# Patient Record
Sex: MALE | Race: WHITE | NOT HISPANIC OR LATINO | Employment: OTHER | URBAN - METROPOLITAN AREA
[De-identification: names, ages, dates, MRNs, and addresses within clinical notes are randomized per-mention and may not be internally consistent; named-entity substitution may affect disease eponyms.]

---

## 2017-05-03 ENCOUNTER — NON-PROVIDER VISIT (OUTPATIENT)
Dept: URGENT CARE | Facility: CLINIC | Age: 47
End: 2017-05-03

## 2017-05-03 DIAGNOSIS — Z02.1 PRE-EMPLOYMENT DRUG SCREENING: ICD-10-CM

## 2017-05-03 LAB
AMP AMPHETAMINE: NORMAL
COC COCAINE: NORMAL
INT CON NEG: NORMAL
INT CON POS: NORMAL
MET METHAMPHETAMINES: NORMAL
OPI OPIATES: NORMAL
PCP PHENCYCLIDINE: NORMAL
POC DRUG COMMENT 753798-OCCUPATIONAL HEALTH: NORMAL
THC: NORMAL

## 2017-05-03 PROCEDURE — 80305 DRUG TEST PRSMV DIR OPT OBS: CPT | Performed by: NURSE PRACTITIONER

## 2017-05-10 ENCOUNTER — HOSPITAL ENCOUNTER (EMERGENCY)
Dept: HOSPITAL 8 - ED | Age: 47
Discharge: HOME | End: 2017-05-10
Payer: SELF-PAY

## 2017-05-10 VITALS — BODY MASS INDEX: 28.95 KG/M2 | HEIGHT: 75 IN | WEIGHT: 232.81 LBS

## 2017-05-10 VITALS — DIASTOLIC BLOOD PRESSURE: 87 MMHG | SYSTOLIC BLOOD PRESSURE: 136 MMHG

## 2017-05-10 DIAGNOSIS — K92.2: Primary | ICD-10-CM

## 2017-05-10 LAB
AST SERPL-CCNC: 25 U/L (ref 15–37)
BUN SERPL-MCNC: 14 MG/DL (ref 7–18)

## 2017-05-10 PROCEDURE — 99285 EMERGENCY DEPT VISIT HI MDM: CPT

## 2017-05-10 PROCEDURE — 81003 URINALYSIS AUTO W/O SCOPE: CPT

## 2017-05-10 PROCEDURE — 83690 ASSAY OF LIPASE: CPT

## 2017-05-10 PROCEDURE — 80053 COMPREHEN METABOLIC PANEL: CPT

## 2017-05-10 PROCEDURE — 85730 THROMBOPLASTIN TIME PARTIAL: CPT

## 2017-05-10 PROCEDURE — 85610 PROTHROMBIN TIME: CPT

## 2017-05-10 PROCEDURE — 36415 COLL VENOUS BLD VENIPUNCTURE: CPT

## 2017-05-10 PROCEDURE — 74177 CT ABD & PELVIS W/CONTRAST: CPT

## 2017-05-10 PROCEDURE — 85025 COMPLETE CBC W/AUTO DIFF WBC: CPT

## 2017-05-10 PROCEDURE — 74020: CPT

## 2017-06-22 ENCOUNTER — OFFICE VISIT (OUTPATIENT)
Dept: MEDICAL GROUP | Facility: MEDICAL CENTER | Age: 47
End: 2017-06-22
Attending: NURSE PRACTITIONER
Payer: MEDICAID

## 2017-06-22 VITALS
HEIGHT: 76 IN | DIASTOLIC BLOOD PRESSURE: 72 MMHG | BODY MASS INDEX: 28.37 KG/M2 | TEMPERATURE: 97.8 F | OXYGEN SATURATION: 100 % | WEIGHT: 233 LBS | SYSTOLIC BLOOD PRESSURE: 104 MMHG | RESPIRATION RATE: 16 BRPM | HEART RATE: 72 BPM

## 2017-06-22 DIAGNOSIS — Z13.21 ENCOUNTER FOR VITAMIN DEFICIENCY SCREENING: ICD-10-CM

## 2017-06-22 DIAGNOSIS — J34.2 DEVIATED NASAL SEPTUM: ICD-10-CM

## 2017-06-22 DIAGNOSIS — Z13.1 SCREENING FOR DIABETES MELLITUS: ICD-10-CM

## 2017-06-22 DIAGNOSIS — F99 PSYCHIATRIC DISORDER: ICD-10-CM

## 2017-06-22 DIAGNOSIS — R06.83 SNORES: ICD-10-CM

## 2017-06-22 DIAGNOSIS — Z76.89 SLEEP CONCERN: ICD-10-CM

## 2017-06-22 DIAGNOSIS — Z13.29 SCREENING FOR THYROID DISORDER: ICD-10-CM

## 2017-06-22 DIAGNOSIS — Z00.00 ROUTINE HEALTH MAINTENANCE: ICD-10-CM

## 2017-06-22 DIAGNOSIS — K21.00 GASTROESOPHAGEAL REFLUX DISEASE WITH ESOPHAGITIS: ICD-10-CM

## 2017-06-22 DIAGNOSIS — Z13.220 SCREENING CHOLESTEROL LEVEL: ICD-10-CM

## 2017-06-22 DIAGNOSIS — Z87.19 HISTORY OF BARRETT'S ESOPHAGUS: ICD-10-CM

## 2017-06-22 DIAGNOSIS — Z12.5 SCREENING PSA (PROSTATE SPECIFIC ANTIGEN): ICD-10-CM

## 2017-06-22 DIAGNOSIS — E34.9 TESTOSTERONE INSUFFICIENCY: ICD-10-CM

## 2017-06-22 PROCEDURE — 99203 OFFICE O/P NEW LOW 30 MIN: CPT | Performed by: NURSE PRACTITIONER

## 2017-06-22 PROCEDURE — 99204 OFFICE O/P NEW MOD 45 MIN: CPT | Performed by: NURSE PRACTITIONER

## 2017-06-22 RX ORDER — TESTOSTERONE 16.2 MG/G
GEL TRANSDERMAL
Refills: 1 | COMMUNITY
Start: 2017-04-15 | End: 2017-08-07 | Stop reason: SDUPTHER

## 2017-06-22 RX ORDER — HYDROXYZINE PAMOATE 50 MG/1
CAPSULE ORAL
Refills: 1 | COMMUNITY
Start: 2017-04-15 | End: 2017-06-22

## 2017-06-22 RX ORDER — BUSPIRONE HYDROCHLORIDE 30 MG/1
TABLET ORAL
Refills: 0 | COMMUNITY
Start: 2017-04-24 | End: 2017-10-05

## 2017-06-22 RX ORDER — OMEPRAZOLE 20 MG/1
20 TABLET, DELAYED RELEASE ORAL DAILY
Qty: 30 TAB | Refills: 2 | Status: SHIPPED | OUTPATIENT
Start: 2017-06-22 | End: 2017-09-18 | Stop reason: SDUPTHER

## 2017-06-22 RX ORDER — TESTOSTERONE 20.25 MG/1.25G
1 GEL TOPICAL DAILY
Qty: 40 G | Refills: 2 | Status: SHIPPED | OUTPATIENT
Start: 2017-06-22 | End: 2017-10-05

## 2017-06-22 RX ORDER — LORAZEPAM 0.5 MG/1
TABLET ORAL
Refills: 0 | COMMUNITY
Start: 2017-05-30 | End: 2017-10-05

## 2017-06-22 RX ORDER — QUETIAPINE FUMARATE 100 MG/1
100 TABLET, FILM COATED ORAL
COMMUNITY
End: 2017-08-07

## 2017-06-22 RX ORDER — TRAZODONE HYDROCHLORIDE 150 MG/1
TABLET ORAL
Refills: 0 | COMMUNITY
Start: 2017-06-02 | End: 2017-08-07

## 2017-06-22 RX ORDER — SERTRALINE HYDROCHLORIDE 100 MG/1
TABLET, FILM COATED ORAL
Refills: 0 | COMMUNITY
Start: 2017-05-30 | End: 2017-08-07

## 2017-06-22 ASSESSMENT — PATIENT HEALTH QUESTIONNAIRE - PHQ9: CLINICAL INTERPRETATION OF PHQ2 SCORE: 1

## 2017-06-22 NOTE — PROGRESS NOTES
Chief Complaint   Patient presents with   • New Patient     low testosterone          HISTORY OF THE PRESENT ILLNESS: Patient is a 46 y.o. male.   aJred presents to the clinic to establish care as New Patient.    His PMH includes    Anxiety,Depression, Bipolar Disorder  Testosterone Use/Low Testosterone  GERD  Hernia Repair    Established with  Psychiatry- Isra Kate ( in [facility])    [place]  Report shows  6/15/17 Lorazepam 0.5 mg # 28 by Isra Kate (Psychiatry)  5/30/17 Lorazepam 0.5 # 14 by Isra Kate  4/15/17 Androgel 1.62 %  75 (30 days) Adán Galindo (Rachel Kline)  More similar Androgel and Lorazepam entries in report  14 Rx and 5 Prescribers    Routine health [facility] info to Pt for vision check.    Psychiatric disorder  Pt reports he has hx of anxiety, depression, and Bipolar Disorder.  He reports he is established at [facility] and sees Psychiatrist Isra Kate.  Is taking Lorazepam, Trazodone, Sertraline, Seroquel and Buspar.      Testosterone insufficiency per patient  Pt reports he has low testosterone history and has been using Androgel.  Pt is currently on Androgel 1.62 daily by pump.  Pt wanting refill.  Pt reports has been on Testosterone for about 5 yrs on and off.  Moved from [facility] 3 months ago.    We will ask for records from Eliud Ramos MD so we can see his past   Levels of Testosterone, as our test will most likely show normal levels as  Pt reports he has been using androgel pump daily but needs refill.      History of Lawrence's esophagus  Pt reports he has hx of Lawrence's esophagus and GERD.  Denies any esophageal bleeding in past.  Requesting Referral to GI MD.  IN past on Omeprazole helped his ongoing  GERD w intermittent  Esophagitis.      Gastroesophageal reflux disease with esophagitis  Pt reports has known GERD.  States has not been on meds for a while.  Reports some mild indigestion.  Hx of  Lawrence's Esophagus per patient.   In past believes was on Omeprazole and it helps.  Denied dark or bloody stools.    We discussed RX for Omeprazole and if insurance denies, will have to trial  H2 blocker or apply for Prior Auth      Deviated nasal septum  Pt reports has had deviated septum with difficulty breathing our of left side   And recurrent nose bleeds, Less in past 6 months as using emollient.  Not had CT scan of nasal area.    Snores  Pt has hx of snoring.  Reports deviated septum  Never had sleep study.  Pt reports was supposed to do sleep study while living in [place] but never completed.      Allergies: Review of patient's allergies indicates not on file.    Current Outpatient Prescriptions Ordered in [facility]   Medication Sig Dispense Refill   • ANDROGEL PUMP 20.25 MG/ACT (1.62%) Gel TAMMI 2 PUMPS AA QD UTD  1   • trazodone (DESYREL) 150 MG Tab TK 2 TS PO QHS  0   • busPIRone (BUSPAR) 30 MG tablet TK 1 T PO BID  0   • lorazepam (ATIVAN) 0.5 MG Tab TK 1 T PO  ONCE A DAY PRN  0   • sertraline (ZOLOFT) 100 MG Tab TK 1 T PO  QAM  0   • quetiapine (SEROQUEL) 100 MG Tab Take 100 mg by mouth every bedtime.     • Testosterone (ANDROGEL) 20.25 MG/1.25GM (1.62%) Gel Apply 1 Spray to skin as directed every day. 40 g 2   • omeprazole (PRILOSEC OTC) 20 MG tablet Take 1 Tab by mouth every day. 30 Tab 2     No current Epic-ordered facility-administered medications on file.       Past Medical History   Diagnosis Date   • Anxiety    • Depression    • Bipolar affective disorder ([facility])    • GERD (gastroesophageal reflux disease)        Past Surgical History   Procedure Laterality Date   • Hernia repair  1982   • Abdominal exploration         Social History   Substance Use Topics   • Smoking status: Never Smoker    • Smokeless tobacco: Never Used   • Alcohol Use: No       No family status information on file.     Family History   Problem Relation Age of Onset   • Arthritis Father    • Heart Disease Maternal Uncle    •  "Psychiatry Paternal Aunt    • Heart Disease Maternal Grandmother    • Lung Disease Maternal Grandfather    • Cancer Maternal Grandfather    • Cancer Paternal Grandfather        Review of Systems   Constitutional: Negative for fever, chills, weight loss and malaise/fatigue.   HENT: Negative for ear pain, nosebleeds, congestion, sore throat and neck pain.    Eyes: Negative for blurred vision.   Respiratory: Negative for cough, sputum production, shortness of breath and wheezing.    Cardiovascular: Negative for chest pain, palpitations, orthopnea and leg swelling. Reports some exertional SOB with   Re-starting exercise this past week.  Gastrointestinal: Negative for nausea, vomiting and abdominal pain. Positive for acid indigestion/GERD/\"salcedo's esophagus\"  Genitourinary: Negative for dysuria, urgency and frequency.   Musculoskeletal: Negative for myalgias, back pain and joint pain.   Skin: Negative for rash and itching.   Neurological: Negative for dizziness, tingling, tremors, sensory change, focal weakness and headaches.   Endo/Heme/Allergies: Does not bruise/bleed easily.   Psychiatric/Behavioral: Positive for depression, anxiety, Bipolar Disorder, Negative for memory loss or suicidal ideation.        Current medications, allergies, and problem list reviewed with patient and updated in  [facility] today.      Exam: Blood pressure 104/72, pulse 72, temperature 36.6 °C (97.8 °F), resp. rate 16, height 1.93 m (6' 4\"), weight 105.688 kg (233 lb), SpO2 100 %.  General: Normal appearing. No distress.  HEENT: Normocephalic. Eyes conjunctiva clear lids without ptosis, pupils equal and reactive to light accommodation, ears normal shape and contour, canals are clear bilaterally, TM's are benign, nasal mucosa benign, oropharynx is without erythema, edema or exudates.   Neck: Supple without JVD. Thyroid is not enlarged.  Pulmonary: Clear to ausculation.  Normal effort. No rales, ronchi, or wheezing.  Cardiovascular: Regular rate " and rhythm. Radial pulses are intact and equal bilaterally.  Abdomen: Soft, nontender, nondistended.   Neurologic: Grossly nonfocal  Lymph: No cervical or supraclavicular lymph nodes are palpable  Skin: Warm and dry.  No obvious lesions.  Musculoskeletal: Normal gait. No extremity cyanosis, clubbing, or edema.  Psych: Normal mood and affect. Alert and oriented x3.     Assessment/Plan  1. Routine health maintenance  CBC WITH DIFFERENTIAL    COMP METABOLIC PANEL   2. Screening for thyroid disorder  TSH   3. Encounter for vitamin deficiency screening  VITAMIN D,25 HYDROXY    VITAMIN B12   4. Screening cholesterol level  LIPID PROFILE   5. Screening PSA (prostate specific antigen)  PROSTATE SPECIFIC AG SCREENING   6. Screening for diabetes mellitus  HEMOGLOBIN A1C   7. Psychiatric disorder  Continue meds per Psychiatry and f/u as specified.   8. Testosterone insufficiency  TESTOSTERONE, FREE AND TOTAL    Testosterone (ANDROGEL) 20.25 MG/1.25GM (1.62%) Gel  Records from Eliud SAMANIEGO MD to be obtained for previous testosterone levels.  If denied may need Prior Auth   9. History of Lawrence's esophagus  REFERRAL TO GASTROENTEROLOGY    omeprazole (PRILOSEC OTC) 20 MG tablet   10. Gastroesophageal reflux disease with esophagitis  REFERRAL TO GASTROENTEROLOGY    omeprazole (PRILOSEC OTC) 20 MG tablet   11. Deviated nasal septum  CT-MAXILLOFACIAL WITH PLUS RECONS    REFERRAL TO ENT   12. Snores  CT-MAXILLOFACIAL WITH PLUS RECONS    REFERRAL TO SLEEP STUDIES    REFERRAL TO ENT   13. Sleep concern  REFERRAL TO SLEEP STUDIES   Follow up in 1 month for review of labs and CT scan. Call or return if questions, concerns, or worsening condition.

## 2017-06-22 NOTE — ASSESSMENT & PLAN NOTE
Pt reports has had deviated septum with difficulty breathing our of left side   And recurrent nose bleeds, Less in past 6 months as using emollient.  Not had CT scan of nasal area.

## 2017-06-22 NOTE — ASSESSMENT & PLAN NOTE
Pt has hx of snoring.  Reports deviated septum  Never had sleep study.  Pt reports was supposed to do sleep study while living in [place] but never completed.

## 2017-06-22 NOTE — MR AVS SNAPSHOT
"        Jared Rg   2017 9:30 AM   Office Visit   MRN: [id number]    Department:  [facility]   Dept Phone:  [phone]    Description:  Male : 1970   Provider:  CHESTER Hansen           Reason for Visit     New Patient low testosterone       Allergies as of 2017     Not on File      You were diagnosed with     Routine health maintenance   [164362]       Screening for thyroid disorder   [V77.0.ICD-9-CM]       Encounter for vitamin deficiency screening   [142239]       Screening cholesterol level   [370379]       Screening PSA (prostate specific antigen)   [687509]       Screening for diabetes mellitus   [V77.1.ICD-9-CM]       Psychiatric disorder   [911383]       Testosterone insufficiency   [703575]       History of Lawrence's esophagus   [5146281]       Gastroesophageal reflux disease with esophagitis   [8519782]       Deviated nasal septum   [470.ICD-9-CM]       Snores   [857608]       Sleep concern   [963316]         Vital Signs     Blood Pressure Pulse Temperature Respirations Height Weight    104/72 mmHg 72 36.6 °C (97.8 °F) 16 1.93 m (6' 4\") 105.688 kg (233 lb)    Body Mass Index Oxygen Saturation Smoking Status             28.37 kg/m2 100% Never Smoker          Basic Information     Date Of Birth Sex Race Ethnicity Preferred Language    1970 Male White Non- English      Your appointments     2017  9:10 AM   [facility]abl[facility] with CHESTER Hansen   [facility]    [address] [place] [id number]   [phone]           You will be receiving a confirmation call a few days before your appointment from our automated call confirmation system.              Problem List              ICD-10-CM Priority Class Noted - Resolved    Routine health maintenance Z00.00   2017 - Present    Psychiatric disorder F99   2017 - Present    Testosterone insufficiency E29.1   2017 - Present    History of Lawrence's " esophagus Z87.19   6/22/2017 - Present    Gastroesophageal reflux disease with esophagitis K21.0   6/22/2017 - Present    Deviated nasal septum J34.2   6/22/2017 - Present    Snores R06.83   6/22/2017 - Present      Health Maintenance        Date Due Completion Dates    IMM DTaP/Tdap/Td Vaccine (1 - Tdap) 9/29/1990 ---            Current Immunizations     No immunizations on file.      Below and/or attached are the medications your provider expects you to take. Review all of your home medications and newly ordered medications with your provider and/or pharmacist. Follow medication instructions as directed by your provider and/or pharmacist. Please keep your medication list with you and share with your provider. Update the information when medications are discontinued, doses are changed, or new medications (including over-the-counter products) are added; and carry medication information at all times in the event of emergency situations     Allergies:  (Not on file)          Medications  Valid as of: June 22, 2017 -  9:57 AM    Generic Name Brand Name Tablet Size Instructions for use    BusPIRone HCl (Tab) BUSPAR 30 MG TK 1 T PO BID        LORazepam (Tab) ATIVAN 0.5 MG TK 1 T PO  ONCE A DAY PRN        Omeprazole Magnesium (Tablet Delayed Response) PRILOSEC OTC 20 MG Take 1 Tab by mouth every day.        QUEtiapine Fumarate (Tab) SEROQUEL 100 MG Take 100 mg by mouth every bedtime.        Sertraline HCl (Tab) ZOLOFT 100 MG TK 1 T PO  QAM        Testosterone (Gel) ANDROGEL PUMP 20.25 MG/ACT (1.62%) TAMMI 2 PUMPS AA QD UTD        Testosterone (Gel) Testosterone 20.25 MG/1.25GM (1.62%) Apply 1 Spray to skin as directed every day.        TraZODone HCl (Tab) DESYREL 150 MG TK 2 TS PO QHS        .                 Medicines prescribed today were sent to:     [id number] - [place] - [address] AT [facility] [address]    [address] [address]    Phone: [phone] Fax:  [phone]    Open 24 Hours?: No      Medication refill instructions:       If your prescription bottle indicates you have medication refills left, it is not necessary to call your provider’s office. Please contact your pharmacy and they will refill your medication.    If your prescription bottle indicates you do not have any refills left, you may request refills at any time through one of the following ways: The online [employer] system (except Urgent Care), by calling your provider’s office, or by asking your pharmacy to contact your provider’s office with a refill request. Medication refills are processed only during regular business hours and may not be available until the next business day. Your provider may request additional information or to have a follow-up visit with you prior to refilling your medication.   *Please Note: Medication refills are assigned a new Rx number when refilled electronically. Your pharmacy may indicate that no refills were authorized even though a new prescription for the same medication is available at the pharmacy. Please request the medicine by name with the pharmacy before contacting your provider for a refill.        Your To Do List     Future Labs/Procedures Complete By Expires    CBC WITH DIFFERENTIAL  As directed 6/22/2018    COMP METABOLIC PANEL  As directed 6/22/2018    CT-MAXILLOFACIAL WITH PLUS RECONS  As directed 6/22/2018    HEMOGLOBIN A1C  As directed 6/22/2018    LIPID PROFILE  As directed 6/22/2018    PROSTATE SPECIFIC AG SCREENING  As directed 6/22/2018    TESTOSTERONE, FREE AND TOTAL  As directed 6/22/2018    TSH  As directed 6/22/2018    VITAMIN B12  As directed 6/22/2018    VITAMIN D,25 HYDROXY  As directed 6/22/2018      Referral     A referral request has been sent to our patient care coordination department. Please allow 3-5 business days for us to process this request and contact you either by phone or mail. If you do not hear from us by the 5th business day,  please call us at [phone].           delicious Access Code: [email or web address]  Expires: 7/21/2017  3:17 PM    delicious  A secure, online tool to manage your health information     [employer]’s delicious® is a secure, online tool that connects you to your personalized health information from the privacy of your home -- day or night - making it very easy for you to manage your healthcare. Once the activation process is completed, you can even access your medical information using the delicious naya, which is available for free in the [employer] Naya store or Google Play store.     delicious provides the following levels of access (as shown below):   My Chart Features   [facility] Primary Care Doctor [facility]  Specialists [facility]  Urgent  Care Non-[facility]  Primary Care  Doctor   Email your healthcare team securely and privately 24/7 X X X    Manage appointments: schedule your next appointment; view details of past/upcoming appointments X      Request prescription refills. X      View recent personal medical records, including lab and immunizations X X X X   View health record, including health history, allergies, medications X X X X   Read reports about your outpatient visits, procedures, consult and ER notes X X X X   See your discharge summary, which is a recap of your hospital and/or ER visit that includes your diagnosis, lab results, and care plan. X X       How to register for delicious:  1. Go to  [email or web address].  2. Click on the Sign Up Now box, which takes you to the New Member Sign Up page. You will need to provide the following information:  a. Enter your delicious Access Code exactly as it appears at the top of this page. (You will not need to use this code after you’ve completed the sign-up process. If you do not sign up before the expiration date, you must request a new code.)   b. Enter your date of birth.   c. Enter your home email address.   d. Click Submit, and follow the next screen’s  instructions.  3. Create a BCNXt ID. This will be your [employer] login ID and cannot be changed, so think of one that is secure and easy to remember.  4. Create a [employer] password. You can change your password at any time.  5. Enter your Password Reset Question and Answer. This can be used at a later time if you forget your password.   6. Enter your e-mail address. This allows you to receive e-mail notifications when new information is available in [facility].  7. Click Sign Up. You can now view your health information.    For assistance activating your [facility] [employer] account, call [phone]

## 2017-06-22 NOTE — ASSESSMENT & PLAN NOTE
Pt reports he has hx of anxiety, depression, and Bipolar Disorder.  He reports he is established at [facility] and sees Psychiatrist Isra Kate.  Is taking Lorazepam, Trazodone, Sertraline, Seroquel and Buspar.

## 2017-06-22 NOTE — ASSESSMENT & PLAN NOTE
Pt reports he has low testosterone history and has been using Androgel.  Pt is currently on Androgel 1.62 daily by pump.  Pt wanting refill.  Pt reports has been on Testosterone for about 5 yrs on and off.  Moved from [facility] 3 months ago.    We will ask for records from Eliud Ramos MD so we can see his past   Levels of Testosterone, as our test will most likely show normal levels as  Pt reports he has been using androgel pump daily but needs refill.

## 2017-06-22 NOTE — ASSESSMENT & PLAN NOTE
Pt reports has known GERD.  States has not been on meds for a while.  Reports some mild indigestion.  Hx of Lawrence's Esophagus per patient.   In past believes was on Omeprazole and it helps.  Denied dark or bloody stools.    We discussed RX for Omeprazole and if insurance denies, will have to trial  H2 blocker or apply for Prior Auth

## 2017-06-26 ENCOUNTER — HOSPITAL ENCOUNTER (EMERGENCY)
Dept: HOSPITAL 8 - ED | Age: 47
Discharge: HOME | End: 2017-06-26
Payer: MEDICARE

## 2017-06-26 VITALS — HEIGHT: 75 IN | BODY MASS INDEX: 29.69 KG/M2 | WEIGHT: 238.76 LBS

## 2017-06-26 VITALS — DIASTOLIC BLOOD PRESSURE: 79 MMHG | SYSTOLIC BLOOD PRESSURE: 124 MMHG

## 2017-06-26 DIAGNOSIS — R07.89: Primary | ICD-10-CM

## 2017-06-26 DIAGNOSIS — Z88.0: ICD-10-CM

## 2017-06-26 LAB
AST SERPL-CCNC: 29 U/L (ref 15–37)
BUN SERPL-MCNC: 15 MG/DL (ref 7–18)
IS PT STATUS REG ER OR PRE ER?: YES

## 2017-06-26 PROCEDURE — 36415 COLL VENOUS BLD VENIPUNCTURE: CPT

## 2017-06-26 PROCEDURE — 93005 ELECTROCARDIOGRAM TRACING: CPT

## 2017-06-26 PROCEDURE — 80053 COMPREHEN METABOLIC PANEL: CPT

## 2017-06-26 PROCEDURE — 99285 EMERGENCY DEPT VISIT HI MDM: CPT

## 2017-06-26 PROCEDURE — 85025 COMPLETE CBC W/AUTO DIFF WBC: CPT

## 2017-06-26 PROCEDURE — 83880 ASSAY OF NATRIURETIC PEPTIDE: CPT

## 2017-06-26 PROCEDURE — 71010: CPT

## 2017-06-26 PROCEDURE — 84484 ASSAY OF TROPONIN QUANT: CPT

## 2017-06-26 RX ORDER — TRETINOIN 1 MG/G
CREAM TOPICAL NIGHTLY
COMMUNITY
End: 2017-08-07 | Stop reason: SDUPTHER

## 2017-06-30 ENCOUNTER — NON-PROVIDER VISIT (OUTPATIENT)
Dept: URGENT CARE | Facility: CLINIC | Age: 47
End: 2017-06-30

## 2017-06-30 DIAGNOSIS — Z02.1 PRE-EMPLOYMENT DRUG SCREENING: ICD-10-CM

## 2017-06-30 LAB
AMP AMPHETAMINE: NORMAL
COC COCAINE: NORMAL
INT CON NEG: NORMAL
INT CON POS: NORMAL
MET METHAMPHETAMINES: NORMAL
OPI OPIATES: NORMAL
PCP PHENCYCLIDINE: NORMAL
POC DRUG COMMENT 753798-OCCUPATIONAL HEALTH: NEGATIVE
THC: NORMAL

## 2017-06-30 PROCEDURE — 80305 DRUG TEST PRSMV DIR OPT OBS: CPT | Performed by: PHYSICIAN ASSISTANT

## 2017-07-11 ENCOUNTER — TELEPHONE (OUTPATIENT)
Dept: MEDICAL GROUP | Facility: MEDICAL CENTER | Age: 47
End: 2017-07-11

## 2017-07-11 NOTE — TELEPHONE ENCOUNTER
FINAL PRIOR AUTHORIZATION STATUS:    1.  Name of Medication & Dose: AndroGel 1.62%     2. Prior Auth Status: Approved through 12/31/2017  APPROVAL # [id number], to be filled on 8/6/17    3. Action Taken: Pharmacy Notified: yes Patient Notified: yes

## 2017-07-11 NOTE — TELEPHONE ENCOUNTER
DOCUMENTATION OF PAR STATUS:    1. Name of Medication & Dose: Shashank Gel 1.62%     2. Name of Prescription Coverage Company & phone #: [id number]    3. Date Prior Auth Submitted: 7/11/17     4. What information was given to obtain insurance decision? Office note    5. Prior Auth Status? Pending    6. Patient Notified: yes

## 2017-07-11 NOTE — TELEPHONE ENCOUNTER
Patient has Medicare and [employer] for medication. Called [phone] and received an approval for Shashank Gel over the phone

## 2017-07-18 ENCOUNTER — HOSPITAL ENCOUNTER (OUTPATIENT)
Dept: RADIOLOGY | Facility: MEDICAL CENTER | Age: 47
End: 2017-07-18
Attending: NURSE PRACTITIONER
Payer: MEDICARE

## 2017-07-18 DIAGNOSIS — J34.2 DEVIATED NASAL SEPTUM: ICD-10-CM

## 2017-07-18 DIAGNOSIS — R06.83 SNORES: ICD-10-CM

## 2017-07-18 PROCEDURE — 70486 CT MAXILLOFACIAL W/O DYE: CPT

## 2017-07-26 ENCOUNTER — NON-PROVIDER VISIT (OUTPATIENT)
Dept: OCCUPATIONAL MEDICINE | Facility: CLINIC | Age: 47
End: 2017-07-26

## 2017-07-26 DIAGNOSIS — Z02.1 PRE-EMPLOYMENT DRUG SCREENING: ICD-10-CM

## 2017-07-26 PROCEDURE — 8907 PR URINE COLLECT ONLY: Performed by: PREVENTIVE MEDICINE

## 2017-07-26 NOTE — MR AVS SNAPSHOT
Jared Bolivar Ifrah   2017 4:50 PM   Non-Provider Visit   MRN: [id number]    Department:  [facility]   Dept Phone:  [phone]    Description:  Male : 1970   Provider:  [facility] REINALDO SHEETS RJENNA           Reason for Visit     Other Preemployment d/s      Allergies as of 2017     Not on File      You were diagnosed with     Pre-employment drug screening   [159961]         Vital Signs     Smoking Status                   Never Smoker            Basic Information     Date Of Birth Sex Race Ethnicity Preferred Language    1970 Male White Non- English      Your appointments     2017  4:50 PM   Occupational Health Drug and Alcohol Testing with [place] REINALDO MORALES MA   [facility] [employer] [address]  [facility] [address]   [phone]            2017  9:20 AM   Provider Consultation with Humble Karimi M.D.   [facility] [employer] [address]  [facility] [address] [phone]            2017  9:00 AM   Adult Draw/Collection with LAB YAAKOV   [facility] - [facility] (--)    [address]  [facility] [address]   [phone]            Aug 07, 2017  8:30 AM   Established Patient with CHESTER Hansen   [facility]    [address] [facility] [id number] [phone]           You will be receiving a confirmation call a few days before your appointment from our automated call confirmation system.              Problem List              ICD-10-CM Priority Class Noted - Resolved    Routine health maintenance Z00.00   2017 - Present    Psychiatric disorder F99   2017 - Present    Testosterone insufficiency E29.1   2017 - Present    History of Lawrence's esophagus Z87.19   2017 - Present    Gastroesophageal reflux disease with esophagitis K21.0   2017 - Present    Deviated nasal septum J34.2   2017 - Present    Snores R06.83    6/22/2017 - Present      Health Maintenance        Date Due Completion Dates    IMM DTaP/Tdap/Td Vaccine (1 - Tdap) 9/29/1990 ---    IMM INFLUENZA (1) 9/1/2017 ---            Current Immunizations     No immunizations on file.      Below and/or attached are the medications your provider expects you to take. Review all of your home medications and newly ordered medications with your provider and/or pharmacist. Follow medication instructions as directed by your provider and/or pharmacist. Please keep your medication list with you and share with your provider. Update the information when medications are discontinued, doses are changed, or new medications (including over-the-counter products) are added; and carry medication information at all times in the event of emergency situations     Allergies:  No Known Allergies          Medications  Valid as of: July 26, 2017 -  3:21 PM    Generic Name Brand Name Tablet Size Instructions for use    BusPIRone HCl (Tab) BUSPAR 30 MG TK 1 T PO BID        LORazepam (Tab) ATIVAN 0.5 MG TK 1 T PO  ONCE A DAY PRN        Omeprazole Magnesium (Tablet Delayed Response) PRILOSEC OTC 20 MG Take 1 Tab by mouth every day.        QUEtiapine Fumarate (Tab) SEROQUEL 100 MG Take 100 mg by mouth every bedtime.        Sertraline HCl (Tab) ZOLOFT 100 MG TK 1 T PO  QAM        Testosterone (Gel) ANDROGEL PUMP 20.25 MG/ACT (1.62%) TAMMI 2 PUMPS AA QD UTD        Testosterone (Gel) Testosterone 20.25 MG/1.25GM (1.62%) Apply 1 Spray to skin as directed every day.        TraZODone HCl (Tab) DESYREL 150 MG TK 2 TS PO QHS        Tretinoin (Cream) RETIN-A 0.1 % Apply  to affected area(s) every evening.        .                 Medicines prescribed today were sent to:     [id number] - [place] - [address] AT [facility] [address]    [address] [address]    Phone: [phone] Fax: [phone]    Open 24 Hours?: No      Medication refill instructions:       If  your prescription bottle indicates you have medication refills left, it is not necessary to call your provider’s office. Please contact your pharmacy and they will refill your medication.    If your prescription bottle indicates you do not have any refills left, you may request refills at any time through one of the following ways: The online Job36 system (except Urgent Care), by calling your provider’s office, or by asking your pharmacy to contact your provider’s office with a refill request. Medication refills are processed only during regular business hours and may not be available until the next business day. Your provider may request additional information or to have a follow-up visit with you prior to refilling your medication.   *Please Note: Medication refills are assigned a new Rx number when refilled electronically. Your pharmacy may indicate that no refills were authorized even though a new prescription for the same medication is available at the pharmacy. Please request the medicine by name with the pharmacy before contacting your provider for a refill.        Other Notes About Your Plan     7/3/17 Dr Crook consult appt. See notes.           Job36 Access Code: [email or web address]  Expires: 8/24/2017 12:13 PM    Job36  A secure, online tool to manage your health information     [employer]’s Job36® is a secure, online tool that connects you to your personalized health information from the privacy of your home -- day or night - making it very easy for you to manage your healthcare. Once the activation process is completed, you can even access your medical information using the Job36 naya, which is available for free in the Apple Naya store or Google Play store.     Job36 provides the following levels of access (as shown below):   My Chart Features   [facility] Primary [facility] Doctor [facility]  Specialists [facility]  Urgent  Care Non-[facility]  Primary Care  Doctor   Email your healthcare team securely and  privately 24/7 X X X    Manage appointments: schedule your next appointment; view details of past/upcoming appointments X      Request prescription refills. X      View recent personal medical records, including lab and immunizations X X X X   View health record, including health history, allergies, medications X X X X   Read reports about your outpatient visits, procedures, consult and ER notes X X X X   See your discharge summary, which is a recap of your hospital and/or ER visit that includes your diagnosis, lab results, and care plan. X X       How to register for Star.me:  1. Go to  [email or web address].  2. Click on the Sign Up Now box, which takes you to the New Member Sign Up page. You will need to provide the following information:  a. Enter your Star.me Access Code exactly as it appears at the top of this page. (You will not need to use this code after you’ve completed the sign-up process. If you do not sign up before the expiration date, you must request a new code.)   b. Enter your date of birth.   c. Enter your home email address.   d. Click Submit, and follow the next screen’s instructions.  3. Create a Star.me ID. This will be your Star.me login ID and cannot be changed, so think of one that is secure and easy to remember.  4. Create a Star.me password. You can change your password at any time.  5. Enter your Password Reset Question and Answer. This can be used at a later time if you forget your password.   6. Enter your e-mail address. This allows you to receive e-mail notifications when new information is available in Star.me.  7. Click Sign Up. You can now view your health information.    For assistance activating your Star.me account, call [phone]

## 2017-08-04 ENCOUNTER — HOSPITAL ENCOUNTER (OUTPATIENT)
Dept: LAB | Facility: MEDICAL CENTER | Age: 47
End: 2017-08-04
Attending: NURSE PRACTITIONER
Payer: MEDICARE

## 2017-08-04 DIAGNOSIS — Z13.29 SCREENING FOR THYROID DISORDER: ICD-10-CM

## 2017-08-04 DIAGNOSIS — Z00.00 ROUTINE HEALTH MAINTENANCE: ICD-10-CM

## 2017-08-04 DIAGNOSIS — E34.9 TESTOSTERONE INSUFFICIENCY: ICD-10-CM

## 2017-08-04 DIAGNOSIS — Z13.220 SCREENING CHOLESTEROL LEVEL: ICD-10-CM

## 2017-08-04 DIAGNOSIS — Z13.1 SCREENING FOR DIABETES MELLITUS: ICD-10-CM

## 2017-08-04 DIAGNOSIS — Z12.5 SCREENING PSA (PROSTATE SPECIFIC ANTIGEN): ICD-10-CM

## 2017-08-04 DIAGNOSIS — Z13.21 ENCOUNTER FOR VITAMIN DEFICIENCY SCREENING: ICD-10-CM

## 2017-08-04 LAB
25(OH)D3 SERPL-MCNC: 33 NG/ML (ref 30–100)
ALBUMIN SERPL BCP-MCNC: 4.2 G/DL (ref 3.2–4.9)
ALBUMIN/GLOB SERPL: 1.3 G/DL
ALP SERPL-CCNC: 62 U/L (ref 30–99)
ALT SERPL-CCNC: 33 U/L (ref 2–50)
ANION GAP SERPL CALC-SCNC: 8 MMOL/L (ref 0–11.9)
AST SERPL-CCNC: 23 U/L (ref 12–45)
BASOPHILS # BLD AUTO: 0.9 % (ref 0–1.8)
BASOPHILS # BLD: 0.06 K/UL (ref 0–0.12)
BILIRUB SERPL-MCNC: 0.5 MG/DL (ref 0.1–1.5)
BUN SERPL-MCNC: 12 MG/DL (ref 8–22)
CALCIUM SERPL-MCNC: 9.5 MG/DL (ref 8.5–10.5)
CHLORIDE SERPL-SCNC: 106 MMOL/L (ref 96–112)
CHOLEST SERPL-MCNC: 214 MG/DL (ref 100–199)
CO2 SERPL-SCNC: 24 MMOL/L (ref 20–33)
CREAT SERPL-MCNC: 0.92 MG/DL (ref 0.5–1.4)
EOSINOPHIL # BLD AUTO: 0.34 K/UL (ref 0–0.51)
EOSINOPHIL NFR BLD: 4.9 % (ref 0–6.9)
ERYTHROCYTE [DISTWIDTH] IN BLOOD BY AUTOMATED COUNT: 42.9 FL (ref 35.9–50)
EST. AVERAGE GLUCOSE BLD GHB EST-MCNC: 105 MG/DL
GFR SERPL CREATININE-BSD FRML MDRD: >60 ML/MIN/1.73 M 2
GLOBULIN SER CALC-MCNC: 3.2 G/DL (ref 1.9–3.5)
GLUCOSE SERPL-MCNC: 90 MG/DL (ref 65–99)
HBA1C MFR BLD: 5.3 % (ref 0–5.6)
HCT VFR BLD AUTO: 52.4 % (ref 42–52)
HDLC SERPL-MCNC: 50 MG/DL
HGB BLD-MCNC: 16.9 G/DL (ref 14–18)
IMM GRANULOCYTES # BLD AUTO: 0.02 K/UL (ref 0–0.11)
IMM GRANULOCYTES NFR BLD AUTO: 0.3 % (ref 0–0.9)
LDLC SERPL CALC-MCNC: 142 MG/DL
LYMPHOCYTES # BLD AUTO: 3.11 K/UL (ref 1–4.8)
LYMPHOCYTES NFR BLD: 44.7 % (ref 22–41)
MCH RBC QN AUTO: 27.7 PG (ref 27–33)
MCHC RBC AUTO-ENTMCNC: 32.3 G/DL (ref 33.7–35.3)
MCV RBC AUTO: 85.8 FL (ref 81.4–97.8)
MONOCYTES # BLD AUTO: 0.46 K/UL (ref 0–0.85)
MONOCYTES NFR BLD AUTO: 6.6 % (ref 0–13.4)
NEUTROPHILS # BLD AUTO: 2.97 K/UL (ref 1.82–7.42)
NEUTROPHILS NFR BLD: 42.6 % (ref 44–72)
NRBC # BLD AUTO: 0 K/UL
NRBC BLD AUTO-RTO: 0 /100 WBC
PLATELET # BLD AUTO: 264 K/UL (ref 164–446)
PMV BLD AUTO: 9.3 FL (ref 9–12.9)
POTASSIUM SERPL-SCNC: 3.9 MMOL/L (ref 3.6–5.5)
PROT SERPL-MCNC: 7.4 G/DL (ref 6–8.2)
PSA SERPL-MCNC: 0.49 NG/ML (ref 0–4)
RBC # BLD AUTO: 6.11 M/UL (ref 4.7–6.1)
SODIUM SERPL-SCNC: 138 MMOL/L (ref 135–145)
TRIGL SERPL-MCNC: 109 MG/DL (ref 0–149)
TSH SERPL DL<=0.005 MIU/L-ACNC: 0.47 UIU/ML (ref 0.3–3.7)
VIT B12 SERPL-MCNC: 916 PG/ML (ref 211–911)
WBC # BLD AUTO: 7 K/UL (ref 4.8–10.8)

## 2017-08-04 PROCEDURE — 84403 ASSAY OF TOTAL TESTOSTERONE: CPT

## 2017-08-04 PROCEDURE — 80053 COMPREHEN METABOLIC PANEL: CPT

## 2017-08-04 PROCEDURE — 84402 ASSAY OF FREE TESTOSTERONE: CPT

## 2017-08-04 PROCEDURE — 82306 VITAMIN D 25 HYDROXY: CPT | Mod: GA

## 2017-08-04 PROCEDURE — 36415 COLL VENOUS BLD VENIPUNCTURE: CPT

## 2017-08-04 PROCEDURE — 83036 HEMOGLOBIN GLYCOSYLATED A1C: CPT | Mod: GA

## 2017-08-04 PROCEDURE — 84270 ASSAY OF SEX HORMONE GLOBUL: CPT

## 2017-08-04 PROCEDURE — 84153 ASSAY OF PSA TOTAL: CPT | Mod: GA

## 2017-08-04 PROCEDURE — 82607 VITAMIN B-12: CPT

## 2017-08-04 PROCEDURE — 80061 LIPID PANEL: CPT | Mod: GA

## 2017-08-04 PROCEDURE — 85025 COMPLETE CBC W/AUTO DIFF WBC: CPT

## 2017-08-04 PROCEDURE — 84443 ASSAY THYROID STIM HORMONE: CPT

## 2017-08-05 LAB
SHBG SERPL-SCNC: 23 NMOL/L (ref 11–80)
TESTOST FREE MFR SERPL: 2.2 % (ref 1.6–2.9)
TESTOST FREE SERPL-MCNC: 94 PG/ML (ref 47–244)
TESTOST SERPL-MCNC: 426 NG/DL (ref 300–890)

## 2017-08-07 ENCOUNTER — OFFICE VISIT (OUTPATIENT)
Dept: MEDICAL GROUP | Facility: MEDICAL CENTER | Age: 47
End: 2017-08-07
Attending: NURSE PRACTITIONER
Payer: MEDICARE

## 2017-08-07 VITALS
DIASTOLIC BLOOD PRESSURE: 70 MMHG | BODY MASS INDEX: 28.62 KG/M2 | WEIGHT: 235 LBS | RESPIRATION RATE: 16 BRPM | HEIGHT: 76 IN | HEART RATE: 84 BPM | OXYGEN SATURATION: 97 % | SYSTOLIC BLOOD PRESSURE: 102 MMHG | TEMPERATURE: 97.7 F

## 2017-08-07 DIAGNOSIS — E78.5 HYPERLIPIDEMIA, MILD: ICD-10-CM

## 2017-08-07 DIAGNOSIS — F99 PSYCHIATRIC DISORDER: ICD-10-CM

## 2017-08-07 DIAGNOSIS — J34.2 DEVIATED NASAL SEPTUM: ICD-10-CM

## 2017-08-07 DIAGNOSIS — L70.0 ACNE VULGARIS: ICD-10-CM

## 2017-08-07 DIAGNOSIS — K21.00 GASTROESOPHAGEAL REFLUX DISEASE WITH ESOPHAGITIS: ICD-10-CM

## 2017-08-07 DIAGNOSIS — R06.83 SNORES: ICD-10-CM

## 2017-08-07 DIAGNOSIS — E34.9 TESTOSTERONE INSUFFICIENCY: ICD-10-CM

## 2017-08-07 PROCEDURE — 99213 OFFICE O/P EST LOW 20 MIN: CPT | Performed by: NURSE PRACTITIONER

## 2017-08-07 PROCEDURE — 99214 OFFICE O/P EST MOD 30 MIN: CPT | Performed by: NURSE PRACTITIONER

## 2017-08-07 RX ORDER — TRETINOIN 1 MG/G
CREAM TOPICAL
Qty: 45 G | Refills: 0 | Status: SHIPPED | OUTPATIENT
Start: 2017-08-07 | End: 2018-08-21 | Stop reason: SDUPTHER

## 2017-08-07 RX ORDER — FLUTICASONE PROPIONATE 50 MCG
2 SPRAY, SUSPENSION (ML) NASAL DAILY
Refills: 0 | COMMUNITY
Start: 2017-07-03

## 2017-08-07 RX ORDER — FLUOXETINE 10 MG/1
CAPSULE ORAL
Refills: 0 | COMMUNITY
Start: 2017-07-31 | End: 2018-06-05

## 2017-08-07 RX ORDER — TESTOSTERONE 16.2 MG/G
2 GEL TRANSDERMAL DAILY
Qty: 10 G | Refills: 1 | Status: SHIPPED | OUTPATIENT
Start: 2017-08-07 | End: 2017-10-05 | Stop reason: SDUPTHER

## 2017-08-07 RX ORDER — ATORVASTATIN CALCIUM 10 MG/1
10 TABLET, FILM COATED ORAL DAILY
Qty: 30 TAB | Refills: 5 | Status: SHIPPED | OUTPATIENT
Start: 2017-08-07 | End: 2018-01-11 | Stop reason: SDUPTHER

## 2017-08-07 RX ORDER — OLANZAPINE 5 MG/1
TABLET ORAL
Refills: 0 | COMMUNITY
Start: 2017-07-31 | End: 2018-02-22

## 2017-08-07 RX ORDER — CEFDINIR 300 MG/1
CAPSULE ORAL
Refills: 0 | COMMUNITY
Start: 2017-07-03 | End: 2017-08-07

## 2017-08-07 ASSESSMENT — PAIN SCALES - GENERAL: PAINLEVEL: NO PAIN

## 2017-08-07 NOTE — ASSESSMENT & PLAN NOTE
"States saw ENT and recommended he have tonsils out and \"excess tissue in throat\".  Is awaiting Sleep Study and then f/u with ENT.  Reports poor sleep but believes related to Psychiatry taking him off   Trazodone and Seroquel.   "

## 2017-08-07 NOTE — ASSESSMENT & PLAN NOTE
"Pt reports has not made appt with GI as has not heard from them  Stomach feels good while taking Omeprazole.  \"i have to watch what I eat\".  Denies dark or bloody stools.  Hs of Barrettes Esophagus.  "

## 2017-08-07 NOTE — PROGRESS NOTES
Chief Complaint: Results, New hyperlipidemia    HPI:  Jared presents to clinic for Results and f/u from New Patient visit on 6/22/17.    His PMH includes    Acne  Anxiety,Depression, Bipolar Disorder  Testosterone Use/Low Testosterone  GERD  Lawrence's Esophagus hx  Hernia Repair  Hyperlipidemia, mild ( new)  Nasal Septum Deviation with Mucosa thickening ( new)    Established with  Psychiatry- Isra Kate ( in Dr Champagne office)    Referrals Approved:  [facility] for Sleep Study  Dr Samayoa for deviated nasal septum ( seen 7/3/17)  GI consultants myla Martinez, Hx of Lawrence's Esophagus      REview of REcords:  7/18/17 Referred to ENT-DR Crook based on CT MAx/Facial  6/22/17 New Patient visit here at Clinic. Labs ordered. Referred to Sleep Study, Referred to GI    [place]  Report shows  7/16/17 Lorazepam 0.5 mg # 30 by Isra Kate (Psychiatry)  6/22/17 Androgel 1.62 Gel Pump 75 by me  6/15/17 Lorazepam 0.5 mg # 28 by Isra Kate (Psychiatry)  5/30/17 Lorazepam 0.5 # 14 by Isra Kate  4/15/17 Androgel 1.62 %  75 (30 days) Adán Galindo ([place])  More similar Androgel and Lorazepam entries in report  14 Rx and 5 Prescribers    Results REview:   7/18/17 CT MAx/Fac Report  1.  There is marked bidirectional nasal septal deviation as described above. There is left-sided nasal septal spur.  2.  Significant mucosal thickening in the nasal cavity causes narrowing of the nasal airway.  3.  Mild peripheral mucosal thickening is noted in the maxillary sinuses.  4.  Mild mucosal thickening is also noted in the ethmoid air cells and right frontal sinus.    8/4/17 Lab Results, CBC normal, CMP normal, PSA= 0.49, Vit D= 33, TSH= 0.470                                  Vit B12= 916,                                   Cholesterol Total= 214 ( hi), Triglycer= 109, HDL= 50, LDL= 142    Hyperlipidemia, mild  We reviewed his recent labs including Lipid panel showing mildly elevated total cholesterol  And  "LDL= 142 ( elevated).  We discussed his diet and reducing saturated fats and sugar and starting him on low dose  Lipitor and Pt agrees.    Acne vulgaris  Pt reports on and off acne issues.  In past on Retin A and asking for refill.  Will fill once, but recommend referral to Derm.    Gastroesophageal reflux disease with esophagitis  Pt reports has not made appt with GI as has not heard from them  Stomach feels good while taking Omeprazole.  \"i have to watch what I eat\".  Denies dark or bloody stools.  Hs of Barrettes Esophagus.    Deviated nasal septum  Pt reports has seen ENT MD r/t deviated septum  Reportedly has excess tissue in \"throat\" and recommends Tonsillectomy  IS going to have Sleep Study first and will f/u ENT.    Psychiatric disorder  Pt has had difficulty w Sleep after stopping Trazodone and Seroquel and taking Lorazepam at night and Zyprexa.   States sleep interupted.  Recommended he call Psychiatry.     Snores  States saw ENT and recommended he have tonsils out and \"excess tissue in throat\".  Is awaiting Sleep Study and then f/u with ENT.  Reports poor sleep but believes related to Psychiatry taking him off   Trazodone and Seroquel.       Patient Active Problem List    Diagnosis Date Noted   • Hyperlipidemia, mild 08/07/2017   • Acne vulgaris 08/07/2017   • Routine health maintenance 06/22/2017   • Psychiatric disorder 06/22/2017   • Testosterone insufficiency 06/22/2017   • History of Lawrence's esophagus 06/22/2017   • Gastroesophageal reflux disease with esophagitis 06/22/2017   • Deviated nasal septum 06/22/2017   • Snores 06/22/2017       Allergies:Review of patient's allergies indicates no known allergies.    Current Outpatient Prescriptions   Medication Sig Dispense Refill   • fluoxetine (PROZAC) 10 MG Cap TK ONE C PO  QHS  0   • olanzapine (ZYPREXA) 5 MG Tab TK 1 T PO QD  0   • fluticasone (FLONASE) 50 MCG/ACT nasal spray SPRAYS 2 SPRAYS IN EACH NOSTRIL QD  0   • atorvastatin (LIPITOR) 10 MG Tab " "Take 1 Tab by mouth every day. 30 Tab 5   • tretinoin (RETIN-A) 0.1 % cream Apply very small amt to acne at night 45 g 0   • ANDROGEL PUMP 20.25 MG/ACT (1.62%) Gel Apply 2 Sprays to affected area(s) every day. Each arm 10 g 1   • busPIRone (BUSPAR) 30 MG tablet TK 1 T PO BID  0   • lorazepam (ATIVAN) 0.5 MG Tab TK 1 T PO  ONCE A DAY PRN  0   • Testosterone (ANDROGEL) 20.25 MG/1.25GM (1.62%) Gel Apply 1 Spray to skin as directed every day. 40 g 2   • omeprazole (PRILOSEC OTC) 20 MG tablet Take 1 Tab by mouth every day. 30 Tab 2     No current facility-administered medications for this visit.       Social History   Substance Use Topics   • Smoking status: Never Smoker    • Smokeless tobacco: Never Used   • Alcohol Use: No       Family History   Problem Relation Age of Onset   • Arthritis Father    • Heart Disease Maternal Uncle    • Psychiatry Paternal Aunt    • Heart Disease Maternal Grandmother    • Lung Disease Maternal Grandfather    • Cancer Maternal Grandfather    • Cancer Paternal Grandfather        ROS:  Review of Systems   See HPI Above        Exam:  Blood pressure 102/70, pulse 84, temperature 36.5 °C (97.7 °F), resp. rate 16, height 1.93 m (6' 3.98\"), weight 106.595 kg (235 lb), SpO2 97 %.  General:  Well nourished, well developed male in NAD  HENT:Head is grossly normal. PERRL.  Neck: Supple. Trachea is midline.  Pulmonary: Clear to ausculation .  Normal effort. No rales, ronchi, or wheezing.   Cardiovascular: Regular rate and rhythm.  Abdomen-Abdomen is soft, No tenderness.  Upper extremities- Good ROM  Lower extremities- neg for edema, redness, tenderness.  Neuro- A & O x 4. Speech clear and appropriate.     Current medications, allergies, and problem list reviewed with patient and updated in  [facility] today.    Assessment/Plan:  1. Hyperlipidemia, mild  atorvastatin (LIPITOR) 10 MG Tab (Start)  Pt to reduce sugar/saturated fats.    2. Acne vulgaris  tretinoin (RETIN-A) 0.1 % cream-1x RX , Pt to see Derm in " future.    REFERRAL TO DERMATOLOGY   3. Gastroesophageal reflux disease with esophagitis  Continue Omeprazole, Pt to call [facility] Consultants for appt r/t GERD and hx of Lawrence's Esophagus.   4. Testosterone insufficiency  ANDROGEL PUMP 20.25 MG/ACT (1.62%) Gel   5. Deviated nasal septum  F/u ENT-DR Crook   6. Psychiatric disorder  F/u Isra Kate and discuss poor sleep.  Continue PSYCH MEDS per Psychiatry.   7. Snores  Pt to  See [facility] today for consult and then schedule sleep study.   Follow up in 6 weeks. Call or return if questions, concerns, or worsening condition.

## 2017-08-07 NOTE — ASSESSMENT & PLAN NOTE
We reviewed his recent labs including Lipid panel showing mildly elevated total cholesterol  And LDL= 142 ( elevated).  We discussed his diet and reducing saturated fats and sugar and starting him on low dose  Lipitor and Pt agrees.

## 2017-08-07 NOTE — ASSESSMENT & PLAN NOTE
Pt reports on and off acne issues.  In past on Retin A and asking for refill.  Will fill once, but recommend referral to Derm.

## 2017-08-07 NOTE — MR AVS SNAPSHOT
"        Jared Lutz Ifrah   2017 8:30 AM   Office Visit   MRN: [id number]    Department[facility]  [facility]   Dept Phone:  [phone]    Description:  Male : 1970   Provider:  CHESTER Hansen           Reason for Visit     Results           Allergies as of 2017     No Known Allergies      You were diagnosed with     Hyperlipidemia, mild   [714977]       Acne vulgaris   [142500]       Gastroesophageal reflux disease with esophagitis   [1423298]       Testosterone insufficiency   [720883]       Deviated nasal septum   [470.ICD-9-CM]       Psychiatric disorder   [529342]         Vital Signs     Blood Pressure Pulse Temperature Respirations Height Weight    102/70 mmHg 84 36.5 °C (97.7 °F) 16 1.93 m (6' 3.98\") 106.595 kg (235 lb)    Body Mass Index Oxygen Saturation Smoking Status             28.62 kg/m2 97% Never Smoker          Basic Information     Date Of Birth Sex Race Ethnicity Preferred Language    1970 Male White Non- English      Your appointments     Sep 18, 2017  3:30 PM   Established Patient with CHESTER Hansen   [facility]    [address] [place] [address]   [phone]           You will be receiving a confirmation call a few days before your appointment from our automated call confirmation system.              Problem List              ICD-10-CM Priority Class Noted - Resolved    Routine health maintenance Z00.00   2017 - Present    Psychiatric disorder F99   2017 - Present    Testosterone insufficiency E29.1   2017 - Present    History of Lawrence's esophagus Z87.19   2017 - Present    Gastroesophageal reflux disease with esophagitis K21.0   2017 - Present    Deviated nasal septum J34.2   2017 - Present    Snores R06.83   2017 - Present    Hyperlipidemia, mild E78.5   2017 - Present    Acne vulgaris L70.0   2017 - Present      Health Maintenance        Date Due Completion Dates   " IMM DTaP/Tdap/Td Vaccine (1 - Tdap) 1/1/2020 (Originally 9/29/1990) ---    IMM INFLUENZA (1) 9/1/2017 ---            Current Immunizations     No immunizations on file.      Below and/or attached are the medications your provider expects you to take. Review all of your home medications and newly ordered medications with your provider and/or pharmacist. Follow medication instructions as directed by your provider and/or pharmacist. Please keep your medication list with you and share with your provider. Update the information when medications are discontinued, doses are changed, or new medications (including over-the-counter products) are added; and carry medication information at all times in the event of emergency situations     Allergies:  No Known Allergies          Medications  Valid as of: August 07, 2017 -  8:49 AM    Generic Name Brand Name Tablet Size Instructions for use    Atorvastatin Calcium (Tab) LIPITOR 10 MG Take 1 Tab by mouth every day.        BusPIRone HCl (Tab) BUSPAR 30 MG TK 1 T PO BID        FLUoxetine HCl (Cap) PROZAC 10 MG TK ONE C PO  QHS        Fluticasone Propionate (Suspension) FLONASE 50 MCG/ACT SPRAYS 2 SPRAYS IN EACH NOSTRIL QD        LORazepam (Tab) ATIVAN 0.5 MG TK 1 T PO  ONCE A DAY PRN        OLANZapine (Tab) ZYPREXA 5 MG TK 1 T PO QD        Omeprazole Magnesium (Tablet Delayed Response) PRILOSEC OTC 20 MG Take 1 Tab by mouth every day.        Testosterone (Gel) Testosterone 20.25 MG/1.25GM (1.62%) Apply 1 Spray to skin as directed every day.        Testosterone (Gel) ANDROGEL PUMP 20.25 MG/ACT (1.62%) Apply 2 Sprays to affected area(s) every day. Each arm        Tretinoin (Cream) RETIN-A 0.1 % Apply very small amt to acne at night        .                 Medicines prescribed today were sent to:     [id number]  [place] - [address] AT [facility] [address]    [address] [address]    Phone: [phone] Fax: [phone]    Open 24  Hours?: No      Medication refill instructions:       If your prescription bottle indicates you have medication refills left, it is not necessary to call your provider’s office. Please contact your pharmacy and they will refill your medication.    If your prescription bottle indicates you do not have any refills left, you may request refills at any time through one of the following ways: The online RivalHealth system (except Urgent Care), by calling your provider’s office, or by asking your pharmacy to contact your provider’s office with a refill request. Medication refills are processed only during regular business hours and may not be available until the next business day. Your provider may request additional information or to have a follow-up visit with you prior to refilling your medication.   *Please Note: Medication refills are assigned a new Rx number when refilled electronically. Your pharmacy may indicate that no refills were authorized even though a new prescription for the same medication is available at the pharmacy. Please request the medicine by name with the pharmacy before contacting your provider for a refill.        Referral     A referral request has been sent to our patient care coordination department. Please allow 3-5 business days for us to process this request and contact you either by phone or mail. If you do not hear from us by the 5th business day, please call us at [phone].        Other Notes About Your Plan     7/3/17 Dr Crook consult appt. See notes.           RivalHealth Access Code: [email or web address]  Expires: 8/24/2017 12:13 PM    RivalHealth  A secure, online tool to manage your health information     [employer]’s RivalHealth® is a secure, online tool that connects you to your personalized health information from the privacy of your home -- day or night - making it very easy for you to manage your healthcare. Once the activation process is completed, you can even access your medical  information using the MobileForce Software naya, which is available for free in the Apple Naya store or Google Play store.     MobileForce Software provides the following levels of access (as shown below):   My Chart Features   [facility] Primary [facility] Doctor [facility]  Specialists [facility]  Urgent  Care Non-[facility]  Primary Care  Doctor   Email your healthcare team securely and privately 24/7 X X X    Manage appointments: schedule your next appointment; view details of past/upcoming appointments X      Request prescription refills. X      View recent personal medical records, including lab and immunizations X X X X   View health record, including health history, allergies, medications X X X X   Read reports about your outpatient visits, procedures, consult and ER notes X X X X   See your discharge summary, which is a recap of your hospital and/or ER visit that includes your diagnosis, lab results, and care plan. X X       How to register for MobileForce Software:  1. Go to  [email or web address].  2. Click on the Sign Up Now box, which takes you to the New Member Sign Up page. You will need to provide the following information:  a. Enter your MobileForce Software Access Code exactly as it appears at the top of this page. (You will not need to use this code after you’ve completed the sign-up process. If you do not sign up before the expiration date, you must request a new code.)   b. Enter your date of birth.   c. Enter your home email address.   d. Click Submit, and follow the next screen’s instructions.  3. Create a MobileForce Software ID. This will be your MobileForce Software login ID and cannot be changed, so think of one that is secure and easy to remember.  4. Create a MobileForce Software password. You can change your password at any time.  5. Enter your Password Reset Question and Answer. This can be used at a later time if you forget your password.   6. Enter your e-mail address. This allows you to receive e-mail notifications when new information is available in MobileForce Software.  7. Click Sign Up. You can now  view your health information.    For assistance activating your "[employer] account, call [phone]

## 2017-08-07 NOTE — ASSESSMENT & PLAN NOTE
Pt has had difficulty w Sleep after stopping Trazodone and Seroquel and taking Lorazepam at night and Zyprexa.   States sleep interupted.  Recommended he call Psychiatry.

## 2017-08-07 NOTE — ASSESSMENT & PLAN NOTE
"Pt reports has seen ENT MD r/t deviated septum  Reportedly has excess tissue in \"throat\" and recommends Tonsillectomy  IS going to have Sleep Study first and will f/u ENT.  "

## 2017-09-18 DIAGNOSIS — Z87.19 HISTORY OF BARRETT'S ESOPHAGUS: ICD-10-CM

## 2017-09-18 DIAGNOSIS — K21.00 GASTROESOPHAGEAL REFLUX DISEASE WITH ESOPHAGITIS: ICD-10-CM

## 2017-09-18 RX ORDER — OMEPRAZOLE 20 MG/1
TABLET, DELAYED RELEASE ORAL
Qty: 30 TAB | Refills: 0 | Status: SHIPPED | OUTPATIENT
Start: 2017-09-18 | End: 2017-10-17 | Stop reason: SDUPTHER

## 2017-10-05 ENCOUNTER — OFFICE VISIT (OUTPATIENT)
Dept: MEDICAL GROUP | Facility: MEDICAL CENTER | Age: 47
End: 2017-10-05
Attending: NURSE PRACTITIONER
Payer: MEDICARE

## 2017-10-05 VITALS
HEIGHT: 76 IN | HEART RATE: 100 BPM | RESPIRATION RATE: 20 BRPM | WEIGHT: 248 LBS | OXYGEN SATURATION: 93 % | BODY MASS INDEX: 30.2 KG/M2 | SYSTOLIC BLOOD PRESSURE: 128 MMHG | TEMPERATURE: 97.7 F | DIASTOLIC BLOOD PRESSURE: 88 MMHG

## 2017-10-05 DIAGNOSIS — Z87.19 HISTORY OF BARRETT'S ESOPHAGUS: ICD-10-CM

## 2017-10-05 DIAGNOSIS — L70.0 ACNE VULGARIS: ICD-10-CM

## 2017-10-05 DIAGNOSIS — E66.9 OBESITY (BMI 35.0-39.9 WITHOUT COMORBIDITY): ICD-10-CM

## 2017-10-05 DIAGNOSIS — R06.83 SNORES: ICD-10-CM

## 2017-10-05 DIAGNOSIS — F99 PSYCHIATRIC DISORDER: ICD-10-CM

## 2017-10-05 DIAGNOSIS — R21 RASH: ICD-10-CM

## 2017-10-05 DIAGNOSIS — K21.00 GASTROESOPHAGEAL REFLUX DISEASE WITH ESOPHAGITIS: ICD-10-CM

## 2017-10-05 DIAGNOSIS — E34.9 TESTOSTERONE INSUFFICIENCY: ICD-10-CM

## 2017-10-05 PROCEDURE — 99213 OFFICE O/P EST LOW 20 MIN: CPT | Performed by: NURSE PRACTITIONER

## 2017-10-05 PROCEDURE — 99214 OFFICE O/P EST MOD 30 MIN: CPT | Performed by: NURSE PRACTITIONER

## 2017-10-05 RX ORDER — TESTOSTERONE 16.2 MG/G
GEL TRANSDERMAL
Qty: 20 G | Refills: 2 | Status: SHIPPED | OUTPATIENT
Start: 2017-10-05 | End: 2018-03-26 | Stop reason: SDUPTHER

## 2017-10-05 RX ORDER — TRIAMCINOLONE ACETONIDE 1 MG/G
CREAM TOPICAL
Qty: 1 TUBE | Refills: 2 | Status: SHIPPED | OUTPATIENT
Start: 2017-10-05 | End: 2018-10-24

## 2017-10-05 NOTE — ASSESSMENT & PLAN NOTE
Pt reports gets rash around neck and believes related to his collared shirts.  Pt denies food allergies.

## 2017-10-05 NOTE — ASSESSMENT & PLAN NOTE
Pt has not called Derm in [place].  Still using Retin A. Discussed importance of establish with   Dermatology.

## 2017-10-05 NOTE — PROGRESS NOTES
Chief Complaint: f/u on snoring, new rash     HPI:  Jared presents to the clinic for follow up on    His PMH includes:  Acne  Anxiety,Depression, Bipolar Disorder  Testosterone Use/Low Testosterone  GERD  Lawrence's Esophagus hx  Hernia Repair  Hyperlipidemia, mild ( new)  Nasal Septum Deviation with Mucosa thickening ( new)     Established with  Psychiatry- Isra Kate ( in Dr Champagne office)     Referrals Approved:  [facility] for Sleep Study  Dr Samayoa for deviated nasal septum ( seen 7/3/17)  GI consultants fo rGERlv, Hx of Lawrence's Esophagus  Dermatology- [facility]        REview of REcords:  8/7/17 Clinic visit for Results, Acne concerns--> Refer Derm, RX Lipitor, Retin-A (1 x rx), Androgel RX  7/18/17 Referred to ENT-DR Crook based on CT MAx/Facial  6/22/17 New Patient visit here at Clinic. Labs ordered. Referred to Sleep Study, Referred to ANNA Ryan  Report shows  9/20/17 Androgel 1.62% Gel pump 75 ( 60 days) Me  9/12/17 Ambien 10 mg # 7 Isra Kate  9/12/17 Xanax 0.25 # 90 Isra Solaresochalsherrie  8/16/17 Xanax 0.25 # 60 Isra Solaresochalsherrie  8/10/17 Lorazepam 0.5 # 34 Isra AGUSTIN  8/6/17 Androgel 1.62 % Gel Pump by me  7/16/17 Lorazepam 0.5 mg # 30 by Isra Kate (Psychiatry)  6/22/17 Androgel 1.62 Gel Pump 75 by me  6/15/17 Lorazepam 0.5 mg # 28 by Isra Kate (Psychiatry)  5/30/17 Lorazepam 0.5 # 14 by Isra Kate  4/15/17 Androgel 1.62 %  75 (30 days) Adán Galindo ([place])  More similar Androgel and Lorazepam entries in report    Snores  Pt reports this past w/e had Sleep Study in [place].  No results yet.  Has f/u appt next week.  Continues to snore.  Has difficulty.    Psychiatric disorder  Pt has insomnia and Bipolar.  Estalished with Isra Kate for Psychiatry  Taking meds   No suicidal.    Acne vulgaris  Pt has not called Derm in [place].  Still using Retin A. Discussed importance of establish with   Dermatology.      Rash  Pt reports gets rash  around neck and believes related to his collared shirts.  Pt denies food allergies.    Gastroesophageal reflux disease with esophagitis  Pt reports is taking Omeprazole and helping his GERD.  Hx of Lawrence's esophagus.  Was referred to GI but GI consultants not accepting his insurance  Due to the nature of his disease, GI involvement is a good idea  Given his Lawrence's Esophagus.  Pt wants another Scope for prevention      Patient Active Problem List    Diagnosis Date Noted   • Rash 10/05/2017   • Obesity (BMI 35.0-39.9 without comorbidity) ([facility]) 10/05/2017   • Hyperlipidemia, mild 08/07/2017   • Acne vulgaris 08/07/2017   • Routine health maintenance 06/22/2017   • Psychiatric disorder 06/22/2017   • Testosterone insufficiency 06/22/2017   • History of Lawrence's esophagus 06/22/2017   • Gastroesophageal reflux disease with esophagitis 06/22/2017   • Deviated nasal septum 06/22/2017   • Snores 06/22/2017       Allergies:Review of patient's allergies indicates no known allergies.    Current Outpatient Prescriptions   Medication Sig Dispense Refill   • triamcinolone acetonide (KENALOG) 0.1 % Cream Apply 1- 2 x a day as needed for rash to neck 1 Tube 2   • ANDROGEL PUMP 20.25 MG/ACT (1.62%) Gel 2 Sprays to arm 20 g 2   • omeprazole (PRILOSEC OTC) 20 MG tablet TAKE 1 TABLET BY MOUTH EVERY DAY 30 Tab 0   • fluoxetine (PROZAC) 10 MG Cap TK ONE C PO  QHS  0   • olanzapine (ZYPREXA) 5 MG Tab TK 1 T PO QD  0   • fluticasone (FLONASE) 50 MCG/ACT nasal spray SPRAYS 2 SPRAYS IN EACH NOSTRIL QD  0   • atorvastatin (LIPITOR) 10 MG Tab Take 1 Tab by mouth every day. 30 Tab 5   • tretinoin (RETIN-A) 0.1 % cream Apply very small amt to acne at night 45 g 0     No current facility-administered medications for this visit.        Social History   Substance Use Topics   • Smoking status: Never Smoker   • Smokeless tobacco: Never Used   • Alcohol use No       Family History   Problem Relation Age of Onset   • Arthritis Father    • Heart  "Disease Maternal Uncle    • Psychiatry Paternal Aunt    • Heart Disease Maternal Grandmother    • Lung Disease Maternal Grandfather    • Cancer Maternal Grandfather    • Cancer Paternal Grandfather        ROS:  Review of Systems   See HPI Above    Exam:  Blood pressure 128/88, pulse 100, temperature 36.5 °C (97.7 °F), resp. rate 20, height 1.93 m (6' 3.98\"), weight 112.5 kg (248 lb), SpO2 93 %.  General:  Well nourished, over-weight, well developed male in NAD  HENT:Head is grossly normal. PERRL.  Neck: Supple. Trachea is midline. Scant macular rash to lateral neck bilat.  Pulmonary: Clear to ausculation .  Normal effort. No rales, ronchi, or wheezing.   Cardiovascular: Regular rate and rhythm.  Abdomen-Abdomen is soft, No tenderness.  Upper extremities- Good ROM  Lower extremities- neg for edema, redness, tenderness.  Neuro- A & O x 4. Speech clear and appropriate.     Current medications, allergies, and problem list reviewed with patient and updated in  [facility] today.    Assessment/Plan:  1. Snores  Awaiting results from Sleep Study. Anticipate pos for sleep apnea. Pt to f/u w [facility].   2. Psychiatric disorder  Continue w Isra Krochalsherrie and meds per him   3. Acne vulgaris  Pt to call Riley Cruz and establish for consideration of ongoing Retin-A medication.   4. Rash  triamcinolone acetonide (KENALOG) 0.1 % Cream   5. Testosterone insufficiency  ANDROGEL PUMP 20.25 MG/ACT (1.62%) Gel  Refill to last 30 days (2 pumps to arm daily)   6. Gastroesophageal reflux disease with esophagitis  RE-REFERRAL TO GASTROENTEROLOGY  Continue daily Omeprazole.   7. History of Lawrence's esophagus  REFERRAL TO GASTROENTEROLOGY   8. Obesity (BMI 35.0-39.9 without comorbidity)  Patient identified as having weight management issue.  Appropriate orders and counseling given.   Follow up in 3 months. Call or return if questions, concerns, or worsening condition.  "

## 2017-10-05 NOTE — ASSESSMENT & PLAN NOTE
Pt reports this past w/e had Sleep Study in [place].  No results yet.  Has f/u appt next week.  Continues to snore.  Has difficulty.

## 2017-10-05 NOTE — ASSESSMENT & PLAN NOTE
Pt reports is taking Omeprazole and helping his GERD.  Hx of Lawrence's esophagus.  Was referred to GI but GI consultants not accepting his insurance  Due to the nature of his disease, GI involvement is a good idea  Given his Lawrence's Esophagus.  Pt wants another Scope for prevention

## 2017-10-05 NOTE — ASSESSMENT & PLAN NOTE
Pt has insomnia and Bipolar.  Estalished with Isra Kate for Psychiatry  Taking meds   No suicidal.

## 2017-10-17 DIAGNOSIS — K21.00 GASTROESOPHAGEAL REFLUX DISEASE WITH ESOPHAGITIS: ICD-10-CM

## 2017-10-17 DIAGNOSIS — Z87.19 HISTORY OF BARRETT'S ESOPHAGUS: ICD-10-CM

## 2017-10-17 RX ORDER — OMEPRAZOLE 20 MG/1
TABLET, DELAYED RELEASE ORAL
Qty: 30 TAB | Refills: 5 | Status: SHIPPED | OUTPATIENT
Start: 2017-10-17 | End: 2018-02-22

## 2017-10-30 ENCOUNTER — OFFICE VISIT (OUTPATIENT)
Dept: MEDICAL GROUP | Facility: MEDICAL CENTER | Age: 47
End: 2017-10-30
Attending: NURSE PRACTITIONER
Payer: MEDICARE

## 2017-10-30 VITALS
HEIGHT: 76 IN | SYSTOLIC BLOOD PRESSURE: 115 MMHG | HEART RATE: 88 BPM | WEIGHT: 254 LBS | RESPIRATION RATE: 16 BRPM | DIASTOLIC BLOOD PRESSURE: 80 MMHG | OXYGEN SATURATION: 96 % | TEMPERATURE: 97.7 F | BODY MASS INDEX: 30.93 KG/M2

## 2017-10-30 DIAGNOSIS — E34.9 TESTOSTERONE INSUFFICIENCY: ICD-10-CM

## 2017-10-30 DIAGNOSIS — R22.2 LUMP IN CHEST: ICD-10-CM

## 2017-10-30 DIAGNOSIS — K21.00 GASTROESOPHAGEAL REFLUX DISEASE WITH ESOPHAGITIS: ICD-10-CM

## 2017-10-30 DIAGNOSIS — M54.2 NECK DISCOMFORT: ICD-10-CM

## 2017-10-30 PROCEDURE — 99213 OFFICE O/P EST LOW 20 MIN: CPT | Performed by: NURSE PRACTITIONER

## 2017-10-30 PROCEDURE — 99214 OFFICE O/P EST MOD 30 MIN: CPT | Performed by: NURSE PRACTITIONER

## 2017-10-30 RX ORDER — PREDNISONE 10 MG/1
TABLET ORAL
Qty: 15 TAB | Refills: 0 | Status: SHIPPED | OUTPATIENT
Start: 2017-10-30 | End: 2018-02-22

## 2017-10-30 ASSESSMENT — PAIN SCALES - GENERAL: PAINLEVEL: NO PAIN

## 2017-10-30 NOTE — ASSESSMENT & PLAN NOTE
Pt reports GERD is okay as long as taking Omeprazole.  Also has Lawrence's esophagus and now has appt w GI Consultants  12/11/17

## 2017-10-30 NOTE — PROGRESS NOTES
"Chief Complaint: Left Chest \"lump\"    HPI:  Jared presents to the clinic as same day appt for  \"lump\" under left breast at rib margin,    His PMH includes    Acne  Anxiety,Depression, Bipolar Disorder  Testosterone Use/Low Testosterone  GERD  Lawrence's Esophagus hx  Hernia Repair  Hyperlipidemia, mild ( new)  Nasal Septum Deviation with Mucosa thickening ( new)     Established with  Psychiatry- Isra Kate ( in Dr Champagne office)     Referrals Approved:  [facility] for Sleep Study  Dr Samayoa for deviated nasal septum ( seen 7/3/17)  GI consultants myla Martinez, Hx of Lawrence's Esophagus  Dermatology- [facility]        REview of REcords:  10/5/17 Clinic visit for f/u on Snoring, Rash---> Rx for Kenalog, RX Androgel, RE-refer GI  8/7/17 Clinic visit for Results, Acne concerns--> Refer Derm, RX Lipitor, Retin-A (1 x rx), Androgel RX  7/18/17 Referred to ENT-DR Crook based on CT MAx/Facial  6/22/17 New Patient visit here at Clinic. Labs ordered. Referred to Sleep Study, Referred to GI     [place]  Report shows  10/12/17 Xanax 0.25 mg # 60 by Isra Kate (Psychiatry)  10/5/17 Androgel 1.62 % Gel pump 75 (20) days)  9/20/17 Androgel 1.62% Gel pump 75 ( 60 days) Me  9/12/17 Ambien 10 mg # 7 Isra Kate  9/12/17 Xanax 0.25 # 90 Isra Kate  8/16/17 Xanax 0.25 # 60 Isra Kate  8/10/17 Lorazepam 0.5 # 34 Isra AGUSTIN  8/6/17 Androgel 1.62 % Gel Pump by me  7/16/17 Lorazepam 0.5 mg # 30 by Isra Kate (Psychiatry)  6/22/17 Androgel 1.62 Gel Pump 75 by me  6/15/17 Lorazepam 0.5 mg # 28 by Isra Kate (Psychiatry)  5/30/17 Lorazepam 0.5 # 14 by Isra Kate  4/15/17 Androgel 1.62 %  75 (30 days) Adán Galindo ([place])  More similar Androgel and Lorazepam entries in report        Lump in chest  Pt reports noticed a \"lump\" under left breast along rib margin x 3 days.  No known injury. Denies fever.  Hx of Pectus escava. Surgery.  Hurts slightly to left chest lump area to lift arm " up.  Denies SOB.      Testosterone insufficiency  Pt has known Testosterone deficiency and reports insurance will not authorize the Gel starting Jan 1st. Pt wanting Gel vs Testosterone shot but would take shot if needed. We discussed we will need to address that in Dec/Tyrell if he is denied testosterone gel.  Reports testosterone gel working well. Has not had injectable in past.    Gastroesophageal reflux disease with esophagitis  Pt reports GERD is okay as long as taking Omeprazole.  Also has Lawrence's esophagus and now has appt w GI Consultants  12/11/17    Neck discomfort  Pt reports has on and off neck discomfort.  Wants to be referred to Chiropractor. Has not tried any medication.  Denies any known trauma to neck. Denies hand tingling or weakness.  We discussed this is typically not covered by his insurance  But Physical Therapy is.      Patient Active Problem List    Diagnosis Date Noted   • Lump in chest 10/30/2017   • Neck discomfort 10/30/2017   • Rash 10/05/2017   • Obesity (BMI 35.0-39.9 without comorbidity) ([facility]) 10/05/2017   • Hyperlipidemia, mild 08/07/2017   • Acne vulgaris 08/07/2017   • Routine health maintenance 06/22/2017   • Psychiatric disorder 06/22/2017   • Testosterone insufficiency 06/22/2017   • History of Lawrence's esophagus 06/22/2017   • Gastroesophageal reflux disease with esophagitis 06/22/2017   • Deviated nasal septum 06/22/2017   • Snores 06/22/2017       Allergies:Review of patient's allergies indicates no known allergies.    Current Outpatient Prescriptions   Medication Sig Dispense Refill   • predniSONE (DELTASONE) 10 MG Tab Take 10 mg twice a day for 5 days, then once a day for five days. 15 Tab 0   • omeprazole (PRILOSEC OTC) 20 MG tablet TAKE 1 CAPSULE BY MOUTH EVERY DAY 30 Tab 5   • triamcinolone acetonide (KENALOG) 0.1 % Cream Apply 1- 2 x a day as needed for rash to neck 1 Tube 2   • ANDROGEL PUMP 20.25 MG/ACT (1.62%) Gel 2 Sprays to arm 20 g 2   • fluoxetine (PROZAC) 10 MG  "Cap TK ONE C PO  QHS  0   • olanzapine (ZYPREXA) 5 MG Tab TK 1 T PO QD  0   • fluticasone (FLONASE) 50 MCG/ACT nasal spray SPRAYS 2 SPRAYS IN EACH NOSTRIL QD  0   • atorvastatin (LIPITOR) 10 MG Tab Take 1 Tab by mouth every day. 30 Tab 5   • tretinoin (RETIN-A) 0.1 % cream Apply very small amt to acne at night 45 g 0     No current facility-administered medications for this visit.        Social History   Substance Use Topics   • Smoking status: Never Smoker   • Smokeless tobacco: Never Used   • Alcohol use No       Family History   Problem Relation Age of Onset   • Arthritis Father    • Heart Disease Maternal Uncle    • Psychiatry Paternal Aunt    • Heart Disease Maternal Grandmother    • Lung Disease Maternal Grandfather    • Cancer Maternal Grandfather    • Cancer Paternal Grandfather        ROS:  Review of Systems   See HPI Above    Exam:  Blood pressure 115/80, pulse 88, temperature 36.5 °C (97.7 °F), resp. rate 16, height 1.93 m (6' 3.98\"), weight 115.2 kg (254 lb), SpO2 96 %.  General:  Well nourished, well developed male in NAD  HENT:Head is grossly normal. PERRL.  Neck: Supple. Trachea is midline. GOOD ROM. No C-spine tenderness.  Pulmonary: Clear to ausculation .  Normal effort. No rales, ronchi, or wheezing.   Chest: left costal margin soft tissue swelling w/o bruising. Scant tenderness to touch.   No axilla tenderness or lumps on palpation.  Cardiovascular: Regular rate and rhythm.  Abdomen-Abdomen is soft, No tenderness.  Upper extremities- Strong = . Good ROM  Lower extremities- neg for edema, redness, tenderness.  Neuro- A & O x 4. Speech clear and appropriate.     Current medications, allergies, and problem list reviewed with patient and updated in  EPIC today.    Assessment/Plan:  1. Lump in chest  DX-CHEST-2 VIEWS    US-CHEST    predniSONE (DELTASONE) 10 MG Tab   2. Testosterone insufficiency  Continue Androgel. If denies in 2018 either try Prior Auth or change to Testosterone injections.   3. " Gastroesophageal reflux disease with esophagitis  Continue Omeprazole.   4. Neck discomfort  DX-CERVICAL SPINE-2 OR 3 VIEWS    REFERRAL TO PHYSICAL THERAPY Reason for Therapy: Eval/Treat/Report    Follow up in 3-4 wks. Call or return if questions, concerns, or worsening condition.

## 2017-10-30 NOTE — ASSESSMENT & PLAN NOTE
Pt reports has on and off neck discomfort.  Wants to be referred to Chiropractor.  We discussed this is typically not covered by his insurance  But Physical Therapy is.

## 2017-10-30 NOTE — ASSESSMENT & PLAN NOTE
"Pt reports noticed a \"lump\" under left breast along rib margin x 3 days.  No known injury.  Hx of Pectus escava. Surgery.  Hurts to lift arm up.  Denies SOB.    "

## 2017-10-30 NOTE — ASSESSMENT & PLAN NOTE
Pt has known Testosterone deficiency and reports insurance will not authorize the Gel starting Jan 1st. Pt wanting Gel vs Testosterone shot but would take shot if needed.

## 2017-11-01 ENCOUNTER — HOSPITAL ENCOUNTER (OUTPATIENT)
Dept: RADIOLOGY | Facility: MEDICAL CENTER | Age: 47
End: 2017-11-01
Attending: NURSE PRACTITIONER
Payer: MEDICARE

## 2017-11-01 DIAGNOSIS — N64.4 BREAST PAIN, LEFT: ICD-10-CM

## 2017-11-01 DIAGNOSIS — R22.2 LUMP IN CHEST: ICD-10-CM

## 2017-11-02 ENCOUNTER — HOSPITAL ENCOUNTER (OUTPATIENT)
Dept: RADIOLOGY | Facility: MEDICAL CENTER | Age: 47
End: 2017-11-02
Attending: NURSE PRACTITIONER
Payer: MEDICARE

## 2017-11-02 DIAGNOSIS — N64.4 BREAST PAIN, LEFT: ICD-10-CM

## 2017-11-02 DIAGNOSIS — R22.2 LUMP IN CHEST: ICD-10-CM

## 2017-11-02 DIAGNOSIS — M54.2 NECK DISCOMFORT: ICD-10-CM

## 2017-11-02 PROCEDURE — 76642 ULTRASOUND BREAST LIMITED: CPT | Mod: LT

## 2017-11-02 PROCEDURE — 72040 X-RAY EXAM NECK SPINE 2-3 VW: CPT

## 2017-11-02 PROCEDURE — 76604 US EXAM CHEST: CPT

## 2017-11-02 PROCEDURE — 71020 DX-CHEST-2 VIEWS: CPT

## 2017-11-13 ENCOUNTER — OFFICE VISIT (OUTPATIENT)
Dept: MEDICAL GROUP | Facility: MEDICAL CENTER | Age: 47
End: 2017-11-13
Attending: NURSE PRACTITIONER
Payer: MEDICARE

## 2017-11-13 ENCOUNTER — APPOINTMENT (OUTPATIENT)
Dept: PHYSICAL THERAPY | Facility: REHABILITATION | Age: 47
End: 2017-11-13
Attending: NURSE PRACTITIONER
Payer: MEDICARE

## 2017-11-13 VITALS
HEIGHT: 76 IN | BODY MASS INDEX: 31.29 KG/M2 | SYSTOLIC BLOOD PRESSURE: 104 MMHG | HEART RATE: 84 BPM | TEMPERATURE: 97.8 F | RESPIRATION RATE: 16 BRPM | WEIGHT: 257 LBS | DIASTOLIC BLOOD PRESSURE: 60 MMHG | OXYGEN SATURATION: 93 %

## 2017-11-13 DIAGNOSIS — R22.2 LUMP IN CHEST: ICD-10-CM

## 2017-11-13 DIAGNOSIS — M54.2 NECK DISCOMFORT: ICD-10-CM

## 2017-11-13 DIAGNOSIS — H54.7 POOR VISION: ICD-10-CM

## 2017-11-13 DIAGNOSIS — E34.9 TESTOSTERONE INSUFFICIENCY: ICD-10-CM

## 2017-11-13 PROCEDURE — 99213 OFFICE O/P EST LOW 20 MIN: CPT | Performed by: NURSE PRACTITIONER

## 2017-11-13 PROCEDURE — 99212 OFFICE O/P EST SF 10 MIN: CPT | Performed by: NURSE PRACTITIONER

## 2017-11-13 RX ORDER — METHOCARBAMOL 500 MG/1
500 TABLET, FILM COATED ORAL NIGHTLY PRN
Qty: 30 TAB | Refills: 0 | Status: SHIPPED | OUTPATIENT
Start: 2017-11-13 | End: 2017-11-15

## 2017-11-13 NOTE — ASSESSMENT & PLAN NOTE
"We reviewed the results of his CXR and U/S breast showing probable relation to Pectus Excavatum repair and healing in past  And no mass identified.    Pt reports the lump in left breast is \"less\" and not bothering him so much  No fever or chills.  Discussed if worsens can look at again.    "

## 2017-11-13 NOTE — ASSESSMENT & PLAN NOTE
We reviewed his C spine Xray REport  Showing moderate C5-6 and Mild C6-7 degenerative changes.  Pt reports his neck has been feeling better and has appt w Physical therapy.  Wants to do Chiropractor and discussed that would be okay.

## 2017-11-13 NOTE — PROGRESS NOTES
Chief Complaint: REsults, concern about testosterone RX in future    HPI:  Jared presents to the clinic for Results.    His PMH includes:  Acne  Anxiety,Depression, Bipolar Disorder  Testosterone Use/Low Testosterone  GERD  Lawrence's Esophagus hx  Hernia Repair  Hyperlipidemia, mild ( new)  Nasal Septum Deviation with Mucosa thickening ( new)     Established with  Psychiatry- Isra Kate ( in Dr Champagne office)     Referrals Approved:  [facility] for Sleep Study  Dr Samayoa for deviated nasal septum ( seen 7/3/17)  GI consultants fo rGERd, Hx of Lawrence's Esophagus  Dermatology- [facility]        REview of REcords:  10/30/17 Clinic Visit for Left Chest Lump under left rib margin, Neck pain. Xray of C-spine ordered, Referral to PT, U/S chest and CXR ordered.  10/5/17 Clinic visit for f/u on Snoring, Rash---> Rx for Kenalog, RX Androgel, RE-refer GI  8/7/17 Clinic visit for Results, Acne concerns--> Refer Derm, RX Lipitor, Retin-A (1 x rx), Androgel RX  7/18/17 Referred to ENT-DR Crook based on CT MAx/Facial  6/22/17 New Patient visit here at Clinic. Labs ordered. Referred to Sleep Study, Referred to GI     [place]  Report shows  10/12/17 Xanax 0.25 mg # 60 by Isra Kate (Psychiatry)  10/5/17 Androgel 1.62 % Gel pump 75 (20) days)  9/20/17 Androgel 1.62% Gel pump 75 ( 60 days) Me  9/12/17 Ambien 10 mg # 7 Isra Kate  9/12/17 Xanax 0.25 # 90 Isra Kate  8/16/17 Xanax 0.25 # 60 Isra Kate  8/10/17 Lorazepam 0.5 # 34 Isra AGUSTIN  8/6/17 Androgel 1.62 % Gel Pump by me  7/16/17 Lorazepam 0.5 mg # 30 by Isra Kate (Psychiatry)  6/22/17 Androgel 1.62 Gel Pump 75 by me  6/15/17 Lorazepam 0.5 mg # 28 by Isra Kate (Psychiatry)  5/30/17 Lorazepam 0.5 # 14 by Isra Kate  4/15/17 Androgel 1.62 %  75 (30 days) Adán Galindo ([place])  More similar Androgel and Lorazepam entries in repo    Results Review:  11/2/17 C-spine Xray Report:  Moderate C5/6, mild C6/7  "spondylosis    11/2/17 CXR Report  No radiographic evidence of acute abnormality  Pectus excavatum    11/2/17 U/S Mammo Left Breast Report  Mild asymmetry of the costochondral cartilage, left thicker than right but a defined mass is not seen. This is of doubtful significance   and may reflect long-term sequela of pectus repair. However, continued clinical follow-up is advised and if symptoms   worsen, cross-sectional imaging with MRI over CT may prove helpful  These findings were discussed with the patient and all questions were addressed.    Lump in chest  We reviewed the results of his CXR and U/S breast showing probable relation to Pectus Excavatum repair and healing in past  And no mass identified.    Pt reports the lump in left breast is \"less\" and not bothering him so much  No fever or chills.  Discussed if worsens can look at again.      Neck discomfort  We reviewed his C spine Xray REport  Showing moderate C5-6 and Mild C6-7 degenerative changes.  Pt reports his neck has been feeling better and has appt w Physical therapy.  Wants to do Chiropractor and discussed that would be okay.      Testosterone insufficiency  Pt worried his insurance will not covered for androgel starting in Jan.  Pt interested in late dec or Jan to learn how to do injections.  Has done Androgen currently.    Poor vision  Pt reports he has poor vision especially distance and would like to   See Optometrist for vision check and glasses.  Never had glasses.  I have given him info on [facility] and to call for appt.      Patient Active Problem List    Diagnosis Date Noted   • Poor vision 11/13/2017   • Lump in chest 10/30/2017   • Neck discomfort 10/30/2017   • Rash 10/05/2017   • Obesity (BMI 35.0-39.9 without comorbidity) ([facility]) 10/05/2017   • Hyperlipidemia, mild 08/07/2017   • Acne vulgaris 08/07/2017   • Routine health maintenance 06/22/2017   • Psychiatric disorder 06/22/2017   • Testosterone insufficiency 06/22/2017   • " "History of Lawrence's esophagus 06/22/2017   • Gastroesophageal reflux disease with esophagitis 06/22/2017   • Deviated nasal septum 06/22/2017   • Snores 06/22/2017       Allergies:Review of patient's allergies indicates no known allergies.    Current Outpatient Prescriptions   Medication Sig Dispense Refill   • methocarbamol (ROBAXIN) 500 MG Tab Take 1 Tab by mouth at bedtime as needed. 30 Tab 0   • predniSONE (DELTASONE) 10 MG Tab Take 10 mg twice a day for 5 days, then once a day for five days. 15 Tab 0   • omeprazole (PRILOSEC OTC) 20 MG tablet TAKE 1 CAPSULE BY MOUTH EVERY DAY 30 Tab 5   • triamcinolone acetonide (KENALOG) 0.1 % Cream Apply 1- 2 x a day as needed for rash to neck 1 Tube 2   • ANDROGEL PUMP 20.25 MG/ACT (1.62%) Gel 2 Sprays to arm 20 g 2   • fluoxetine (PROZAC) 10 MG Cap TK ONE C PO  QHS  0   • olanzapine (ZYPREXA) 5 MG Tab TK 1 T PO QD  0   • fluticasone (FLONASE) 50 MCG/ACT nasal spray SPRAYS 2 SPRAYS IN EACH NOSTRIL QD  0   • atorvastatin (LIPITOR) 10 MG Tab Take 1 Tab by mouth every day. 30 Tab 5   • tretinoin (RETIN-A) 0.1 % cream Apply very small amt to acne at night 45 g 0     No current facility-administered medications for this visit.        Social History   Substance Use Topics   • Smoking status: Never Smoker   • Smokeless tobacco: Never Used   • Alcohol use No       Family History   Problem Relation Age of Onset   • Arthritis Father    • Heart Disease Maternal Uncle    • Psychiatry Paternal Aunt    • Heart Disease Maternal Grandmother    • Lung Disease Maternal Grandfather    • Cancer Maternal Grandfather    • Cancer Paternal Grandfather        ROS:  Review of Systems   See HPI Above        Exam:  Blood pressure 104/60, pulse 84, temperature 36.6 °C (97.8 °F), resp. rate 16, height 1.93 m (6' 3.98\"), weight 116.6 kg (257 lb), SpO2 93 %.  General:  Well nourished, well developed male in NAD  HENT:Head is grossly normal. PERRL.  Neck: Supple. Trachea is midline.  Pulmonary: Clear to " ausculation .  Normal effort. No rales, ronchi, or wheezing.   Cardiovascular: Regular rate and rhythm.  Abdomen-Abdomen is soft, No tenderness.  Upper extremities- Strong = . Good ROM  Lower extremities- neg for edema, redness, tenderness.  Neuro- A & O x 4. Speech clear and appropriate.     Current medications, allergies, and problem list reviewed with patient and updated in  EPIC today.    Assessment/Plan:  1. Lump in chest  Pt to monitor.    2. Neck discomfort  methocarbamol (ROBAXIN) 500 MG Tab  Pt to keep appt for Physical Therapy this week   3. Testosterone insufficiency  Continue androgel, In Jan discuss possible injections if insurance denies androgel, and then teach pt how to inject.   4. Poor vision  [facility] info and Pt to call for appt.   Follow up in 2 months. Call or return if questions, concerns, or worsening condition.

## 2017-11-13 NOTE — ASSESSMENT & PLAN NOTE
Pt worried his insurance will not covered for androgel starting in Jan.  Pt interested in late dec or Jan to learn how to do injections.  Has done Androgen currently.

## 2017-11-13 NOTE — ASSESSMENT & PLAN NOTE
Pt reports he has poor vision especially distance and would like to   See Optometrist for vision check and glasses.  Never had glasses.  I have given him info on [facility] and to call for appt.

## 2017-11-14 ENCOUNTER — PHYSICAL THERAPY (OUTPATIENT)
Dept: PHYSICAL THERAPY | Facility: REHABILITATION | Age: 47
End: 2017-11-14
Attending: NURSE PRACTITIONER
Payer: MEDICARE

## 2017-11-14 DIAGNOSIS — M54.2 CERVICALGIA: ICD-10-CM

## 2017-11-14 DIAGNOSIS — M54.2 NECK DISCOMFORT: ICD-10-CM

## 2017-11-14 PROCEDURE — G8978 MOBILITY CURRENT STATUS: HCPCS | Mod: CJ

## 2017-11-14 PROCEDURE — G8979 MOBILITY GOAL STATUS: HCPCS | Mod: CH

## 2017-11-14 PROCEDURE — 97162 PT EVAL MOD COMPLEX 30 MIN: CPT

## 2017-11-14 PROCEDURE — 97110 THERAPEUTIC EXERCISES: CPT

## 2017-11-14 ASSESSMENT — ENCOUNTER SYMPTOMS
ALLEVIATING FACTORS: HEAT APPLICATION
PAIN TIMING: INTERMITTENT
EXACERBATED BY: PROLONGED SITTING
PAIN SCALE AT LOWEST: 0
POSTURAL HEADACHE: 1
EXACERBATED BY: TYPING
PAIN SCALE AT HIGHEST: 6
ALLEVIATING FACTORS: EXERCISE
EXACERBATED BY: SITTING
QUALITY: CRAMPING
PAIN SCALE: 2
QUALITY: TIGHTNESS
QUALITY: DISCOMFORT
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
## 2017-11-15 ENCOUNTER — HOSPITAL ENCOUNTER (OUTPATIENT)
Dept: RADIOLOGY | Facility: MEDICAL CENTER | Age: 47
End: 2017-11-15
Attending: NURSE PRACTITIONER
Payer: MEDICARE

## 2017-11-15 DIAGNOSIS — M54.2 NECK DISCOMFORT: ICD-10-CM

## 2017-11-15 DIAGNOSIS — R22.2 LUMP IN CHEST: ICD-10-CM

## 2017-11-15 DIAGNOSIS — M62.838 MUSCLE SPASM: ICD-10-CM

## 2017-11-15 RX ORDER — TIZANIDINE 4 MG/1
4 TABLET ORAL NIGHTLY PRN
Qty: 30 TAB | Refills: 5 | Status: SHIPPED | OUTPATIENT
Start: 2017-11-15 | End: 2018-06-05

## 2017-11-20 ENCOUNTER — PHYSICAL THERAPY (OUTPATIENT)
Dept: PHYSICAL THERAPY | Facility: REHABILITATION | Age: 47
End: 2017-11-20
Attending: NURSE PRACTITIONER
Payer: MEDICARE

## 2017-11-20 DIAGNOSIS — M54.2 NECK DISCOMFORT: ICD-10-CM

## 2017-11-20 PROCEDURE — 97140 MANUAL THERAPY 1/> REGIONS: CPT

## 2017-11-20 PROCEDURE — 97014 ELECTRIC STIMULATION THERAPY: CPT

## 2017-11-20 PROCEDURE — 97110 THERAPEUTIC EXERCISES: CPT

## 2017-11-20 NOTE — OP THERAPY DAILY TREATMENT
Outpatient Physical Therapy  DAILY TREATMENT     [facility] Physical [address]  [address]  [place] [address]  Phone:  [phone]  Fax:  [phone]    Date: 11/20/2017    Patient: Jared Rg  YOB: 1970  MRN: [id number]     Time Calculation             Chief Complaint: No chief complaint on file.    Visit #: 2    SUBJECTIVE:  Onset of headache since last visit intermittent.  Stiffness at erom    OBJECTIVE:  Current objective measures:       Therapeutic Exercises (CPT 04040):     1. Repeated cervical retraction/extension seated    2. Posture correction    3. Cervical retraction/rotation    4. Superman on ball 3 x 1 min    5. Ther ex    Therapeutic Treatments and Modalities:     1. Manual Therapy (CPT 36478), 15 min minutes, cervicalc PA c3-7, oa/aa joint mob MET, cervical retraction/extension supine with overpressure    2. E Stim Unattended (CPT 38197), 15 min minutes, IFC with MHP cervical PS and upper trapis      Pain rating before treatment: 3  Pain rating after treatment: 1    ASSESSMENT:   Response to treatment: Patient requires cueing to avoid excessive upper c- spine extension during functional exercise.  Patient instructed to avoid push ups for now to avoid excessive flexion lower cervical spine.    PLAN/RECOMMENDATIONS:   Plan for treatment: therapy treatment to continue next visit.  Planned interventions for next visit: Continue with cervical retraction/extension and cervical stabilization during there ex.

## 2017-11-27 ENCOUNTER — APPOINTMENT (OUTPATIENT)
Dept: PHYSICAL THERAPY | Facility: REHABILITATION | Age: 47
End: 2017-11-27
Attending: NURSE PRACTITIONER
Payer: MEDICARE

## 2017-12-04 ENCOUNTER — APPOINTMENT (OUTPATIENT)
Dept: PHYSICAL THERAPY | Facility: REHABILITATION | Age: 47
End: 2017-12-04
Attending: NURSE PRACTITIONER
Payer: MEDICARE

## 2017-12-05 ENCOUNTER — APPOINTMENT (OUTPATIENT)
Dept: PHYSICAL THERAPY | Facility: REHABILITATION | Age: 47
End: 2017-12-05
Attending: NURSE PRACTITIONER
Payer: MEDICARE

## 2017-12-11 ENCOUNTER — PHYSICAL THERAPY (OUTPATIENT)
Dept: PHYSICAL THERAPY | Facility: REHABILITATION | Age: 47
End: 2017-12-11
Attending: NURSE PRACTITIONER
Payer: MEDICARE

## 2017-12-11 DIAGNOSIS — M54.2 NECK DISCOMFORT: ICD-10-CM

## 2017-12-11 PROCEDURE — 97140 MANUAL THERAPY 1/> REGIONS: CPT

## 2017-12-11 PROCEDURE — 97110 THERAPEUTIC EXERCISES: CPT

## 2017-12-11 PROCEDURE — 97014 ELECTRIC STIMULATION THERAPY: CPT

## 2017-12-11 NOTE — OP THERAPY DAILY TREATMENT
Outpatient Physical Therapy  DAILY TREATMENT     [facility] Physical [address]  [address]  [place] [address]  Phone:  [phone]  Fax:  [phone]    Date: 12/11/2017    Patient: Jared Rg  YOB: 1970  MRN: [id number]     Time Calculation  Start time: 1100          Chief Complaint: No chief complaint on file.    Visit #: 3    SUBJECTIVE: 25 % improved still intermittent episodes of cervical pain and headache    OBJECTIVE:  Current objective measures:        Therapeutic Exercises (CPT 85241):     Total treatment time spent on Therapeutic Exercises: 20 minutes.      1. Repeated cervical retraction/extension seated, 1 x20    2. Posture correction    3. Cervical retraction/rotation    4. Superman on ball 3 x 1 min    5. Ther ex    6. Prone ys and Ts single arm, 1 x 20 each bilateral UE    Therapeutic Treatments and Modalities:     1. Manual Therapy (CPT 50259), 15 min minutes, cervicalc PA c3-7, oa/aa joint mob MET, cervical retraction/extension supine with overpressure, manual cervical traction    2. E Stim Unattended (CPT 65541), 15 min minutes, IFC with MHP cervical PS and upper trapis      Pain rating before treatment: 3  Pain rating after treatment: 1    ASSESSMENT:   Response to treatment:  Decreased stiffness post there ex.  Poor posture remains barrier to improvement.  Patient is demonstrating improved awarenss and good compliance to home program but continues to perform bench press and push ups.  Decreased scapthoracic mobility and strength.    PLAN/RECOMMENDATIONS:   Plan for treatment: therapy treatment to continue next visit.  Planned interventions for next visit: E-stim unattended (CPT 85361), manual therapy (CPT 43819), neuromuscular re-education (CPT 64191) and therapeutic exercise (CPT 01337).

## 2017-12-12 ENCOUNTER — APPOINTMENT (OUTPATIENT)
Dept: PHYSICAL THERAPY | Facility: REHABILITATION | Age: 47
End: 2017-12-12
Attending: NURSE PRACTITIONER
Payer: MEDICARE

## 2017-12-15 ENCOUNTER — OFFICE VISIT (OUTPATIENT)
Dept: URGENT CARE | Facility: CLINIC | Age: 47
End: 2017-12-15
Payer: MEDICARE

## 2017-12-15 VITALS
HEIGHT: 76 IN | DIASTOLIC BLOOD PRESSURE: 88 MMHG | BODY MASS INDEX: 31.78 KG/M2 | SYSTOLIC BLOOD PRESSURE: 126 MMHG | WEIGHT: 261 LBS | OXYGEN SATURATION: 99 % | HEART RATE: 104 BPM | TEMPERATURE: 98.9 F | RESPIRATION RATE: 16 BRPM

## 2017-12-15 DIAGNOSIS — R05.9 COUGH: ICD-10-CM

## 2017-12-15 DIAGNOSIS — R68.89 FLU-LIKE SYMPTOMS: ICD-10-CM

## 2017-12-15 LAB
FLUAV+FLUBV AG SPEC QL IA: NEGATIVE
INT CON NEG: NEGATIVE
INT CON POS: POSITIVE

## 2017-12-15 PROCEDURE — 87804 INFLUENZA ASSAY W/OPTIC: CPT | Performed by: NURSE PRACTITIONER

## 2017-12-15 PROCEDURE — 99204 OFFICE O/P NEW MOD 45 MIN: CPT | Performed by: NURSE PRACTITIONER

## 2017-12-15 RX ORDER — METHOCARBAMOL 500 MG/1
TABLET, FILM COATED ORAL
Refills: 0 | COMMUNITY
Start: 2017-11-13 | End: 2018-02-22

## 2017-12-15 RX ORDER — OMEPRAZOLE 20 MG/1
CAPSULE, DELAYED RELEASE ORAL
COMMUNITY
Start: 2017-12-11 | End: 2018-01-02 | Stop reason: SDUPTHER

## 2017-12-15 RX ORDER — TRETINOIN 0.5 MG/G
CREAM TOPICAL
COMMUNITY
Start: 2017-12-08 | End: 2018-02-22

## 2017-12-15 RX ORDER — BENZONATATE 100 MG/1
100 CAPSULE ORAL 3 TIMES DAILY PRN
Qty: 60 CAP | Refills: 0 | Status: SHIPPED | OUTPATIENT
Start: 2017-12-15 | End: 2018-02-22

## 2017-12-15 RX ORDER — ALPRAZOLAM 0.25 MG/1
TABLET ORAL
COMMUNITY
Start: 2017-12-11 | End: 2018-06-05

## 2017-12-15 RX ORDER — CODEINE PHOSPHATE AND GUAIFENESIN 10; 100 MG/5ML; MG/5ML
5-10 SOLUTION ORAL EVERY 6 HOURS PRN
Qty: 150 ML | Refills: 0 | Status: SHIPPED | OUTPATIENT
Start: 2017-12-15 | End: 2017-12-20

## 2017-12-15 RX ORDER — OSELTAMIVIR PHOSPHATE 75 MG/1
75 CAPSULE ORAL 2 TIMES DAILY
Qty: 10 CAP | Refills: 0 | Status: SHIPPED | OUTPATIENT
Start: 2017-12-15 | End: 2018-02-22

## 2017-12-15 RX ORDER — OLANZAPINE 7.5 MG/1
TABLET, FILM COATED ORAL
COMMUNITY
Start: 2017-12-11 | End: 2018-02-22

## 2017-12-16 NOTE — PROGRESS NOTES
Chief Complaint   Patient presents with   • Sinus Problem     x 3 days, nasal congestion, stuffy and runny nose, headaches   • Cough     x yesterday, nonproductive cough, chest congestion, shortness of breath, chills and fever       HISTORY OF PRESENT ILLNESS: Patient is a 47 y.o. male who presents today due to symptoms which started two days ago with sudden onset. Pt reports a fever, chills, body aches. Reports associated productive cough, sore throat, nasal congestion, headache, and fatigue. Denies blood in sputum, chest pain, shortness of breath, wheezing, nausea, vomiting, or diarrhea. Denies h/o asthma/copd/CAP. No immunocompromise. Has tried OTC cold/flu medications without significant relief of symptoms. No recent ABX use. No other aggravating or alleviating factors. He did not receive a flu vaccination this year.     Patient Active Problem List    Diagnosis Date Noted   • Poor vision 11/13/2017   • Lump in chest 10/30/2017   • Neck discomfort 10/30/2017   • Rash 10/05/2017   • Obesity (BMI 35.0-39.9 without comorbidity) ([facility]) 10/05/2017   • Hyperlipidemia, mild 08/07/2017   • Acne vulgaris 08/07/2017   • Routine health maintenance 06/22/2017   • Psychiatric disorder 06/22/2017   • Testosterone insufficiency 06/22/2017   • History of Lawrence's esophagus 06/22/2017   • Gastroesophageal reflux disease with esophagitis 06/22/2017   • Deviated nasal septum 06/22/2017   • Snores 06/22/2017       Allergies:Pcn [penicillins] and Sulfa drugs    Current Outpatient Prescriptions Ordered in [facility]   Medication Sig Dispense Refill   • alprazolam (XANAX) 0.25 MG Tab      • olanzapine (ZYPREXA) 7.5 MG tablet      • omeprazole (PRILOSEC) 20 MG delayed-release capsule      • tretinoin (RETIN-A) 0.05 % cream      • guaifenesin-codeine (ROBITUSSIN AC) Solution oral solution Take 5-10 mL by mouth every 6 hours as needed for up to 5 days. 150 mL 0   • omeprazole (PRILOSEC OTC) 20 MG tablet TAKE 1 CAPSULE BY MOUTH EVERY DAY 30 Tab  5   • triamcinolone acetonide (KENALOG) 0.1 % Cream Apply 1- 2 x a day as needed for rash to neck 1 Tube 2   • ANDROGEL PUMP 20.25 MG/ACT (1.62%) Gel 2 Sprays to arm 20 g 2   • fluoxetine (PROZAC) 10 MG Cap TK ONE C PO  QHS  0   • fluticasone (FLONASE) 50 MCG/ACT nasal spray SPRAYS 2 SPRAYS IN EACH NOSTRIL QD  0   • atorvastatin (LIPITOR) 10 MG Tab Take 1 Tab by mouth every day. 30 Tab 5   • tretinoin (RETIN-A) 0.1 % cream Apply very small amt to acne at night 45 g 0   • methocarbamol (ROBAXIN) 500 MG Tab TK 1 T PO HS PRN  0   • tizanidine (ZANAFLEX) 4 MG Tab Take 1 Tab by mouth at bedtime as needed. 30 Tab 5   • predniSONE (DELTASONE) 10 MG Tab Take 10 mg twice a day for 5 days, then once a day for five days. 15 Tab 0   • olanzapine (ZYPREXA) 5 MG Tab TK 1 T PO QD  0     No current [facility]-ordered facility-administered medications on file.        Past Medical History:   Diagnosis Date   • Anxiety    • Bipolar affective disorder (CMS-HCC)    • Depression    • GERD (gastroesophageal reflux disease)        Social History   Substance Use Topics   • Smoking status: Never Smoker   • Smokeless tobacco: Never Used   • Alcohol use No       Family Status   Relation Status   • Father    • Maternal Uncle    • Paternal Aunt    • Maternal Grandmother    • Maternal Grandfather    • Paternal Grandfather      Family History   Problem Relation Age of Onset   • Arthritis Father    • Heart Disease Maternal Uncle    • Psychiatry Paternal Aunt    • Heart Disease Maternal Grandmother    • Lung Disease Maternal Grandfather    • Cancer Maternal Grandfather    • Cancer Paternal Grandfather        ROS:  Review of Systems   Constitutional: Positive for subjective fever, chills, fatigue, malaise. Negative for weight loss.  HENT: Positive for congestion and sore throat. Negative for ear pain, nosebleeds, and neck pain.    Eyes: Negative for vision changes.   Cardiovascular: Negative for chest pain, palpitations, orthopnea and leg swelling.  "  Respiratory: Positive for cough with sputum production. Negative for shortness of breath and wheezing.   Gastrointestinal: Negative for abdominal pain, nausea, vomiting or diarrhea.   Skin: Negative for rash, diaphoresis.     Exam:  Blood pressure 126/88, pulse (!) 104, temperature 37.2 °C (98.9 °F), resp. rate 16, height 1.93 m (6' 3.98\"), weight 118.4 kg (261 lb), SpO2 99 %.  General: well-nourished, well-developed male, appears uncomfortable, non-toxic in appearance.   Head: normocephalic, atraumatic.  Eyes: PERRLA, EOM within normal limits, no conjunctival injection, no scleral icterus, visual fields and acuity grossly intact.  Ears: normal shape and symmetry, no tenderness, no discharge. External canals are without any significant edema or erythema. Tympanic membranes are without any inflammation, no effusion. Gross auditory acuity is intact.  Nose: symmetrical without tenderness, mild discharge, erythema present bilateral nares.  Mouth/Throat: reasonable hygiene, no exudates or tonsillar enlargement. Erythema present.   Neck: no masses, range of motion within normal limits, no tracheal deviation.  Lymph: mild cervical adenopathy. No supraclavicular adenopathy.   Neuro: alert and oriented. Cranial nerves 1-12 grossly intact.   Cardiovascular: tachycardic rate and regular rhythm without murmurs, rubs, or gallops. No edema.   Pulmonary: no distress. Chest is symmetrical with respiration, no wheezes, crackles, or rhonchi.   Musculoskeletal: appropriate muscle tone, gait is stable.  Skin: warm, dry, intact, no clubbing, no cyanosis.   Psych: appropriate mood, affect, judgement.         Assessment/Plan:  1. Flu-like symptoms  POCT Influenza A/B    oseltamivir (TAMIFLU) 75 MG Cap   2. Cough  guaifenesin-codeine (ROBITUSSIN AC) Solution oral solution    benzonatate (TESSALON) 100 MG Cap         POC flu negative          Discussed viral etiology, self limiting illness. Appears flu-like, Tamiflu offered. Did not see " any evidence of a bacterial process. Discussed natural progression and sx care. Rest, increase fluid intake, hand and respiratory hygiene. OTC cough/cold medications as directed. Tessalon for cough. Robitussin AC for night time cough, sedative effects of medication discussed with patient, do not take with other sedating medication.   Supportive care, differential diagnoses, and indications for immediate follow-up discussed with patient.   Pathogenesis of diagnosis discussed including typical length and natural progression.   Instructed to return to clinic or nearest emergency department for any change in condition, further concerns, or worsening of symptoms.  Patient states understanding of the plan of care and discharge instructions.  Instructed to make an appointment, for follow up, with their primary care provider.        CONI Newton.

## 2017-12-21 ENCOUNTER — PHYSICAL THERAPY (OUTPATIENT)
Dept: PHYSICAL THERAPY | Facility: REHABILITATION | Age: 47
End: 2017-12-21
Attending: NURSE PRACTITIONER
Payer: MEDICARE

## 2017-12-21 DIAGNOSIS — M54.2 NECK DISCOMFORT: ICD-10-CM

## 2017-12-21 PROCEDURE — 97014 ELECTRIC STIMULATION THERAPY: CPT

## 2017-12-21 PROCEDURE — 97140 MANUAL THERAPY 1/> REGIONS: CPT

## 2017-12-21 PROCEDURE — 97110 THERAPEUTIC EXERCISES: CPT

## 2017-12-21 NOTE — OP THERAPY DAILY TREATMENT
Outpatient Physical Therapy  DAILY TREATMENT     [facility] Physical [address]  [address]  [place] [address]  Phone:  [phone]  Fax:  [phone]    Date: 12/21/2017    Patient: Jared Rg  YOB: 1970  MRN: [id number]     Time Calculation  Start time: 0930  Stop time: 1015 Time Calculation (min): 45 minutes     Chief Complaint: No chief complaint on file.    Visit #: 4    SUBJECTIVE:  Slight improvement but symptoms still present right lateral cervical region, had flu over the past week which may have contributed to symptoms      OBJECTIVE:  Current objective measures:          Therapeutic Exercises (CPT 53004):     Total treatment time spent on Therapeutic Exercises: 20 minutes.      1. Repeated cervical retraction/extension seated, 1 x20    2. Posture correction    3. Cervical retraction/rotation, 1 x 15    4. Superman on ball 3 x 1 min    5. Half roller on wall, pec stretch, alt shoulder flexion    6. Prone ys and Ts single arm, 1 x 20 each bilateral UE    Therapeutic Treatments and Modalities:     1. Manual Therapy (CPT 53550), 15 min minutes, cervicalc PA c3-7, oa/aa joint mob MET, cervical retraction/extension supine with overpressure, manual cervical traction    2. E Stim Unattended (CPT 95846), 15 min minutes, IFC with MHP cervical PS and upper trapis      Pain rating before treatment: 2  Pain rating after treatment: 2    ASSESSMENT:   Response to treatment: poor posture in sitting    PLAN/RECOMMENDATIONS:   Plan for treatment: therapy treatment to continue next visit.  Planned interventions for next visit: E-stim unattended (CPT 19217), manual therapy (CPT 17995), neuromuscular re-education (CPT 07592) and therapeutic exercise (CPT 61362).

## 2017-12-26 ENCOUNTER — APPOINTMENT (OUTPATIENT)
Dept: PHYSICAL THERAPY | Facility: REHABILITATION | Age: 47
End: 2017-12-26
Attending: NURSE PRACTITIONER
Payer: MEDICARE

## 2017-12-28 ENCOUNTER — APPOINTMENT (OUTPATIENT)
Dept: PHYSICAL THERAPY | Facility: REHABILITATION | Age: 47
End: 2017-12-28
Attending: NURSE PRACTITIONER
Payer: MEDICARE

## 2018-01-02 ENCOUNTER — OFFICE VISIT (OUTPATIENT)
Dept: MEDICAL GROUP | Facility: MEDICAL CENTER | Age: 48
End: 2018-01-02
Attending: NURSE PRACTITIONER
Payer: MEDICARE

## 2018-01-02 VITALS
SYSTOLIC BLOOD PRESSURE: 110 MMHG | HEART RATE: 100 BPM | HEIGHT: 76 IN | TEMPERATURE: 98.1 F | WEIGHT: 266 LBS | BODY MASS INDEX: 32.39 KG/M2 | DIASTOLIC BLOOD PRESSURE: 70 MMHG | RESPIRATION RATE: 16 BRPM | OXYGEN SATURATION: 96 %

## 2018-01-02 DIAGNOSIS — K21.00 GASTROESOPHAGEAL REFLUX DISEASE WITH ESOPHAGITIS: ICD-10-CM

## 2018-01-02 DIAGNOSIS — R23.8 SKIN IRRITATION: ICD-10-CM

## 2018-01-02 DIAGNOSIS — E34.9 TESTOSTERONE INSUFFICIENCY: ICD-10-CM

## 2018-01-02 DIAGNOSIS — Z87.19 HISTORY OF BARRETT'S ESOPHAGUS: ICD-10-CM

## 2018-01-02 PROCEDURE — 99214 OFFICE O/P EST MOD 30 MIN: CPT | Performed by: NURSE PRACTITIONER

## 2018-01-02 RX ORDER — TESTOSTERONE 20.25 MG/1.25G
GEL TOPICAL
Qty: 75 G | Refills: 2 | Status: SHIPPED | OUTPATIENT
Start: 2018-01-02 | End: 2018-02-01 | Stop reason: SDUPTHER

## 2018-01-02 RX ORDER — OMEPRAZOLE 20 MG/1
20 CAPSULE, DELAYED RELEASE ORAL DAILY
Qty: 30 CAP | Refills: 3 | Status: SHIPPED | OUTPATIENT
Start: 2018-01-02 | End: 2018-05-02 | Stop reason: SDUPTHER

## 2018-01-02 RX ORDER — CLOTRIMAZOLE AND BETAMETHASONE DIPROPIONATE 10; .64 MG/G; MG/G
CREAM TOPICAL
Qty: 1 TUBE | Refills: 0 | Status: SHIPPED | OUTPATIENT
Start: 2018-01-02 | End: 2018-06-05

## 2018-01-02 ASSESSMENT — PAIN SCALES - GENERAL: PAINLEVEL: NO PAIN

## 2018-01-02 NOTE — PROGRESS NOTES
Jared presents to the clinic for skin irritation of face, med refills    His PMH includes  Acne  Anxiety,Depression, Bipolar Disorder  Testosterone Use/Low Testosterone  GERD  Lawrence's Esophagus hx  Hernia Repair  Hyperlipidemia, mild ( new)  Nasal Septum Deviation with Mucosa thickening ( new)  Sleep apnea on CPAP mask     Established with  Psychiatry- Isra Kate ( in Dr Champagne office)     Referrals Approved:  [facility] for Sleep Study  Dr Samayoa for deviated nasal septum ( seen 7/3/17)  [facility] consultants fo rGERd, Hx of Lawrence's Esophagus  Dermatology- [facility]        REview of REcords:  12/21/17 [facility] for slight improvement of right lateral neck pain.  12/15/17 Urgent Care for Probably Influenza [place] w Tamiflu, Robitussin w Codeine, Tessalon.  12/11/17 [facility] appt  11/13/17 Clinic for Results, RX Robaxin. Rx Testosterone  10/30/17 Clinic Visit for Left Chest Lump under left rib margin, Neck pain. Xray of C-spine ordered, Referral to PT, U/S chest and CXR ordered.  10/5/17 Clinic visit for f/u on Snoring, Rash---> Rx for Kenalog, RX Androgel, RE-refer GI  8/7/17 Clinic visit for Results, Acne concerns--> Refer Derm, RX Lipitor, Retin-A (1 x rx), Androgel RX  7/18/17 Referred to ENT-DR Crook based on CT MAx/Facial  6/22/17 New Patient visit here at Clinic. Labs ordered. Referred to Sleep Study, Referred to GI     [place]  Report shows  12/11/17 Xanax 0.25 mg # 90 by Isra Kate (Psychiatry)  12/09/17 Androgel 1.62 % gel pump 75 by me  11/1/17 Xanax 0.25 # 75 by Isra Kate  11/6/17 Androgel  1.62$      75 by me  10/12/17 Xanax 0.25 mg # 60 by Isra Kate (Psychiatry)  10/5/17 Androgel 1.62 % Gel pump 75 (20) days)  9/20/17 Androgel 1.62% Gel pump 75 ( 60 days) Me    Chief Complaint:    HPI:      Skin irritation/CPAP  Pt reports skin irritation to face where CPAP mask.  States nothing helps. Has tried Kenalog.  Discussed him contacting his Supplier  and discuss alternative  Mask materials.  Will try low dose Lotrisone as well.    Testosterone insufficiency  Pt reports he talked to his insurance would pay generic testosterone gel.  Hx of Testosterone low.    Gastroesophageal reflux disease with esophagitis  Pt reports needs refill of Omeprazole as it helps his GERD.  Also has hx of Lawrence's Esophagus and reports has appt  To see new GI MD this next week.        Patient Active Problem List    Diagnosis Date Noted   • Skin irritation 01/02/2018   • Poor vision 11/13/2017   • Lump in chest 10/30/2017   • Neck discomfort 10/30/2017   • Rash 10/05/2017   • Obesity (BMI 35.0-39.9 without comorbidity) ([facility]) 10/05/2017   • Hyperlipidemia, mild 08/07/2017   • Acne vulgaris 08/07/2017   • Routine health maintenance 06/22/2017   • Psychiatric disorder 06/22/2017   • Testosterone insufficiency 06/22/2017   • History of Lawrence's esophagus 06/22/2017   • Gastroesophageal reflux disease with esophagitis 06/22/2017   • Deviated nasal septum 06/22/2017   • Snores 06/22/2017       Allergies:Pcn [penicillins] and Sulfa drugs    Current Outpatient Prescriptions   Medication Sig Dispense Refill   • clotrimazole-betamethasone (LOTRISONE) 1-0.05 % Cream Apply sparingly to face rash 1 Tube 0   • Testosterone 20.25 MG/1.25GM (1.62%) Gel 2 1/2 pumps per day 75 g 2   • omeprazole (PRILOSEC) 20 MG delayed-release capsule Take 1 Cap by mouth every day. 30 Cap 3   • alprazolam (XANAX) 0.25 MG Tab      • methocarbamol (ROBAXIN) 500 MG Tab TK 1 T PO HS PRN  0   • olanzapine (ZYPREXA) 7.5 MG tablet      • tretinoin (RETIN-A) 0.05 % cream      • benzonatate (TESSALON) 100 MG Cap Take 1 Cap by mouth 3 times a day as needed for Cough. 60 Cap 0   • oseltamivir (TAMIFLU) 75 MG Cap Take 1 Cap by mouth 2 times a day. 10 Cap 0   • tizanidine (ZANAFLEX) 4 MG Tab Take 1 Tab by mouth at bedtime as needed. 30 Tab 5   • predniSONE (DELTASONE) 10 MG Tab Take 10 mg twice a day for 5 days, then once a day  "for five days. 15 Tab 0   • omeprazole (PRILOSEC OTC) 20 MG tablet TAKE 1 CAPSULE BY MOUTH EVERY DAY 30 Tab 5   • triamcinolone acetonide (KENALOG) 0.1 % Cream Apply 1- 2 x a day as needed for rash to neck 1 Tube 2   • ANDROGEL PUMP 20.25 MG/ACT (1.62%) Gel 2 Sprays to arm 20 g 2   • fluoxetine (PROZAC) 10 MG Cap TK ONE C PO  QHS  0   • olanzapine (ZYPREXA) 5 MG Tab TK 1 T PO QD  0   • fluticasone (FLONASE) 50 MCG/ACT nasal spray SPRAYS 2 SPRAYS IN EACH NOSTRIL QD  0   • atorvastatin (LIPITOR) 10 MG Tab Take 1 Tab by mouth every day. 30 Tab 5   • tretinoin (RETIN-A) 0.1 % cream Apply very small amt to acne at night 45 g 0     No current facility-administered medications for this visit.        Social History   Substance Use Topics   • Smoking status: Never Smoker   • Smokeless tobacco: Never Used   • Alcohol use No       Family History   Problem Relation Age of Onset   • Arthritis Father    • Heart Disease Maternal Uncle    • Psychiatry Paternal Aunt    • Heart Disease Maternal Grandmother    • Lung Disease Maternal Grandfather    • Cancer Maternal Grandfather    • Cancer Paternal Grandfather        ROS:  Review of Systems   See HPI Above    Exam:  Blood pressure 110/70, pulse 100, temperature 36.7 °C (98.1 °F), resp. rate 16, height 1.93 m (6' 3.98\"), weight 120.7 kg (266 lb), SpO2 96 %.  General:  Well nourished, well developed male in NAD  HENT:Head is grossly normal. PERRL. Slight redness in shape of form of CPAP mask on each side of face.skin intact.  Neck: Supple. Trachea is midline.  Pulmonary: Clear to ausculation .  Normal effort. No rales, ronchi, or wheezing.   Cardiovascular: Regular rate and rhythm.  Abdomen-Abdomen is soft, No tenderness.  Upper extremities- Strong = . Good ROM  Lower extremities- neg for edema, redness, tenderness.  Neuro- A & O x 4. Speech clear and appropriate.     Current medications, allergies, and problem list reviewed with patient and updated in  EPIC " today.    Assessment/Plan:  1. Skin irritation  clotrimazole-betamethasone (LOTRISONE) 1-0.05 % Cream   2. Testosterone insufficiency  Testosterone 20.25 MG/1.25GM (1.62%) Gel   3. History of Lawrence's esophagus  omeprazole (PRILOSEC) 20 MG delayed-release capsule   4. Gastroesophageal reflux disease with esophagitis  omeprazole (PRILOSEC) 20 MG delayed-release capsule   Follow up in 1 month for Testosterone refill. Consider 3 months worth. . Call or return if questions, concerns, or worsening condition.

## 2018-01-02 NOTE — ASSESSMENT & PLAN NOTE
Pt reports needs refill of Omeprazole as it helps his GERD.  Also has hx of Lawrence's Esophagus and reports has appt  To see new GI MD this next week.

## 2018-01-08 ENCOUNTER — PHYSICAL THERAPY (OUTPATIENT)
Dept: PHYSICAL THERAPY | Facility: REHABILITATION | Age: 48
End: 2018-01-08
Attending: NURSE PRACTITIONER
Payer: MEDICARE

## 2018-01-08 DIAGNOSIS — M54.2 NECK DISCOMFORT: ICD-10-CM

## 2018-01-08 PROCEDURE — 97140 MANUAL THERAPY 1/> REGIONS: CPT

## 2018-01-08 PROCEDURE — 97014 ELECTRIC STIMULATION THERAPY: CPT

## 2018-01-08 PROCEDURE — 97110 THERAPEUTIC EXERCISES: CPT

## 2018-01-08 NOTE — OP THERAPY DAILY TREATMENT
Outpatient Physical Therapy  DAILY TREATMENT     [facility] Physical [address].  [address]  [place] [address]  Phone:  [phone]  Fax:  [phone]    Date: 01/08/2018    Patient: Jared Rg  YOB: 1970  MRN: [id number]     Time Calculation  Start time: 1000          Chief Complaint: No chief complaint on file.    Visit #: 5    SUBJECTIVE:tiffness central cervical spine.  Has not been doing exercise secondary to recently having cyst removed on left deltoid region.        OBJECTIVE:  Current objective measures:          Therapeutic Exercises (CPT 23274):     Total treatment time spent on Therapeutic Exercises: 20 minutes.      1. Repeated cervical retraction/extension seated, 1 x20    2. Posture correction,  seated and standing maintaining cervical spine in nuetral    3. Cervical retraction/extension supine and seated, 1 x 15 each    5. Foam roller on floor cervical spine in nuetral, pec stretch, 2 x 30 sec, alt shoulder flexion/ horizontal abd 2 x 15 each    Therapeutic Treatments and Modalities:     1. Manual Therapy (CPT 76396), 15 min minutes, cervicalc PA c3-7, oa/aa joint mob MET, cervical retraction/extension supine with overpressure, manual cervical traction    2. E Stim Unattended (CPT 73076), 15 min minutes, IFC with MHP cervical PS and upper trapis      Pain rating before treatment: 3  Pain rating after treatment: 0    ASSESSMENT:   Response to treatment: full cervical rotation symptom free post mobilization, hypomobility C7-T1.  Patient has difficulty relaxing during cervical mobilization and maintaining neutral cervical spine with there ex.    PLAN/RECOMMENDATIONS:   Plan for treatment: therapy treatment to continue next visit.  Planned interventions for next visit: E-stim unattended (CPT 01790), manual therapy (CPT 69447), neuromuscular re-education (CPT 85938) and therapeutic exercise (CPT 24796).

## 2018-01-11 DIAGNOSIS — E78.5 HYPERLIPIDEMIA, MILD: ICD-10-CM

## 2018-01-11 RX ORDER — ATORVASTATIN CALCIUM 10 MG/1
TABLET, FILM COATED ORAL
Qty: 30 TAB | Refills: 2 | Status: SHIPPED | OUTPATIENT
Start: 2018-01-11 | End: 2018-04-01 | Stop reason: SDUPTHER

## 2018-01-11 NOTE — TELEPHONE ENCOUNTER
Was the patient seen in the last year in this department? Yes     Does patient have an active prescription for medications requested? No     Received Request Via: Pharmacy   Future Appointments       Provider Department [place]    1/15/2018 10:00 AM Kourtney Juarez PT, MSPT [facility] [address]    2/1/2018 9:10 AM [facility]

## 2018-01-15 ENCOUNTER — PHYSICAL THERAPY (OUTPATIENT)
Dept: PHYSICAL THERAPY | Facility: REHABILITATION | Age: 48
End: 2018-01-15
Attending: NURSE PRACTITIONER
Payer: MEDICARE

## 2018-01-15 DIAGNOSIS — M54.2 NECK DISCOMFORT: ICD-10-CM

## 2018-01-15 PROCEDURE — 97110 THERAPEUTIC EXERCISES: CPT

## 2018-01-15 PROCEDURE — 97140 MANUAL THERAPY 1/> REGIONS: CPT

## 2018-01-15 PROCEDURE — 97014 ELECTRIC STIMULATION THERAPY: CPT

## 2018-01-15 NOTE — OP THERAPY DAILY TREATMENT
Outpatient Physical Therapy  DAILY TREATMENT     [facility] Physical [address].  [address]  [place] [address]  Phone:  [phone]  Fax:  [phone]    Date: 01/15/2018    Patient: Jared Rg  YOB: 1970  MRN: [id number]     Time Calculation  Start time: 1000  Stop time: 1045 Time Calculation (min): 45 minutes     Chief Complaint: No chief complaint on file.    Visit #: 6    SUBJECTIVE:      OBJECTIVE:  Current objective measures:          Therapeutic Exercises (CPT 29033):     Total treatment time spent on Therapeutic Exercises: 20 minutes.      1. Repeated cervical retraction/extension seated, 1 x20    2. Posture correction,  seated and standing maintaining cervical spine in nuetral    3. Cervical retraction/extension supine and seated, 1 x 15 each    4. Upper thoracic extension seated, 1 x 20    5. Foam roller on floor cervical spine in nuetral, pec stretch, 2 x 30 sec, alt shoulder flexion/ horizontal abd 2 x 15 each    Therapeutic Treatments and Modalities:     1. Manual Therapy (CPT 10709), 15 min minutes, cervicalc PA c3-7, oa/aa joint mob MET, cervical retraction/extension supine with overpressure, manual cervical traction    2. E Stim Unattended (CPT 21602), 15 min minutes, IFC with MHP cervical PS and upper trapis      Pain rating before treatment: 3  Pain rating after treatment: 1    ASSESSMENT:   Response to treatment: hypomobility mid thoracic spine and C-T junction.  Patient noncompliant with home program which is contributing to slow progress    PLAN/RECOMMENDATIONS:   Plan for treatment: therapy treatment to continue next visit.  Planned interventions for next visit: continue with current treatment.  Progress home program scapthoracic mobility, cervical and thoracic extension, posture.  Patient states that his depression is a barrier to compliance.

## 2018-01-19 ENCOUNTER — NON-PROVIDER VISIT (OUTPATIENT)
Dept: OCCUPATIONAL MEDICINE | Facility: CLINIC | Age: 48
End: 2018-01-19

## 2018-01-19 DIAGNOSIS — Z23 NEED FOR VACCINATION: Primary | ICD-10-CM

## 2018-01-19 PROCEDURE — 90746 HEPB VACCINE 3 DOSE ADULT IM: CPT | Performed by: PREVENTIVE MEDICINE

## 2018-01-25 ENCOUNTER — APPOINTMENT (OUTPATIENT)
Dept: PHYSICAL THERAPY | Facility: REHABILITATION | Age: 48
End: 2018-01-25
Attending: NURSE PRACTITIONER
Payer: MEDICARE

## 2018-01-31 ENCOUNTER — PHYSICAL THERAPY (OUTPATIENT)
Dept: PHYSICAL THERAPY | Facility: REHABILITATION | Age: 48
End: 2018-01-31
Attending: NURSE PRACTITIONER
Payer: MEDICARE

## 2018-01-31 DIAGNOSIS — M54.2 NECK DISCOMFORT: ICD-10-CM

## 2018-01-31 PROCEDURE — 97014 ELECTRIC STIMULATION THERAPY: CPT

## 2018-01-31 PROCEDURE — 97110 THERAPEUTIC EXERCISES: CPT

## 2018-01-31 PROCEDURE — 97140 MANUAL THERAPY 1/> REGIONS: CPT

## 2018-01-31 NOTE — OP THERAPY DAILY TREATMENT
Outpatient Physical Therapy  DAILY TREATMENT     [facility] Physical [address].  [address]  [place] [address]  Phone:  [phone]  Fax:  [phone]    Date: 01/31/2018    Patient: Jared Rg  YOB: 1970  MRN: [id number]     Time Calculation             Chief Complaint: No chief complaint on file.    Visit #: 7    SUBJECTIVE: Got a foam roller; doing a little better    OBJECTIVE:  Current objective measures         Therapeutic Exercises (CPT 19493):     Total treatment time spent on Therapeutic Exercises: 20 minutes.      1. Repeated cervical retraction/extension seated, 1 x20    2. Posture correction,  seated and standing maintaining cervical spine in nuetral    3. Cervical retraction/extension supine and seated, 1 x 15 each    4. Upper thoracic extension seated, 1 x 20    5. Foam roller on floor cervical spine in nuetral, pec stretch, 2 x 30 sec, alt shoulder flexion/ horizontal abd 2 x 15 each    Therapeutic Treatments and Modalities:     1. Manual Therapy (CPT 33675), 15 min minutes, cervicalc PA c3-7, oa/aa joint mob MET, cervical retraction/extension supine with overpressure, manual cervical traction    2. E Stim Unattended (CPT 35688), 15 min minutes, IFC with MHP cervical PS and upper trapis      Pain rating before treatment: 1  Pain rating after treatment: 0    ASSESSMENT:   Response to treatment: hypomobile CT junction  Upper T-s but continues to improve.  Symptom free full AROM cervical spine post There ex and mobilization.      PLAN/RECOMMENDATIONS:   Plan for treatment: therapy treatment to continue next visit.  Planned interventions for next visit: E-stim unattended (CPT 76345), manual therapy (CPT 20592), neuromuscular re-education (CPT 37989) and therapeutic exercise (CPT 77256).  Progress upper T-spine extension cervical retraction with band overpressure for home program; scap thoracic strength

## 2018-02-01 ENCOUNTER — OFFICE VISIT (OUTPATIENT)
Dept: MEDICAL GROUP | Facility: MEDICAL CENTER | Age: 48
End: 2018-02-01
Attending: NURSE PRACTITIONER
Payer: MEDICARE

## 2018-02-01 VITALS
RESPIRATION RATE: 14 BRPM | WEIGHT: 271 LBS | HEART RATE: 88 BPM | HEIGHT: 75 IN | OXYGEN SATURATION: 97 % | DIASTOLIC BLOOD PRESSURE: 90 MMHG | BODY MASS INDEX: 33.69 KG/M2 | SYSTOLIC BLOOD PRESSURE: 130 MMHG | TEMPERATURE: 97.6 F

## 2018-02-01 DIAGNOSIS — E34.9 TESTOSTERONE INSUFFICIENCY: ICD-10-CM

## 2018-02-01 DIAGNOSIS — F99 PSYCHIATRIC DISORDER: ICD-10-CM

## 2018-02-01 DIAGNOSIS — M54.2 NECK DISCOMFORT: ICD-10-CM

## 2018-02-01 DIAGNOSIS — R06.02 SHORTNESS OF BREATH: ICD-10-CM

## 2018-02-01 PROCEDURE — 99213 OFFICE O/P EST LOW 20 MIN: CPT | Performed by: NURSE PRACTITIONER

## 2018-02-01 PROCEDURE — 99214 OFFICE O/P EST MOD 30 MIN: CPT | Performed by: NURSE PRACTITIONER

## 2018-02-01 RX ORDER — TESTOSTERONE 20.25 MG/1.25G
GEL TOPICAL
Qty: 75 G | Refills: 0 | Status: SHIPPED | OUTPATIENT
Start: 2018-02-01 | End: 2018-02-22

## 2018-02-01 RX ORDER — ALBUTEROL SULFATE 90 UG/1
2 AEROSOL, METERED RESPIRATORY (INHALATION) EVERY 6 HOURS PRN
Qty: 8.5 G | Refills: 2 | Status: SHIPPED | OUTPATIENT
Start: 2018-02-01 | End: 2018-11-19 | Stop reason: SDUPTHER

## 2018-02-01 RX ORDER — TESTOSTERONE 20.25 MG/1.25G
GEL TOPICAL
Qty: 75 G | Refills: 0 | Status: SHIPPED | OUTPATIENT
Start: 2018-03-02 | End: 2018-05-02 | Stop reason: SDUPTHER

## 2018-02-01 RX ORDER — TESTOSTERONE 20.25 MG/1.25G
GEL TOPICAL
Qty: 75 G | Refills: 0 | Status: SHIPPED | OUTPATIENT
Start: 2018-04-04 | End: 2018-02-22

## 2018-02-01 NOTE — PROGRESS NOTES
PMH:  Acne  Anxiety, Depression, Bipolar Disorder  Testosterone Use/Low Testosterone  GERD  Lawrence's Esophagus hx  Hernia Repair  Hyperlipidemia, mild ( new)  Nasal Septum Deviation with Mucosa thickening ( new)  Sleep apnea on CPAP mask  Chronic Neck Pain     Established with  Psychiatry- Isra Kate ( in Dr Champagne office)     Referrals Approved:  [facility] for Sleep Study  Dr Samayoa for deviated nasal septum ( seen 7/3/17)  GI consultants fo Juan, Hx of Lawrence's Esophagus  Dermatology- [facility]       REview of REcords:  1/15/18 Physical Therapy Appt for Neck pain  1/2/18 Clinic for Facial skin irritation, Med REfill, RX Lotrisone, Testosterone, Prilosec,  F/u 3 months  12/21/17 [facility] for slight improvement of right lateral neck pain.  12/15/17 Urgent Care for Probably Influenza TX w Tamiflu, Robitussin w Codeine, Tessalon.  12/11/17 [facility] appt  11/13/17 Clinic for Results, RX Robaxin. Rx Testosterone  10/30/17 Clinic Visit for Left Chest Lump under left rib margin, Neck pain. Xray of C-spine ordered, Referral to PT, U/S chest and CXR ordered.  10/5/17 Clinic visit for f/u on Snoring, Rash---> Rx for Kenalog, RX Androgel, RE-refer GI  8/7/17 Clinic visit for Results, Acne concerns--> Refer Derm, RX Lipitor, Retin-A (1 x rx), Androgel RX  7/18/17 Referred to ENT-DR Crook based on CT MAx/Facial  6/22/17 New Patient visit here at Clinic. Labs ordered. Referred to Sleep Study, Referred to ANNA Ryan  Report:  1/24/18 Androgel gel pump 1.62% 75 by me  1/9/18 Xanax 0.25 # 90 by Thomhenry Kate  24 RX and 4 Prescribers in REport  ----------------------------------------------------------------    Chief Complaint: Med REfills, SOB on exertion    HPI:      Testosterone insufficiency  Pt here for refill of Testosterone  REports NEEDS REFILL and doing well.  If Androgel not covered mY TRY aXIRON,  OR aNDRODERM    Psychiatric disorder  Pt has insomnia issues and  Bipolar tendencies.  He is followed by Psychiatry, Isra Kate.  Reports    Neck discomfort  Pt reports he has been attending Physical Therapy for his neck pain.  Reports doing better because of PT.     Shortness of breath  Pt repoprts sometimes in am gets SOB or walking dogs. Not sure if out of shape.  States rarely exercises. Non smoker. 2 yrs ago Cardiac Stress Test was negative.  Hx of MVP and wants Echo as in past was told to get f/u,.      Patient Active Problem List    Diagnosis Date Noted   • Shortness of breath 02/01/2018   • Skin irritation 01/02/2018   • Poor vision 11/13/2017   • Lump in chest 10/30/2017   • Neck discomfort 10/30/2017   • Rash 10/05/2017   • Obesity (BMI 35.0-39.9 without comorbidity) ([facility]) 10/05/2017   • Hyperlipidemia, mild 08/07/2017   • Acne vulgaris 08/07/2017   • Routine health maintenance 06/22/2017   • Psychiatric disorder 06/22/2017   • Testosterone insufficiency 06/22/2017   • History of Lawrence's esophagus 06/22/2017   • Gastroesophageal reflux disease with esophagitis 06/22/2017   • Deviated nasal septum 06/22/2017   • Snores 06/22/2017       Allergies:Pcn [penicillins] and Sulfa drugs    Current Outpatient Prescriptions   Medication Sig Dispense Refill   • Testosterone 20.25 MG/1.25GM (1.62%) Gel 2 1/2 pumps per day 75 g 0   • [START ON 3/2/2018] Testosterone 20.25 MG/1.25GM (1.62%) Gel 2 1/2 pumps per day 75 g 0   • [START ON 4/4/2018] Testosterone 20.25 MG/1.25GM (1.62%) Gel 2 1/2 pumps per day 75 g 0   • albuterol 108 (90 Base) MCG/ACT Aero Soln inhalation aerosol Inhale 2 Puffs by mouth every 6 hours as needed for Shortness of Breath. 8.5 g 2   • atorvastatin (LIPITOR) 10 MG Tab TAKE 1 TABLET BY MOUTH EVERY DAY 30 Tab 2   • clotrimazole-betamethasone (LOTRISONE) 1-0.05 % Cream Apply sparingly to face rash 1 Tube 0   • omeprazole (PRILOSEC) 20 MG delayed-release capsule Take 1 Cap by mouth every day. 30 Cap 3   • alprazolam (XANAX) 0.25 MG Tab      • methocarbamol  "(ROBAXIN) 500 MG Tab TK 1 T PO HS PRN  0   • olanzapine (ZYPREXA) 7.5 MG tablet      • tretinoin (RETIN-A) 0.05 % cream      • benzonatate (TESSALON) 100 MG Cap Take 1 Cap by mouth 3 times a day as needed for Cough. 60 Cap 0   • oseltamivir (TAMIFLU) 75 MG Cap Take 1 Cap by mouth 2 times a day. 10 Cap 0   • tizanidine (ZANAFLEX) 4 MG Tab Take 1 Tab by mouth at bedtime as needed. 30 Tab 5   • predniSONE (DELTASONE) 10 MG Tab Take 10 mg twice a day for 5 days, then once a day for five days. 15 Tab 0   • omeprazole (PRILOSEC OTC) 20 MG tablet TAKE 1 CAPSULE BY MOUTH EVERY DAY 30 Tab 5   • triamcinolone acetonide (KENALOG) 0.1 % Cream Apply 1- 2 x a day as needed for rash to neck 1 Tube 2   • ANDROGEL PUMP 20.25 MG/ACT (1.62%) Gel 2 Sprays to arm 20 g 2   • fluoxetine (PROZAC) 10 MG Cap TK ONE C PO  QHS  0   • olanzapine (ZYPREXA) 5 MG Tab TK 1 T PO QD  0   • fluticasone (FLONASE) 50 MCG/ACT nasal spray SPRAYS 2 SPRAYS IN EACH NOSTRIL QD  0   • tretinoin (RETIN-A) 0.1 % cream Apply very small amt to acne at night 45 g 0     No current facility-administered medications for this visit.        Social History   Substance Use Topics   • Smoking status: Never Smoker   • Smokeless tobacco: Never Used   • Alcohol use No       Family History   Problem Relation Age of Onset   • Arthritis Father    • Heart Disease Maternal Uncle    • Psychiatry Paternal Aunt    • Heart Disease Maternal Grandmother    • Lung Disease Maternal Grandfather    • Cancer Maternal Grandfather    • Cancer Paternal Grandfather        ROS:  Review of Systems   See HPI Above    Exam:  Blood pressure 130/90, pulse 88, temperature 36.4 °C (97.6 °F), resp. rate 14, height 1.905 m (6' 3\"), weight 122.9 kg (271 lb), SpO2 97 %.  General:  Well nourished, well developed male in slight anxious affect.  HENT:Head is grossly normal. PERRL.  Neck: Supple. Trachea is midline.  Pulmonary: Clear to ausculation .  Normal effort. No rales, ronchi, or wheezing. "   Cardiovascular: Regular rate and rhythm.  Abdomen-Abdomen is soft, No tenderness.  Upper extremities- Strong = . Good ROM  Lower extremities- neg for edema, redness, tenderness.  Neuro- A & O x 4. Speech clear and appropriate.     Current medications, allergies, and problem list reviewed with patient and updated in  EPIC today.    Assessment/Plan:  1. Testosterone insufficiency  Testosterone 20.25 MG/1.25GM (1.62%) Gel # 1    Testosterone 20.25 MG/1.25GM (1.62%) Gel # 2    Testosterone 20.25 MG/1.25GM (1.62%) Gel # 3   2. Psychiatric disorder  Continue meds and Psychiatry w Isra Kate.   3. Neck discomfort  Continue Physical Therapy   4. Shortness of breath  ECHOCARDIOGRAM COMP W/O CONT    DX-CHEST-2 VIEWS    albuterol 108 (90 Base) MCG/ACT Aero Soln inhalation aerosol   Follow up in 3 weeks. Call or return if questions, concerns, or worsening condition.

## 2018-02-01 NOTE — ASSESSMENT & PLAN NOTE
Pt here for refill of Testosterone  REports NEEDS REFILL and doing well.  If Androgel not covered mY TRY aXIRON,  OR aNDRODERM

## 2018-02-01 NOTE — ASSESSMENT & PLAN NOTE
Pt repoprts sometimes in am gets SOB or walking dogs. Not sure if out of shape.  States rarely exercises. Non smoker. 2 yrs ago Cardiac Stress Test was negative.  Hx of MVP and wants Echo as in past was told to get f/u,.

## 2018-02-05 ENCOUNTER — PHYSICAL THERAPY (OUTPATIENT)
Dept: PHYSICAL THERAPY | Facility: REHABILITATION | Age: 48
End: 2018-02-05
Attending: NURSE PRACTITIONER
Payer: MEDICARE

## 2018-02-05 DIAGNOSIS — M54.2 NECK DISCOMFORT: ICD-10-CM

## 2018-02-05 PROCEDURE — 97140 MANUAL THERAPY 1/> REGIONS: CPT

## 2018-02-05 PROCEDURE — 97014 ELECTRIC STIMULATION THERAPY: CPT

## 2018-02-05 PROCEDURE — 97110 THERAPEUTIC EXERCISES: CPT

## 2018-02-05 NOTE — OP THERAPY DAILY TREATMENT
Outpatient Physical Therapy  DAILY TREATMENT     [facility] Physical [address]  [address]  [place] [address]  Phone:  [phone]  Fax:  [phone]    Date: 02/05/2018    Patient: Jared Rg  YOB: 1970  MRN: [id number]     Time Calculation  Start time: 0800  Stop time: 0845 Time Calculation (min): 45 minutes     Chief Complaint: neck pain/stiffness  Visit #: 8    SUBJECTIVE:  Intermittent stiffness but improving  OBJECTIVE:  Current objective measures:         Therapeutic Exercises (CPT 25011):     Total treatment time spent on Therapeutic Exercises: 20 minutes.      1. Repeated cervical retraction/extension seated, 1 x20    2. Posture correction,  seated and standing maintaining cervical spine in nuetral    3. Cervical retraction/extension supine and seated, 1 x 15 each    4. Upper thoracic extension seated, 1 x 20    5. Foam roller on floor cervical spine in nuetral, pec stretch, 2 x 30 sec, alt shoulder flexion/ horizontal abd 2 x 15 each    6. Wall angels, 1 x20    7. Asa wings on roller, 1 x20    Therapeutic Treatments and Modalities:     1. Manual Therapy (CPT 38411), 15 min minutes, cervicalc PA c3-7, oa/aa joint mob MET, cervical retraction/extension supine with overpressure, manual cervical traction    2. E Stim Unattended (CPT 15748), 15 min minutes, IFC with MHP cervical PS and upper trapis      Pain rating before treatment: 0  Pain rating after treatment: 0    ASSESSMENT:   Response to treatment:  Hypomobility C5-C6, improved thoracic mobility, full cervical rotation.    PLAN/RECOMMENDATIONS:   Plan for treatment: therapy treatment to continue next visit.  Planned interventions for next visit: continue with current treatment  2 more visits with focus on C-T junction extension, scapulo thoracic strength/mobility home program progression.

## 2018-02-06 ENCOUNTER — HOSPITAL ENCOUNTER (OUTPATIENT)
Dept: CARDIOLOGY | Facility: MEDICAL CENTER | Age: 48
End: 2018-02-06
Attending: NURSE PRACTITIONER
Payer: MEDICARE

## 2018-02-06 DIAGNOSIS — R06.02 SHORTNESS OF BREATH: ICD-10-CM

## 2018-02-06 LAB
LV EJECT FRACT  99904: 65
LV EJECT FRACT MOD 2C 99903: 69.49
LV EJECT FRACT MOD 4C 99902: 64.07
LV EJECT FRACT MOD BP 99901: 64.78

## 2018-02-06 PROCEDURE — 93306 TTE W/DOPPLER COMPLETE: CPT | Mod: 26 | Performed by: INTERNAL MEDICINE

## 2018-02-06 PROCEDURE — 93306 TTE W/DOPPLER COMPLETE: CPT

## 2018-02-07 ENCOUNTER — HOSPITAL ENCOUNTER (EMERGENCY)
Dept: HOSPITAL 8 - ED | Age: 48
LOS: 2 days | Discharge: PSYCHIATRIC OR SUBSTANCE USE FACILITY | End: 2018-02-09
Payer: MEDICAID

## 2018-02-07 VITALS — WEIGHT: 268.96 LBS | HEIGHT: 75 IN | BODY MASS INDEX: 33.44 KG/M2

## 2018-02-07 DIAGNOSIS — F31.9: Primary | ICD-10-CM

## 2018-02-07 DIAGNOSIS — G47.33: ICD-10-CM

## 2018-02-07 LAB
ALBUMIN SERPL-MCNC: 3.5 G/DL (ref 3.4–5)
ANION GAP SERPL CALC-SCNC: 5 MMOL/L (ref 5–15)
APAP SERPL-MCNC: < 2 MCG/ML (ref 10–30)
BASOPHILS # BLD AUTO: 0.03 X10^3/UL (ref 0–0.1)
BASOPHILS NFR BLD AUTO: 0 % (ref 0–1)
CALCIUM SERPL-MCNC: 8.8 MG/DL (ref 8.5–10.1)
CHLORIDE SERPL-SCNC: 107 MMOL/L (ref 98–107)
CREAT SERPL-MCNC: 1.14 MG/DL (ref 0.7–1.3)
EOSINOPHIL # BLD AUTO: 0.12 X10^3/UL (ref 0–0.4)
EOSINOPHIL NFR BLD AUTO: 2 % (ref 1–7)
ERYTHROCYTE [DISTWIDTH] IN BLOOD BY AUTOMATED COUNT: 14.1 % (ref 9.4–14.8)
LYMPHOCYTES # BLD AUTO: 3.1 X10^3/UL (ref 1–3.4)
LYMPHOCYTES NFR BLD AUTO: 41 % (ref 22–44)
MCH RBC QN AUTO: 28.1 PG (ref 27.5–34.5)
MCHC RBC AUTO-ENTMCNC: 32.8 G/DL (ref 33.2–36.2)
MCV RBC AUTO: 85.5 FL (ref 81–97)
MD: NO
MONOCYTES # BLD AUTO: 0.82 X10^3/UL (ref 0.2–0.8)
MONOCYTES NFR BLD AUTO: 11 % (ref 2–9)
NEUTROPHILS # BLD AUTO: 3.48 X10^3/UL (ref 1.8–6.8)
NEUTROPHILS NFR BLD AUTO: 46 % (ref 42–75)
PLATELET # BLD AUTO: 282 X10^3/UL (ref 130–400)
PMV BLD AUTO: 7.6 FL (ref 7.4–10.4)
RBC # BLD AUTO: 6 X10^6/UL (ref 4.38–5.82)
VANCOMYCIN TROUGH SERPL-MCNC: < 1.7 MG/DL (ref 2.8–20)

## 2018-02-07 PROCEDURE — 85025 COMPLETE CBC W/AUTO DIFF WBC: CPT

## 2018-02-07 PROCEDURE — 82040 ASSAY OF SERUM ALBUMIN: CPT

## 2018-02-07 PROCEDURE — 84443 ASSAY THYROID STIM HORMONE: CPT

## 2018-02-07 PROCEDURE — 83735 ASSAY OF MAGNESIUM: CPT

## 2018-02-07 PROCEDURE — 80329 ANALGESICS NON-OPIOID 1 OR 2: CPT

## 2018-02-07 PROCEDURE — 80048 BASIC METABOLIC PNL TOTAL CA: CPT

## 2018-02-07 PROCEDURE — G0480 DRUG TEST DEF 1-7 CLASSES: HCPCS

## 2018-02-07 PROCEDURE — 99285 EMERGENCY DEPT VISIT HI MDM: CPT

## 2018-02-07 PROCEDURE — 36415 COLL VENOUS BLD VENIPUNCTURE: CPT

## 2018-02-07 PROCEDURE — 80307 DRUG TEST PRSMV CHEM ANLYZR: CPT

## 2018-02-07 PROCEDURE — 84439 ASSAY OF FREE THYROXINE: CPT

## 2018-02-07 PROCEDURE — G0378 HOSPITAL OBSERVATION PER HR: HCPCS

## 2018-02-08 LAB
T4 FREE SERPL-MCNC: 0.82 NG/DL (ref 0.76–1.46)
TSH SERPL-ACNC: 0.64 MIU/L (ref 0.36–3.74)

## 2018-02-08 RX ADMIN — OMEPRAZOLE SCH MG: 20 CAPSULE, DELAYED RELEASE ORAL at 08:47

## 2018-02-08 RX ADMIN — FLUOXETINE HYDROCHLORIDE SCH MG: 20 CAPSULE ORAL at 08:47

## 2018-02-09 VITALS — SYSTOLIC BLOOD PRESSURE: 138 MMHG | DIASTOLIC BLOOD PRESSURE: 83 MMHG

## 2018-02-09 RX ADMIN — FLUOXETINE HYDROCHLORIDE SCH MG: 20 CAPSULE ORAL at 09:18

## 2018-02-09 RX ADMIN — OMEPRAZOLE SCH MG: 20 CAPSULE, DELAYED RELEASE ORAL at 09:18

## 2018-02-13 ENCOUNTER — HOSPITAL ENCOUNTER (EMERGENCY)
Dept: HOSPITAL 8 - ED | Age: 48
Discharge: PSYCHIATRIC OR SUBSTANCE USE FACILITY | End: 2018-02-13
Payer: MEDICARE

## 2018-02-13 VITALS — DIASTOLIC BLOOD PRESSURE: 91 MMHG | SYSTOLIC BLOOD PRESSURE: 168 MMHG

## 2018-02-13 VITALS — BODY MASS INDEX: 33.44 KG/M2 | WEIGHT: 268.96 LBS | HEIGHT: 75 IN

## 2018-02-13 DIAGNOSIS — Z88.2: ICD-10-CM

## 2018-02-13 DIAGNOSIS — K22.70: ICD-10-CM

## 2018-02-13 DIAGNOSIS — F32.9: ICD-10-CM

## 2018-02-13 DIAGNOSIS — F41.9: ICD-10-CM

## 2018-02-13 DIAGNOSIS — Z88.0: ICD-10-CM

## 2018-02-13 DIAGNOSIS — R00.2: Primary | ICD-10-CM

## 2018-02-13 LAB
ANION GAP SERPL CALC-SCNC: 10 MMOL/L (ref 5–15)
APTT BLD: 29 SECONDS (ref 25–31)
BASOPHILS # BLD AUTO: 0.03 X10^3/UL (ref 0–0.1)
BASOPHILS NFR BLD AUTO: 0 % (ref 0–1)
CALCIUM SERPL-MCNC: 8.7 MG/DL (ref 8.5–10.1)
CHLORIDE SERPL-SCNC: 107 MMOL/L (ref 98–107)
CREAT SERPL-MCNC: 1.1 MG/DL (ref 0.7–1.3)
D DIMER PPP FEU-MCNC: 0.29 UG/MLFEU (ref 0–0.52)
EOSINOPHIL # BLD AUTO: 0.01 X10^3/UL (ref 0–0.4)
EOSINOPHIL NFR BLD AUTO: 0 % (ref 1–7)
ERYTHROCYTE [DISTWIDTH] IN BLOOD BY AUTOMATED COUNT: 13.9 % (ref 9.4–14.8)
INR PPP: 1.06 (ref 0.93–1.1)
LYMPHOCYTES # BLD AUTO: 1.12 X10^3/UL (ref 1–3.4)
LYMPHOCYTES NFR BLD AUTO: 11 % (ref 22–44)
MCH RBC QN AUTO: 27.9 PG (ref 27.5–34.5)
MCHC RBC AUTO-ENTMCNC: 33.5 G/DL (ref 33.2–36.2)
MCV RBC AUTO: 83.3 FL (ref 81–97)
MD: NO
MONOCYTES # BLD AUTO: 0.39 X10^3/UL (ref 0.2–0.8)
MONOCYTES NFR BLD AUTO: 4 % (ref 2–9)
NEUTROPHILS # BLD AUTO: 8.55 X10^3/UL (ref 1.8–6.8)
NEUTROPHILS NFR BLD AUTO: 85 % (ref 42–75)
PLATELET # BLD AUTO: 269 X10^3/UL (ref 130–400)
PMV BLD AUTO: 7.7 FL (ref 7.4–10.4)
PROTHROMBIN TIME: 10.9 SECONDS (ref 9.6–11.5)
RBC # BLD AUTO: 5.86 X10^6/UL (ref 4.38–5.82)

## 2018-02-13 PROCEDURE — 36415 COLL VENOUS BLD VENIPUNCTURE: CPT

## 2018-02-13 PROCEDURE — 85730 THROMBOPLASTIN TIME PARTIAL: CPT

## 2018-02-13 PROCEDURE — 80048 BASIC METABOLIC PNL TOTAL CA: CPT

## 2018-02-13 PROCEDURE — 83880 ASSAY OF NATRIURETIC PEPTIDE: CPT

## 2018-02-13 PROCEDURE — 85379 FIBRIN DEGRADATION QUANT: CPT

## 2018-02-13 PROCEDURE — 93005 ELECTROCARDIOGRAM TRACING: CPT

## 2018-02-13 PROCEDURE — 85610 PROTHROMBIN TIME: CPT

## 2018-02-13 PROCEDURE — 85025 COMPLETE CBC W/AUTO DIFF WBC: CPT

## 2018-02-13 PROCEDURE — 71046 X-RAY EXAM CHEST 2 VIEWS: CPT

## 2018-02-13 PROCEDURE — 99285 EMERGENCY DEPT VISIT HI MDM: CPT

## 2018-02-22 ENCOUNTER — OFFICE VISIT (OUTPATIENT)
Dept: MEDICAL GROUP | Facility: MEDICAL CENTER | Age: 48
End: 2018-02-22
Attending: NURSE PRACTITIONER
Payer: MEDICARE

## 2018-02-22 VITALS
RESPIRATION RATE: 16 BRPM | HEIGHT: 75 IN | SYSTOLIC BLOOD PRESSURE: 120 MMHG | BODY MASS INDEX: 31.83 KG/M2 | DIASTOLIC BLOOD PRESSURE: 80 MMHG | HEART RATE: 76 BPM | TEMPERATURE: 98 F | OXYGEN SATURATION: 96 % | WEIGHT: 256 LBS

## 2018-02-22 DIAGNOSIS — E34.9 TESTOSTERONE INSUFFICIENCY: ICD-10-CM

## 2018-02-22 DIAGNOSIS — F99 PSYCHIATRIC DISORDER: ICD-10-CM

## 2018-02-22 DIAGNOSIS — R06.02 SHORTNESS OF BREATH: ICD-10-CM

## 2018-02-22 PROCEDURE — 99214 OFFICE O/P EST MOD 30 MIN: CPT | Performed by: NURSE PRACTITIONER

## 2018-02-22 PROCEDURE — 99213 OFFICE O/P EST LOW 20 MIN: CPT | Performed by: NURSE PRACTITIONER

## 2018-02-22 NOTE — ASSESSMENT & PLAN NOTE
Patient reports he is taking his testosterone as prescribed.   He also reports he has refills.  Denies any concerns.

## 2018-02-22 NOTE — ASSESSMENT & PLAN NOTE
Pt reports exertional SOB. Denies cough fever or chills.  Denies history of asthma or COPD.  Reports in past had surgery for inward sternum.  Denies SOB now at rest.  We discussed it could be anxiety but want Pt to try Albuterol inhaler  That he has not used and will get pulmonary function test.  Non smoker    Echo results reviewed and basically normal.  Patient denies chest pain sweating or nausea and vomiting.

## 2018-02-22 NOTE — PROGRESS NOTES
Jared ledezma to the clinic for follow-up on shortness of breath.  His past medical history includes:  Acne  Anxiety, Depression, Bipolar Disorder  Testosterone Use/Low Testosterone  GERD  Lawrence's Esophagus hx  Hernia Repair  Hyperlipidemia, mild ( new)  Nasal Septum Deviation with Mucosa thickening ( new)  Sleep apnea on CPAP mask  Chronic Neck Pain     Established with  Psychiatry- Isra Kate ( in Dr Champagne office)     Referrals Approved:  [facility] for Sleep Study  Dr Samayoa for deviated nasal septum ( seen 7/3/17)  GI consultants fo rGERlv, Hx of Lawrence's Esophagus  Dermatology- Riley Cruz        REview of REcords:  2/1/18 clinic visit for medication refills, shortness of breath on exertion. Orders for echo and chest x-ray, refill of testosterone Rx,  Rx for albuterol.  1/15/18 Physical Therapy Appt for Neck pain  1/2/18 Clinic for Facial skin irritation, Med REfill, RX Lotrisone, Testosterone, Prilosec,  F/u 3 months  12/21/17 [facility] for slight improvement of right lateral neck pain.  12/15/17 Urgent Care for Probably Influenza [place] w Tamiflu, Robitussin w Codeine, Tessalon.  12/11/17 [facility] appt  11/13/17 Clinic for Results, RX Robaxin. Rx Testosterone  10/30/17 Clinic Visit for Left Chest Lump under left rib margin, Neck pain. Xray of C-spine ordered, Referral to PT, U/S chest and CXR ordered.  10/5/17 Clinic visit for f/u on Snoring, Rash---> Rx for Kenalog, RX Androgel, RE-refer GI  8/7/17 Clinic visit for Results, Acne concerns--> Refer Derm, RX Lipitor, Retin-A (1 x rx), Androgel RX  7/18/17 Referred to ENT-DR Crook based on CT MAx/Facial  6/22/17 New Patient visit here at Clinic. Labs ordered. Referred to Sleep Study, Referred to ANNA Ryan  Report:  1/24/18 Androgel gel pump 1.62% 75 by me  1/9/18 Xanax 0.25 # 90 by Thomax Krochalis  24 RX and 4 Prescribers in REport  ----------------------------------------------------------------    Results REview;  "  2/6/18 Echo: Normal echo with trace tricuspid regurgitation EF 65%.  CXr not done but 11/2/17 CXR normal    Chief Complaint: Results of Echo, f/u on SOB    HPI:      Shortness of breath  Pt reports exertional SOB. Denies cough fever or chills.  Denies history of asthma or COPD.  Reports in past had surgery for inward sternum.  Denies SOB now at rest.  We discussed it could be anxiety but want Pt to try Albuterol inhaler  That he has not used and will get pulmonary function test.  Non smoker    Echo results reviewed and basically normal.  Patient denies chest pain sweating or nausea and vomiting.    Psychiatric disorder  Pt reports recently in [place] for suicidal ideation.  Is sseeing Isra Solaresyoansherrie  And has appt next week  Reports was on Geodon for a week but made him \"palpitations\"  Denied suicidal ideation. Is taking Prozac and Xanax and stopped Geodon yesterday.    Testosterone insufficiency  Patient reports he is taking his testosterone as prescribed.   He also reports he has refills.  Denies any concerns.        Patient Active Problem List    Diagnosis Date Noted   • Shortness of breath 02/01/2018   • Skin irritation 01/02/2018   • Poor vision 11/13/2017   • Lump in chest 10/30/2017   • Neck discomfort 10/30/2017   • Rash 10/05/2017   • Obesity (BMI 35.0-39.9 without comorbidity) ([facility]) 10/05/2017   • Hyperlipidemia, mild 08/07/2017   • Acne vulgaris 08/07/2017   • Routine health maintenance 06/22/2017   • Psychiatric disorder 06/22/2017   • Testosterone insufficiency 06/22/2017   • History of Lawrence's esophagus 06/22/2017   • Gastroesophageal reflux disease with esophagitis 06/22/2017   • Deviated nasal septum 06/22/2017   • Snores 06/22/2017       Allergies:Pcn [penicillins] and Sulfa drugs    Current Outpatient Prescriptions   Medication Sig Dispense Refill   • albuterol 108 (90 Base) MCG/ACT Aero Soln inhalation aerosol Inhale 2 Puffs by mouth every 6 hours as needed for Shortness of Breath. 8.5 g 2 " "  • atorvastatin (LIPITOR) 10 MG Tab TAKE 1 TABLET BY MOUTH EVERY DAY 30 Tab 2   • clotrimazole-betamethasone (LOTRISONE) 1-0.05 % Cream Apply sparingly to face rash 1 Tube 0   • omeprazole (PRILOSEC) 20 MG delayed-release capsule Take 1 Cap by mouth every day. 30 Cap 3   • alprazolam (XANAX) 0.25 MG Tab      • tizanidine (ZANAFLEX) 4 MG Tab Take 1 Tab by mouth at bedtime as needed. 30 Tab 5   • triamcinolone acetonide (KENALOG) 0.1 % Cream Apply 1- 2 x a day as needed for rash to neck 1 Tube 2   • ANDROGEL PUMP 20.25 MG/ACT (1.62%) Gel 2 Sprays to arm 20 g 2   • fluoxetine (PROZAC) 10 MG Cap TK ONE C PO  QHS  0   • fluticasone (FLONASE) 50 MCG/ACT nasal spray SPRAYS 2 SPRAYS IN EACH NOSTRIL QD  0   • tretinoin (RETIN-A) 0.1 % cream Apply very small amt to acne at night 45 g 0   • [START ON 3/2/2018] Testosterone 20.25 MG/1.25GM (1.62%) Gel 2 1/2 pumps per day 75 g 0     No current facility-administered medications for this visit.        Social History   Substance Use Topics   • Smoking status: Never Smoker   • Smokeless tobacco: Never Used   • Alcohol use No       Family History   Problem Relation Age of Onset   • Arthritis Father    • Heart Disease Maternal Uncle    • Psychiatry Paternal Aunt    • Heart Disease Maternal Grandmother    • Lung Disease Maternal Grandfather    • Cancer Maternal Grandfather    • Cancer Paternal Grandfather        ROS:  Review of Systems   See HPI Above        Exam:  Blood pressure 120/80, pulse 76, temperature 36.7 °C (98 °F), resp. rate 16, height 1.905 m (6' 3\"), weight 116.1 kg (256 lb), SpO2 96 %.  General:  Well nourished, well developed male in NAD  HENT:Head is grossly normal. PERRL.  Neck: Supple. Trachea is midline.  Pulmonary: Clear to ausculation .  Normal effort. No rales, ronchi, or wheezing.   Cardiovascular: Regular rate and rhythm.  Abdomen-Abdomen is soft, No tenderness.  Upper extremities- Strong = . Good ROM  Lower extremities- neg for edema, redness, " tenderness.  Neuro- A & O x 4. Speech clear and appropriate.     Current medications, allergies, and problem list reviewed with patient and updated in  EPIC today.    Assessment/Plan:  1. Shortness of breath  PULMONARY FUNCTION TESTS Test requested: Complete Pulmonary Function Test   2. Psychiatric disorder  To keep appt w Psychiatry next week as planned. Continue Meds as discussed.  If suicidal ideation returns to go directly to ER.   3. Testosterone insufficiency  Continue Testosterone as prescribed.   Follow up in 6 weeks for f/u on PFT, SOB. Call or return if questions, concerns, or worsening condition.

## 2018-02-22 NOTE — ASSESSMENT & PLAN NOTE
"Pt reports recently in [place] for suicidal ideation.  Is sseeing Isra Kate  And has appt next week  Reports was on Geodon for a week but made him \"palpitations\"  Denied suicidal ideation. Is taking Prozac and Xanax and stopped Geodon yesterday.  "

## 2018-03-01 ENCOUNTER — OFFICE VISIT (OUTPATIENT)
Dept: URGENT CARE | Facility: CLINIC | Age: 48
End: 2018-03-01
Payer: MEDICARE

## 2018-03-01 VITALS
WEIGHT: 254.63 LBS | TEMPERATURE: 98.2 F | HEIGHT: 75 IN | BODY MASS INDEX: 31.66 KG/M2 | DIASTOLIC BLOOD PRESSURE: 80 MMHG | OXYGEN SATURATION: 98 % | HEART RATE: 85 BPM | SYSTOLIC BLOOD PRESSURE: 110 MMHG

## 2018-03-01 DIAGNOSIS — R53.83 OTHER FATIGUE: ICD-10-CM

## 2018-03-01 DIAGNOSIS — J02.9 PHARYNGITIS, UNSPECIFIED ETIOLOGY: ICD-10-CM

## 2018-03-01 DIAGNOSIS — R68.83 CHILLS: ICD-10-CM

## 2018-03-01 DIAGNOSIS — R11.0 NAUSEA: ICD-10-CM

## 2018-03-01 LAB
FLUAV+FLUBV AG SPEC QL IA: NEGATIVE
INT CON NEG: NORMAL
INT CON NEG: NORMAL
INT CON POS: NORMAL
INT CON POS: NORMAL
S PYO AG THROAT QL: NEGATIVE

## 2018-03-01 PROCEDURE — 87804 INFLUENZA ASSAY W/OPTIC: CPT | Performed by: FAMILY MEDICINE

## 2018-03-01 PROCEDURE — 99214 OFFICE O/P EST MOD 30 MIN: CPT | Performed by: FAMILY MEDICINE

## 2018-03-01 PROCEDURE — 87880 STREP A ASSAY W/OPTIC: CPT | Performed by: FAMILY MEDICINE

## 2018-03-01 RX ORDER — DIVALPROEX SODIUM 250 MG/1
250 TABLET, DELAYED RELEASE ORAL 2 TIMES DAILY
COMMUNITY
End: 2018-10-24

## 2018-03-01 RX ORDER — ONDANSETRON 4 MG/1
4 TABLET, ORALLY DISINTEGRATING ORAL EVERY 8 HOURS PRN
Qty: 6 TAB | Refills: 0 | Status: SHIPPED | OUTPATIENT
Start: 2018-03-01 | End: 2018-06-05

## 2018-03-02 ENCOUNTER — OFFICE VISIT (OUTPATIENT)
Dept: MEDICAL GROUP | Facility: MEDICAL CENTER | Age: 48
End: 2018-03-02
Attending: NURSE PRACTITIONER
Payer: MEDICARE

## 2018-03-02 VITALS
DIASTOLIC BLOOD PRESSURE: 88 MMHG | RESPIRATION RATE: 16 BRPM | WEIGHT: 253.97 LBS | TEMPERATURE: 98.1 F | HEART RATE: 100 BPM | HEIGHT: 75 IN | BODY MASS INDEX: 31.58 KG/M2 | SYSTOLIC BLOOD PRESSURE: 120 MMHG | OXYGEN SATURATION: 92 %

## 2018-03-02 DIAGNOSIS — J06.9 VIRAL URI: ICD-10-CM

## 2018-03-02 PROCEDURE — 99213 OFFICE O/P EST LOW 20 MIN: CPT | Performed by: INTERNAL MEDICINE

## 2018-03-02 PROCEDURE — 99212 OFFICE O/P EST SF 10 MIN: CPT | Performed by: NURSE PRACTITIONER

## 2018-03-02 RX ORDER — BENZONATATE 100 MG/1
100 CAPSULE ORAL 3 TIMES DAILY PRN
Qty: 30 CAP | Refills: 0 | Status: SHIPPED | OUTPATIENT
Start: 2018-03-02 | End: 2018-06-05

## 2018-03-02 ASSESSMENT — PAIN SCALES - GENERAL: PAINLEVEL: 4=SLIGHT-MODERATE PAIN

## 2018-03-02 NOTE — PROGRESS NOTES
"Subjective:      Jared Rg is a 47 y.o. male who presents with Cough (sore throat,mild sob,chills this morning x1day )            2 days ST, mild cough, chills, generalized weakness. +SOB. No wheeze. No PMH asthma. No HA. +fatigue. Recently had change in psychiatric medications. No other aggravating or alleviating factors.          Review of Systems   Constitutional: Negative for weight loss.   HENT: Negative for ear discharge and ear pain.    Eyes: Negative for discharge and redness.   Respiratory: Negative for hemoptysis.    Cardiovascular: Negative for leg swelling.   Skin: Negative for itching and rash.   Psychiatric/Behavioral: Negative for substance abuse. The patient is nervous/anxious.           Objective:     /80   Pulse 85   Temp 36.8 °C (98.2 °F)   Ht 1.905 m (6' 3\")   Wt 115.5 kg (254 lb 10.1 oz)   SpO2 98%   BMI 31.83 kg/m²      Physical Exam   Constitutional: He appears well-developed and well-nourished. No distress.   HENT:   Head: Normocephalic and atraumatic.   Nasal congestion  Pharynx red without exudate     Eyes: Conjunctivae are normal.   Neck: Neck supple.   Cardiovascular: Normal rate, regular rhythm and normal heart sounds.    Pulmonary/Chest: Effort normal and breath sounds normal. He has no wheezes.   Lymphadenopathy:     He has no cervical adenopathy.   Neurological:   Speech is clear. Patient is appropriate and cooperative.     Skin: Skin is warm and dry. No rash noted.               Assessment/Plan:   Strep negative  Influenza negative    1. Pharyngitis, unspecified etiology  POCT Rapid Strep A    POCT Influenza A/B   2. Chills  POCT Rapid Strep A    POCT Influenza A/B   3. Other fatigue  POCT Rapid Strep A    POCT Influenza A/B    CBC WITH DIFFERENTIAL    BASIC METABOLIC PANEL   4. Nausea  ondansetron (ZOFRAN ODT) 4 MG TABLET DISPERSIBLE     Differential diagnosis, natural history, supportive care, and indications for immediate follow-up discussed at length. "   F/u labs

## 2018-03-02 NOTE — ASSESSMENT & PLAN NOTE
Patient presents today with a 2 day history of sore throat with mild cough and chest congestion. He denies fevers, chills, headache, ear pain. He was seen by urgent care yesterday and prescribed Zofran because he was having some nausea. Otherwise, no testing was done and he was not given any antibiotics. He states that in the past he has been told he had enlarged tonsils and his ear nose and throat doctor has treated him with antibiotics before which have helped. He denies history of recurrent strep throat. He denies any sick contacts.

## 2018-03-02 NOTE — PROGRESS NOTES
Subjective:   Jared Rg is a 47 y.o. male here today for cough and sore throat for 2 days    Viral URI  Patient presents today with a 2 day history of sore throat with mild cough and chest congestion. He denies fevers, chills, headache, ear pain. He was seen by urgent care yesterday and prescribed Zofran because he was having some nausea. Otherwise, no testing was done and he was not given any antibiotics. He states that in the past he has been told he had enlarged tonsils and his ear nose and throat doctor has treated him with antibiotics before which have helped. He denies history of recurrent strep throat. He denies any sick contacts.        Current medicines (including changes today)  Current Outpatient Prescriptions   Medication Sig Dispense Refill   • benzonatate (TESSALON) 100 MG Cap Take 1 Cap by mouth 3 times a day as needed. 30 Cap 0   • divalproex (DEPAKOTE) 250 MG Tablet Delayed Response Take 250 mg by mouth 3 times a day.     • albuterol 108 (90 Base) MCG/ACT Aero Soln inhalation aerosol Inhale 2 Puffs by mouth every 6 hours as needed for Shortness of Breath. 8.5 g 2   • atorvastatin (LIPITOR) 10 MG Tab TAKE 1 TABLET BY MOUTH EVERY DAY 30 Tab 2   • clotrimazole-betamethasone (LOTRISONE) 1-0.05 % Cream Apply sparingly to face rash 1 Tube 0   • omeprazole (PRILOSEC) 20 MG delayed-release capsule Take 1 Cap by mouth every day. 30 Cap 3   • alprazolam (XANAX) 0.25 MG Tab      • triamcinolone acetonide (KENALOG) 0.1 % Cream Apply 1- 2 x a day as needed for rash to neck 1 Tube 2   • ANDROGEL PUMP 20.25 MG/ACT (1.62%) Gel 2 Sprays to arm 20 g 2   • fluoxetine (PROZAC) 10 MG Cap TK ONE C PO  QHS  0   • fluticasone (FLONASE) 50 MCG/ACT nasal spray SPRAYS 2 SPRAYS IN EACH NOSTRIL QD  0   • tretinoin (RETIN-A) 0.1 % cream Apply very small amt to acne at night 45 g 0   • ondansetron (ZOFRAN ODT) 4 MG TABLET DISPERSIBLE Take 1 Tab by mouth every 8 hours as needed. 6 Tab 0   • Testosterone 20.25  "MG/1.25GM (1.62%) Gel 2 1/2 pumps per day 75 g 0   • tizanidine (ZANAFLEX) 4 MG Tab Take 1 Tab by mouth at bedtime as needed. 30 Tab 5     No current facility-administered medications for this visit.      He  has a past medical history of Anxiety; Bipolar affective disorder (CMS-[facility]); Depression; and GERD (gastroesophageal reflux disease).    ROS   As above in HPI  Denies chest pain and shortness of breath     Objective:     Blood pressure 120/88, pulse 100, temperature 36.7 °C (98.1 °F), resp. rate 16, height 1.905 m (6' 3\"), weight 115.2 kg (253 lb 15.5 oz), SpO2 92 %. Body mass index is 31.74 kg/m².   Physical Exam:  Constitutional: Alert, no distress.  Skin: Warm, dry, good turgor, no rashes in visible areas.  Eye: Equal, round and reactive, conjunctiva clear, lids normal.  ENMT: Lips without lesions, oropharynx mildly injected, TM's clear bilaterally, turbinates not swollen  Neck: Trachea midline, no masses, no thyromegaly. Small cervical lymph node on left which is mildly tender to palpation  Respiratory: Unlabored respiratory effort, lungs clear to auscultation, no wheezes, no ronchi.  Cardiovascular: Regular rate and rhythm, no murmurs appreciated      Assessment and Plan:   The following treatment plan was discussed    1. Viral URI  We discussed the patient's symptoms are consistent with a viral pharyngitis. I recommended over-the-counter Robitussin-DM or Mucinex. I have given him some Tessalon Perles to take for cough. We discussed adequate hydration, rest, ibuprofen as needed for throat pain. He will let us know if no better in one week from now.  - benzonatate (TESSALON) 100 MG Cap; Take 1 Cap by mouth 3 times a day as needed.  Dispense: 30 Cap; Refill: 0  -OTC cold medications as needed        Followup: Return if symptoms worsen or fail to improve.         "

## 2018-03-03 ENCOUNTER — OFFICE VISIT (OUTPATIENT)
Dept: URGENT CARE | Facility: CLINIC | Age: 48
End: 2018-03-03
Payer: MEDICARE

## 2018-03-03 VITALS
HEART RATE: 88 BPM | OXYGEN SATURATION: 97 % | WEIGHT: 255.73 LBS | TEMPERATURE: 98.3 F | SYSTOLIC BLOOD PRESSURE: 120 MMHG | HEIGHT: 75 IN | BODY MASS INDEX: 31.8 KG/M2 | DIASTOLIC BLOOD PRESSURE: 74 MMHG

## 2018-03-03 DIAGNOSIS — R68.89 FLU-LIKE SYMPTOMS: ICD-10-CM

## 2018-03-03 LAB
FLUAV+FLUBV AG SPEC QL IA: NEGATIVE
INT CON NEG: NORMAL
INT CON POS: NORMAL

## 2018-03-03 PROCEDURE — 99214 OFFICE O/P EST MOD 30 MIN: CPT | Performed by: PHYSICIAN ASSISTANT

## 2018-03-03 PROCEDURE — 87804 INFLUENZA ASSAY W/OPTIC: CPT | Performed by: PHYSICIAN ASSISTANT

## 2018-03-03 RX ORDER — OSELTAMIVIR PHOSPHATE 75 MG/1
75 CAPSULE ORAL 2 TIMES DAILY
Qty: 10 CAP | Refills: 0 | Status: SHIPPED | OUTPATIENT
Start: 2018-03-03 | End: 2018-03-08

## 2018-03-03 ASSESSMENT — ENCOUNTER SYMPTOMS
CHANGE IN BOWEL HABIT: 0
MYALGIAS: 1
COUGH: 0
WEAKNESS: 1
EYES NEGATIVE: 1
SORE THROAT: 0
ABDOMINAL PAIN: 0
FEVER: 0

## 2018-03-04 NOTE — PROGRESS NOTES
"Subjective:      Jared Rg is a 47 y.o. male who presents with Weakness (with fever, cough, shivers x4 days)            Other   This is a new (weakness, fever, aches) problem. The current episode started today. The problem occurs constantly. The problem has been unchanged. Associated symptoms include myalgias and weakness. Pertinent negatives include no abdominal pain, change in bowel habit, coughing, fever or sore throat. Nothing aggravates the symptoms. He has tried nothing for the symptoms. The treatment provided no relief.       Review of Systems   Constitutional: Positive for malaise/fatigue. Negative for fever.   HENT: Negative.  Negative for sore throat.    Eyes: Negative.    Respiratory: Negative for cough.    Gastrointestinal: Negative for abdominal pain and change in bowel habit.   Musculoskeletal: Positive for myalgias.   Skin: Negative.    Neurological: Positive for weakness.          Objective:     /74   Pulse 88   Temp 36.8 °C (98.3 °F)   Ht 1.905 m (6' 3\")   Wt 116 kg (255 lb 11.7 oz)   SpO2 97%   BMI 31.96 kg/m²      Physical Exam   Constitutional: He is oriented to person, place, and time. He appears well-developed and well-nourished. No distress.   HENT:   Head: Normocephalic and atraumatic.   Eyes: EOM are normal. Pupils are equal, round, and reactive to light.   Neck: Normal range of motion. Neck supple.   Cardiovascular: Normal rate.    Pulmonary/Chest: Effort normal and breath sounds normal. No respiratory distress.   Neurological: He is alert and oriented to person, place, and time.   Skin: Skin is warm and dry.   Psychiatric: He has a normal mood and affect. His behavior is normal.   Nursing note and vitals reviewed.    Vitals:    03/03/18 1542   BP: 120/74   Pulse: 88   Temp: 36.8 °C (98.3 °F)   SpO2: 97%   Weight: 116 kg (255 lb 11.7 oz)   Height: 1.905 m (6' 3\")     Active Ambulatory Problems     Diagnosis Date Noted   • Routine health maintenance 06/22/2017   • " Psychiatric disorder 06/22/2017   • Testosterone insufficiency 06/22/2017   • History of Lawrence's esophagus 06/22/2017   • Gastroesophageal reflux disease with esophagitis 06/22/2017   • Deviated nasal septum 06/22/2017   • Snores 06/22/2017   • Hyperlipidemia, mild 08/07/2017   • Acne vulgaris 08/07/2017   • Rash 10/05/2017   • Obesity (BMI 35.0-39.9 without comorbidity) ([facility]) 10/05/2017   • Lump in chest 10/30/2017   • Neck discomfort 10/30/2017   • Poor vision 11/13/2017   • Skin irritation 01/02/2018   • Shortness of breath 02/01/2018   • Viral URI 03/02/2018     Resolved Ambulatory Problems     Diagnosis Date Noted   • No Resolved Ambulatory Problems     Past Medical History:   Diagnosis Date   • Anxiety    • Bipolar affective disorder (CMS-HCC)    • Depression    • GERD (gastroesophageal reflux disease)      Current Outpatient Prescriptions on File Prior to Visit   Medication Sig Dispense Refill   • benzonatate (TESSALON) 100 MG Cap Take 1 Cap by mouth 3 times a day as needed. 30 Cap 0   • divalproex (DEPAKOTE) 250 MG Tablet Delayed Response Take 250 mg by mouth 3 times a day.     • ondansetron (ZOFRAN ODT) 4 MG TABLET DISPERSIBLE Take 1 Tab by mouth every 8 hours as needed. 6 Tab 0   • Testosterone 20.25 MG/1.25GM (1.62%) Gel 2 1/2 pumps per day 75 g 0   • albuterol 108 (90 Base) MCG/ACT Aero Soln inhalation aerosol Inhale 2 Puffs by mouth every 6 hours as needed for Shortness of Breath. 8.5 g 2   • atorvastatin (LIPITOR) 10 MG Tab TAKE 1 TABLET BY MOUTH EVERY DAY 30 Tab 2   • clotrimazole-betamethasone (LOTRISONE) 1-0.05 % Cream Apply sparingly to face rash 1 Tube 0   • omeprazole (PRILOSEC) 20 MG delayed-release capsule Take 1 Cap by mouth every day. 30 Cap 3   • alprazolam (XANAX) 0.25 MG Tab      • tizanidine (ZANAFLEX) 4 MG Tab Take 1 Tab by mouth at bedtime as needed. 30 Tab 5   • triamcinolone acetonide (KENALOG) 0.1 % Cream Apply 1- 2 x a day as needed for rash to neck 1 Tube 2   • ANDROGEL PUMP  20.25 MG/ACT (1.62%) Gel 2 Sprays to arm 20 g 2   • fluoxetine (PROZAC) 10 MG Cap TK ONE C PO  QHS  0   • fluticasone (FLONASE) 50 MCG/ACT nasal spray SPRAYS 2 SPRAYS IN EACH NOSTRIL QD  0   • tretinoin (RETIN-A) 0.1 % cream Apply very small amt to acne at night 45 g 0     No current facility-administered medications on file prior to visit.      Social History     Social History   • Marital status: Single     Spouse name: N/A   • Number of children: N/A   • Years of education: N/A     Occupational History   • Not on file.     Social History Main Topics   • Smoking status: Never Smoker   • Smokeless tobacco: Never Used   • Alcohol use No   • Drug use: No   • Sexual activity: Yes     Other Topics Concern   • Not on file     Social History Narrative   • No narrative on file     Family History   Problem Relation Age of Onset   • Arthritis Father    • Heart Disease Maternal Uncle    • Psychiatry Paternal Aunt    • Heart Disease Maternal Grandmother    • Lung Disease Maternal Grandfather    • Cancer Maternal Grandfather    • Cancer Paternal Grandfather      Pcn [penicillins] and Sulfa drugs         Flu ng     Assessment/Plan:     ·  r/o flu sx      · tamiflu rx; otc prn  · If cough persists, rtc or call

## 2018-03-05 ENCOUNTER — OFFICE VISIT (OUTPATIENT)
Dept: URGENT CARE | Facility: CLINIC | Age: 48
End: 2018-03-05
Payer: MEDICARE

## 2018-03-05 VITALS
SYSTOLIC BLOOD PRESSURE: 116 MMHG | BODY MASS INDEX: 31.33 KG/M2 | DIASTOLIC BLOOD PRESSURE: 80 MMHG | HEART RATE: 86 BPM | OXYGEN SATURATION: 96 % | WEIGHT: 251.99 LBS | TEMPERATURE: 98.2 F | HEIGHT: 75 IN | RESPIRATION RATE: 16 BRPM

## 2018-03-05 DIAGNOSIS — J06.9 VIRAL URI WITH COUGH: ICD-10-CM

## 2018-03-05 PROCEDURE — 99214 OFFICE O/P EST MOD 30 MIN: CPT | Performed by: PHYSICIAN ASSISTANT

## 2018-03-05 NOTE — LETTER
March 5, 2018         Patient: Jared Rg   YOB: 1970   Date of Visit: 3/5/2018           To Whom it May Concern:    Jared Rg was seen in my clinic on 3/5/2018.  Please excuse his absence from 3/5/18-3/7/18    If you have any questions or concerns, please don't hesitate to call.        Sincerely,           Twila Landa P.A.-C.  Electronically Signed

## 2018-03-06 ASSESSMENT — COPD QUESTIONNAIRES: COPD: 0

## 2018-03-06 ASSESSMENT — ENCOUNTER SYMPTOMS
DIARRHEA: 0
VOMITING: 0
WHEEZING: 1
WEIGHT LOSS: 0
ABDOMINAL PAIN: 0
NAUSEA: 0
SORE THROAT: 1
SHORTNESS OF BREATH: 0
HEADACHES: 0
SPUTUM PRODUCTION: 0
DIZZINESS: 0
CHILLS: 1
MYALGIAS: 1
FEVER: 0
COUGH: 1
SWEATS: 0

## 2018-03-06 NOTE — PROGRESS NOTES
"Subjective:      Jared Rg is a 47 y.o. male who presents with Cough and Shortness of Breath        Patient is accompanied by his wife.     Cough   This is a new problem. The current episode started in the past 7 days (4 days). The problem has been unchanged. The problem occurs every few minutes. The cough is non-productive. Associated symptoms include chills, myalgias, a sore throat and wheezing. Pertinent negatives include no chest pain, ear pain, fever, headaches, shortness of breath, sweats or weight loss. Treatments tried: tamiflu. The treatment provided mild relief. There is no history of asthma, COPD or pneumonia.     Patient has been see in urgent care 3 previous times over the past 4 days for the same problem. His initial symptoms consisted of fevers, chills, body aches, cough, sore throat, and congestion. He reports resolution of most symptoms, but does have a persistent cough especially at night. He tested negative for both strep and influenza and has been told his symptoms are viral in etiology. He was started on Tamiflu 2 days ago but reports a consistent cough. He was also given tessalon perles as a previous visit but has not tried taking them for his cough.     Review of Systems   Constitutional: Positive for chills. Negative for fever and weight loss.   HENT: Positive for sore throat. Negative for congestion and ear pain.    Respiratory: Positive for cough and wheezing. Negative for sputum production and shortness of breath.    Cardiovascular: Negative for chest pain.   Gastrointestinal: Negative for abdominal pain, diarrhea, nausea and vomiting.   Genitourinary: Negative.    Musculoskeletal: Positive for myalgias.   Neurological: Negative for dizziness and headaches.      Objective:     /80   Pulse 86   Temp 36.8 °C (98.2 °F)   Resp 16   Ht 1.905 m (6' 3\")   Wt 114.3 kg (251 lb 15.8 oz)   SpO2 96%   BMI 31.50 kg/m²      Physical Exam   Constitutional: He is oriented to " person, place, and time. He appears well-developed and well-nourished. No distress.   HENT:   Head: Normocephalic and atraumatic.   Right Ear: Hearing, tympanic membrane, external ear and ear canal normal.   Left Ear: Hearing, tympanic membrane, external ear and ear canal normal.   Nose: Nose normal. Right sinus exhibits no maxillary sinus tenderness and no frontal sinus tenderness. Left sinus exhibits no maxillary sinus tenderness and no frontal sinus tenderness.   Mouth/Throat: Oropharynx is clear and moist. No oropharyngeal exudate, posterior oropharyngeal edema or posterior oropharyngeal erythema.   Eyes: Conjunctivae are normal. Pupils are equal, round, and reactive to light. Right eye exhibits no discharge. Left eye exhibits no discharge.   Neck: Normal range of motion.   Cardiovascular: Normal rate, regular rhythm and normal heart sounds.    No murmur heard.  Pulmonary/Chest: Effort normal and breath sounds normal. No respiratory distress. He has no wheezes. He has no rales.   Musculoskeletal: Normal range of motion.   Lymphadenopathy:     He has no cervical adenopathy.   Neurological: He is alert and oriented to person, place, and time.   Skin: Skin is warm and dry. He is not diaphoretic.   Psychiatric: He has a normal mood and affect. His behavior is normal.   Nursing note and vitals reviewed.         PMH:  has a past medical history of Anxiety; Bipolar affective disorder (CMS-HCC); Depression; and GERD (gastroesophageal reflux disease).  MEDS:   Current Outpatient Prescriptions:   •  divalproex (DEPAKOTE) 250 MG Tablet Delayed Response, Take 250 mg by mouth 3 times a day., Disp: , Rfl:   •  Testosterone 20.25 MG/1.25GM (1.62%) Gel, 2 1/2 pumps per day, Disp: 75 g, Rfl: 0  •  albuterol 108 (90 Base) MCG/ACT Aero Soln inhalation aerosol, Inhale 2 Puffs by mouth every 6 hours as needed for Shortness of Breath., Disp: 8.5 g, Rfl: 2  •  atorvastatin (LIPITOR) 10 MG Tab, TAKE 1 TABLET BY MOUTH EVERY DAY, Disp:  30 Tab, Rfl: 2  •  clotrimazole-betamethasone (LOTRISONE) 1-0.05 % Cream, Apply sparingly to face rash, Disp: 1 Tube, Rfl: 0  •  omeprazole (PRILOSEC) 20 MG delayed-release capsule, Take 1 Cap by mouth every day., Disp: 30 Cap, Rfl: 3  •  alprazolam (XANAX) 0.25 MG Tab, , Disp: , Rfl:   •  tizanidine (ZANAFLEX) 4 MG Tab, Take 1 Tab by mouth at bedtime as needed., Disp: 30 Tab, Rfl: 5  •  triamcinolone acetonide (KENALOG) 0.1 % Cream, Apply 1- 2 x a day as needed for rash to neck, Disp: 1 Tube, Rfl: 2  •  ANDROGEL PUMP 20.25 MG/ACT (1.62%) Gel, 2 Sprays to arm, Disp: 20 g, Rfl: 2  •  fluoxetine (PROZAC) 10 MG Cap, TK ONE C PO  QHS, Disp: , Rfl: 0  •  fluticasone (FLONASE) 50 MCG/ACT nasal spray, SPRAYS 2 SPRAYS IN EACH NOSTRIL QD, Disp: , Rfl: 0  •  tretinoin (RETIN-A) 0.1 % cream, Apply very small amt to acne at night, Disp: 45 g, Rfl: 0  •  oseltamivir (TAMIFLU) 75 MG Cap, Take 1 Cap by mouth 2 times a day for 5 days., Disp: 10 Cap, Rfl: 0  •  benzonatate (TESSALON) 100 MG Cap, Take 1 Cap by mouth 3 times a day as needed., Disp: 30 Cap, Rfl: 0  •  ondansetron (ZOFRAN ODT) 4 MG TABLET DISPERSIBLE, Take 1 Tab by mouth every 8 hours as needed., Disp: 6 Tab, Rfl: 0  ALLERGIES:   Allergies   Allergen Reactions   • Pcn [Penicillins] Hives   • Sulfa Drugs Hives     SURGHX:   Past Surgical History:   Procedure Laterality Date   • HERNIA REPAIR  1982   • ABDOMINAL EXPLORATION       SOCHX:  reports that he has never smoked. He has never used smokeless tobacco. He reports that he does not drink alcohol or use drugs.  FH: family history includes Arthritis in his father; Cancer in his maternal grandfather and paternal grandfather; Heart Disease in his maternal grandmother and maternal uncle; Lung Disease in his maternal grandfather; Psychiatry in his paternal aunt.       Assessment/Plan:     1. Viral URI with cough    Reassured patient again that his symptoms are most likely viral in etiology. No evidence for bacterial etiology  at today's visit, no indications for antibiotics. Encouraged patient to start taking tessalon perles as needed for symptomatic relief. Call or return to office if symptoms persist or worsen. The patient demonstrated a good understanding and agreed with the treatment plan.

## 2018-03-10 ASSESSMENT — ENCOUNTER SYMPTOMS
HEMOPTYSIS: 0
NERVOUS/ANXIOUS: 1
WEIGHT LOSS: 0
EYE REDNESS: 0
EYE DISCHARGE: 0

## 2018-03-10 ASSESSMENT — LIFESTYLE VARIABLES: SUBSTANCE_ABUSE: 0

## 2018-03-21 ENCOUNTER — APPOINTMENT (OUTPATIENT)
Dept: OTHER | Facility: MEDICAL CENTER | Age: 48
End: 2018-03-21
Payer: MEDICARE

## 2018-03-26 DIAGNOSIS — E34.9 TESTOSTERONE INSUFFICIENCY: ICD-10-CM

## 2018-03-26 RX ORDER — TESTOSTERONE 16.2 MG/G
GEL TRANSDERMAL
Qty: 75 G | Refills: 0 | Status: SHIPPED | OUTPATIENT
Start: 2018-03-26 | End: 2018-05-02 | Stop reason: SDUPTHER

## 2018-03-26 NOTE — TELEPHONE ENCOUNTER
Rx faxed to his pharmacy     [id number] - [place], [place] - [address] AT [address] of [address]  [address] [address]  Phone: [phone] Fax: [phone]

## 2018-04-01 DIAGNOSIS — E78.5 HYPERLIPIDEMIA, MILD: ICD-10-CM

## 2018-04-02 RX ORDER — ATORVASTATIN CALCIUM 10 MG/1
TABLET, FILM COATED ORAL
Qty: 30 TAB | Refills: 0 | Status: SHIPPED | OUTPATIENT
Start: 2018-04-02 | End: 2018-05-02 | Stop reason: SDUPTHER

## 2018-05-02 ENCOUNTER — OFFICE VISIT (OUTPATIENT)
Dept: MEDICAL GROUP | Facility: MEDICAL CENTER | Age: 48
End: 2018-05-02
Attending: NURSE PRACTITIONER
Payer: MEDICARE

## 2018-05-02 VITALS
TEMPERATURE: 98.7 F | WEIGHT: 241.6 LBS | HEIGHT: 75 IN | SYSTOLIC BLOOD PRESSURE: 121 MMHG | OXYGEN SATURATION: 95 % | DIASTOLIC BLOOD PRESSURE: 72 MMHG | BODY MASS INDEX: 30.04 KG/M2 | HEART RATE: 75 BPM | RESPIRATION RATE: 17 BRPM

## 2018-05-02 DIAGNOSIS — Z12.5 SCREENING FOR PROSTATE CANCER: ICD-10-CM

## 2018-05-02 DIAGNOSIS — Z13.0 SCREENING FOR IRON DEFICIENCY ANEMIA: ICD-10-CM

## 2018-05-02 DIAGNOSIS — E34.9 TESTOSTERONE INSUFFICIENCY: ICD-10-CM

## 2018-05-02 DIAGNOSIS — M54.2 NECK DISCOMFORT: ICD-10-CM

## 2018-05-02 DIAGNOSIS — Z13.29 SCREENING FOR THYROID DISORDER: ICD-10-CM

## 2018-05-02 DIAGNOSIS — E78.5 HYPERLIPIDEMIA, MILD: ICD-10-CM

## 2018-05-02 DIAGNOSIS — Z13.220 SCREENING FOR CHOLESTEROL LEVEL: ICD-10-CM

## 2018-05-02 DIAGNOSIS — F99 PSYCHIATRIC DISORDER: ICD-10-CM

## 2018-05-02 DIAGNOSIS — Z13.21 ENCOUNTER FOR VITAMIN DEFICIENCY SCREENING: ICD-10-CM

## 2018-05-02 DIAGNOSIS — Z13.1 SCREENING FOR DIABETES MELLITUS: ICD-10-CM

## 2018-05-02 DIAGNOSIS — K21.00 GASTROESOPHAGEAL REFLUX DISEASE WITH ESOPHAGITIS: ICD-10-CM

## 2018-05-02 DIAGNOSIS — Z87.19 HISTORY OF BARRETT'S ESOPHAGUS: ICD-10-CM

## 2018-05-02 PROCEDURE — 99213 OFFICE O/P EST LOW 20 MIN: CPT | Performed by: NURSE PRACTITIONER

## 2018-05-02 PROCEDURE — 99214 OFFICE O/P EST MOD 30 MIN: CPT | Performed by: NURSE PRACTITIONER

## 2018-05-02 RX ORDER — TESTOSTERONE 20.25 MG/1.25G
GEL TOPICAL
Qty: 75 G | Refills: 0 | Status: SHIPPED | OUTPATIENT
Start: 2018-05-02 | End: 2018-06-02

## 2018-05-02 RX ORDER — ATORVASTATIN CALCIUM 10 MG/1
10 TABLET, FILM COATED ORAL
Qty: 30 TAB | Refills: 5 | Status: SHIPPED | OUTPATIENT
Start: 2018-05-02 | End: 2019-02-18 | Stop reason: SDUPTHER

## 2018-05-02 RX ORDER — TESTOSTERONE 20.25 MG/1.25G
GEL TOPICAL
Qty: 75 G | Refills: 0 | Status: SHIPPED | OUTPATIENT
Start: 2018-05-02 | End: 2018-05-02 | Stop reason: CLARIF

## 2018-05-02 RX ORDER — TESTOSTERONE 20.25 MG/1.25G
GEL TOPICAL
Qty: 75 G | Refills: 0 | Status: SHIPPED | OUTPATIENT
Start: 2018-07-04 | End: 2018-06-05 | Stop reason: SDUPTHER

## 2018-05-02 RX ORDER — TESTOSTERONE 16.2 MG/G
20.25 GEL TRANSDERMAL DAILY
Qty: 607.5 MG | Refills: 0 | Status: SHIPPED | OUTPATIENT
Start: 2018-06-01 | End: 2018-07-01

## 2018-05-02 RX ORDER — OMEPRAZOLE 20 MG/1
20 CAPSULE, DELAYED RELEASE ORAL DAILY
Qty: 30 CAP | Refills: 5 | Status: SHIPPED | OUTPATIENT
Start: 2018-05-02 | End: 2018-06-05

## 2018-05-02 NOTE — ASSESSMENT & PLAN NOTE
Pt has known Testosterone Deficiency and has been on supplemental Testosterone for some time.  I recommended a f/u PSA since he has not had labs in some time.

## 2018-05-02 NOTE — PROGRESS NOTES
Chief Complaint:   Chief Complaint   Patient presents with   • Medication Refill   • Referral Needed     physical therapy       HPI:  Jared is here today for request for refill of Testosterone RX    His past medical history includes:  Acne  Anxiety, Depression, Bipolar Disorder  Testosterone Use/Low Testosterone  GERD  Lawrence's Esophagus hx  Hernia Repair  Hyperlipidemia, mild ( new)  Nasal Septum Deviation with Mucosa thickening ( new)  Sleep apnea on CPAP mask  Chronic Neck Pain     Established with  Psychiatry- Isra Kate ( in Dr Champagne office)     Referrals Approved:  [facility] for Sleep Study  Dr Samayoa for deviated nasal septum ( seen 7/3/17)  [facility] fo rGERlv, Hx of Lawrence's Esophagus  Dermatology- [facility]        REview of REcords:  3/26/18 Teleohone Encounter for refill of Testosterone.  3/5/18 Urgent Care Visit for Cough, SOB, Neg Strep Test, Neg Flu test. RX for Tamiflu just in case  3/2/18 Clinic Visit for Viral URI symptoms w Dr Chahal  2/22/18 Clinic for f/u on SOB, PFT ordered. To f/u w Psychiatry  2/1/18 clinic visit for medication refills, shortness of breath on exertion. Orders for echo and chest x-ray, refill of testosterone Rx,  Rx for albuterol.  1/15/18 Physical Therapy Appt for Neck pain  1/2/18 Clinic for Facial skin irritation, Med REfill, RX Lotrisone, Testosterone, Prilosec,  F/u 3 months  12/21/17 [facility] for slight improvement of right lateral neck pain.  12/15/17 Urgent Care for Probably Influenza TX w Tamiflu, Robitussin w Codeine, Tessalon.  12/11/17 [facility] appt  11/13/17 Clinic for Results, RX Robaxin. Rx Testosterone  10/30/17 Clinic Visit for Left Chest Lump under left rib margin, Neck pain. Xray of C-spine ordered, Referral to PT, U/S chest and CXR ordered.  10/5/17 Clinic visit for f/u on Snoring, Rash---> Rx for Kenalog, RX Androgel, RE-refer GI  8/7/17 Clinic visit for Results, Acne concerns--> Refer Derm, RX  Lipitor, Retin-A (1 x rx), Androgel RX  7/18/17 Referred to ENT-DR Crook based on CT MAx/Facial  6/22/17 New Patient visit here at Clinic. Labs ordered. Referred to Sleep Study, Referred to ANNA Ryan  Report:    Testosterone insufficiency  Pt has known Testosterone Deficiency and has been on supplemental Testosterone for some time.  I recommended a f/u PSA since he has not had labs in some time.    Hyperlipidemia, mild  Hx of Hyperlipidemia, mild  On Atorvastatin 10 mg  No myalgias.  Recommend re-testing levels      Neck discomfort  Pt has ongoing neck pain and previously was w [facility].  Wants new referral.      Psychiatric disorder  Pt has hx of bipolar disorder and is followed by [facility] Psychiatry and taking his meds as prescribed.  Reports lots of energy.  Talks fast and does jump from topic to topic but is appropriate topics.  Denies suicidal ideation.  Wife is present and states [place] mood has been good.    Gastroesophageal reflux disease with esophagitis  Hx of GERD and Lawrence's esophagus. Reports is running low on omeprazole and needs refills. Denies black or bloody stools.      Patient Active Problem List    Diagnosis Date Noted   • Viral URI 03/02/2018   • Shortness of breath 02/01/2018   • Skin irritation 01/02/2018   • Poor vision 11/13/2017   • Lump in chest 10/30/2017   • Neck discomfort 10/30/2017   • Rash 10/05/2017   • Obesity (BMI 35.0-39.9 without comorbidity) ([facility]) 10/05/2017   • Hyperlipidemia, mild 08/07/2017   • Acne vulgaris 08/07/2017   • Routine health maintenance 06/22/2017   • Psychiatric disorder 06/22/2017   • Testosterone insufficiency 06/22/2017   • History of Lawrence's esophagus 06/22/2017   • Gastroesophageal reflux disease with esophagitis 06/22/2017   • Deviated nasal septum 06/22/2017   • Snores 06/22/2017       Allergies:Pcn [penicillins] and Sulfa drugs    Medicines as of today:  Current Outpatient Prescriptions   Medication Sig Dispense Refill   • [START  ON 7/4/2018] Testosterone 20.25 MG/1.25GM (1.62%) Gel 2 1/2 pumps per day 75 g 0   • Testosterone 20.25 MG/1.25GM (1.62%) Gel 2 1/2 pumps per day 75 g 0   • omeprazole (PRILOSEC) 20 MG delayed-release capsule Take 1 Cap by mouth every day. 30 Cap 5   • atorvastatin (LIPITOR) 10 MG Tab Take 1 Tab by mouth every day. 30 Tab 5   • [START ON 6/1/2018] Testosterone (ANDROGEL PUMP) 20.25 MG/ACT (1.62%) Gel Apply 20.25 mg to affected area(s) every day for 30 days. 607.5 mg 0   • benzonatate (TESSALON) 100 MG Cap Take 1 Cap by mouth 3 times a day as needed. 30 Cap 0   • divalproex (DEPAKOTE) 250 MG Tablet Delayed Response Take 250 mg by mouth 3 times a day.     • ondansetron (ZOFRAN ODT) 4 MG TABLET DISPERSIBLE Take 1 Tab by mouth every 8 hours as needed. 6 Tab 0   • albuterol 108 (90 Base) MCG/ACT Aero Soln inhalation aerosol Inhale 2 Puffs by mouth every 6 hours as needed for Shortness of Breath. 8.5 g 2   • clotrimazole-betamethasone (LOTRISONE) 1-0.05 % Cream Apply sparingly to face rash 1 Tube 0   • alprazolam (XANAX) 0.25 MG Tab      • tizanidine (ZANAFLEX) 4 MG Tab Take 1 Tab by mouth at bedtime as needed. 30 Tab 5   • triamcinolone acetonide (KENALOG) 0.1 % Cream Apply 1- 2 x a day as needed for rash to neck 1 Tube 2   • fluoxetine (PROZAC) 10 MG Cap TK ONE C PO  QHS  0   • fluticasone (FLONASE) 50 MCG/ACT nasal spray SPRAYS 2 SPRAYS IN EACH NOSTRIL QD  0   • tretinoin (RETIN-A) 0.1 % cream Apply very small amt to acne at night 45 g 0     No current facility-administered medications for this visit.        Social History   Substance Use Topics   • Smoking status: Never Smoker   • Smokeless tobacco: Never Used   • Alcohol use No       Past Medical History:   Diagnosis Date   • Anxiety    • Bipolar affective disorder (HCC)    • Depression    • GERD (gastroesophageal reflux disease)        Family History   Problem Relation Age of Onset   • Arthritis Father    • Heart Disease Maternal Uncle    • Psychiatry Paternal Aunt   "  • Heart Disease Maternal Grandmother    • Lung Disease Maternal Grandfather    • Cancer Maternal Grandfather    • Cancer Paternal Grandfather        ROS:  Review of Systems   See HPI Above    Exam:  Blood pressure 121/72, pulse 75, temperature 37.1 °C (98.7 °F), resp. rate 17, height 1.905 m (6' 3\"), weight 109.6 kg (241 lb 9.6 oz), SpO2 95 %. Body mass index is 30.2 kg/m².    General:  Well nourished, well developed male in NAD  HENT:Head is grossly normal. PERRL.  Neck: Supple. Trachea is midline.  Pulmonary: Clear to ausculation .  Normal effort. No rales, ronchi, or wheezing.   Cardiovascular: Regular rate and rhythm.  Abdomen-Abdomen is soft, No tenderness.  Upper extremities-  Good ROM  Lower extremities- neg for edema, redness, tenderness.  Neuro- A & O x 4. Speech clear and appropriate. Very talkative and jumps to other topic quickly.    Current medications, allergies, and problem list reviewed with patient and updated in [facility] today.    Assessment/Plan:  1. Testosterone insufficiency  ANTI-NUCLEAR ANTIBODY SERUM    Testosterone 20.25 MG/1.25GM (1.62%) Gel  May to June    Testosterone 20.25 MG/1.25GM (1.62%) Gel  June to July    REFERRAL TO UROLOGY    Testosterone (ANDROGEL PUMP) 20.25 MG/ACT (1.62%) Gel  July to August       2. Hyperlipidemia, mild  COMP METABOLIC PANEL    LIPID PROFILE    atorvastatin (LIPITOR) 10 MG Tab   3. Screening for iron deficiency anemia  CBC WITH DIFFERENTIAL    IRON/TOTAL IRON BIND   4. Screening for prostate cancer  PROSTATE SPECIFIC AG SCREENING   5. Screening for thyroid disorder  TSH   6. Screening for cholesterol level  LIPID PROFILE   7. Screening for diabetes mellitus  COMP METABOLIC PANEL   8. Encounter for vitamin deficiency screening  VITAMIN B12    VITAMIN D,25 HYDROXY   9. History of Lawrence's esophagus  omeprazole (PRILOSEC) 20 MG delayed-release capsule   10. Gastroesophageal reflux disease with esophagitis  omeprazole (PRILOSEC) 20 MG delayed-release capsule   11. " Neck discomfort  REFERRAL TO PHYSICAL THERAPY Reason for Therapy: Eval/Treat/Report   12. Psychiatric disorder  Continue medications per Psychiatry   >25 mins spent face to face with patient, >50% of time spent counseling, reviewing records/labs, discussing POC, educating patient  About testosterone effects and need to monitor PSA and other labs due to his testosterone supplementation    Return in about 2 weeks (around 5/16/2018) for lab results.

## 2018-05-03 NOTE — ASSESSMENT & PLAN NOTE
Hx of GERD and Lawrence's esophagus. Reports is running low on omeprazole and needs refills. Denies black or bloody stools.

## 2018-05-03 NOTE — ASSESSMENT & PLAN NOTE
Pt has hx of bipolar disorder and is followed by [facility] Psychiatry and taking his meds as prescribed.  Reports lots of energy.  Talks fast and does jump from topic to topic but is appropriate topics.  Denies suicidal ideation.  Wife is present and states [place] mood has been good.

## 2018-05-07 ENCOUNTER — TELEPHONE (OUTPATIENT)
Dept: MEDICAL GROUP | Facility: MEDICAL CENTER | Age: 48
End: 2018-05-07

## 2018-05-07 NOTE — TELEPHONE ENCOUNTER
VOICEMAIL  1. Caller Name: Pt                      Call Back Number: [phone] (home)       2. Message: Pt called in, states his Androgel is not able to be picked up until the 17th, states he will be out of the medication and is requesting you call the pharmacy to release it earlier. Thank you, please advise.    3. Patient approves office to leave a detailed voicemail/MyChart message: yes

## 2018-05-08 DIAGNOSIS — K21.00 GASTROESOPHAGEAL REFLUX DISEASE WITH ESOPHAGITIS: ICD-10-CM

## 2018-05-08 DIAGNOSIS — Z87.19 HISTORY OF BARRETT'S ESOPHAGUS: ICD-10-CM

## 2018-05-08 RX ORDER — OMEPRAZOLE 20 MG/1
CAPSULE, DELAYED RELEASE ORAL
Qty: 30 CAP | Refills: 2 | Status: SHIPPED | OUTPATIENT
Start: 2018-05-08 | End: 2018-10-06 | Stop reason: SDUPTHER

## 2018-05-08 NOTE — TELEPHONE ENCOUNTER
Please call Jared and let him know that he will need to wait for   When pharmacy can release it. I will not call pharmacy early  [place]

## 2018-05-09 ENCOUNTER — HOSPITAL ENCOUNTER (OUTPATIENT)
Dept: LAB | Facility: MEDICAL CENTER | Age: 48
End: 2018-05-09
Attending: NURSE PRACTITIONER
Payer: MEDICARE

## 2018-05-09 DIAGNOSIS — Z13.21 ENCOUNTER FOR VITAMIN DEFICIENCY SCREENING: ICD-10-CM

## 2018-05-09 DIAGNOSIS — Z12.5 SCREENING FOR PROSTATE CANCER: ICD-10-CM

## 2018-05-09 DIAGNOSIS — Z13.220 SCREENING FOR CHOLESTEROL LEVEL: ICD-10-CM

## 2018-05-09 DIAGNOSIS — Z13.0 SCREENING FOR IRON DEFICIENCY ANEMIA: ICD-10-CM

## 2018-05-09 DIAGNOSIS — Z13.29 SCREENING FOR THYROID DISORDER: ICD-10-CM

## 2018-05-09 DIAGNOSIS — E78.5 HYPERLIPIDEMIA, MILD: ICD-10-CM

## 2018-05-09 DIAGNOSIS — E34.9 TESTOSTERONE INSUFFICIENCY: ICD-10-CM

## 2018-05-09 DIAGNOSIS — Z13.1 SCREENING FOR DIABETES MELLITUS: ICD-10-CM

## 2018-05-09 LAB
25(OH)D3 SERPL-MCNC: 73 NG/ML (ref 30–100)
ALBUMIN SERPL BCP-MCNC: 4.3 G/DL (ref 3.2–4.9)
ALBUMIN/GLOB SERPL: 1.4 G/DL
ALP SERPL-CCNC: 58 U/L (ref 30–99)
ALT SERPL-CCNC: 26 U/L (ref 2–50)
ANION GAP SERPL CALC-SCNC: 10 MMOL/L (ref 0–11.9)
AST SERPL-CCNC: 24 U/L (ref 12–45)
BASOPHILS # BLD AUTO: 0.7 % (ref 0–1.8)
BASOPHILS # BLD: 0.04 K/UL (ref 0–0.12)
BILIRUB SERPL-MCNC: 0.7 MG/DL (ref 0.1–1.5)
BUN SERPL-MCNC: 11 MG/DL (ref 8–22)
CALCIUM SERPL-MCNC: 9.6 MG/DL (ref 8.5–10.5)
CHLORIDE SERPL-SCNC: 104 MMOL/L (ref 96–112)
CHOLEST SERPL-MCNC: 133 MG/DL (ref 100–199)
CO2 SERPL-SCNC: 27 MMOL/L (ref 20–33)
CREAT SERPL-MCNC: 1.01 MG/DL (ref 0.5–1.4)
EOSINOPHIL # BLD AUTO: 0.11 K/UL (ref 0–0.51)
EOSINOPHIL NFR BLD: 1.8 % (ref 0–6.9)
ERYTHROCYTE [DISTWIDTH] IN BLOOD BY AUTOMATED COUNT: 49 FL (ref 35.9–50)
GLOBULIN SER CALC-MCNC: 3.1 G/DL (ref 1.9–3.5)
GLUCOSE SERPL-MCNC: 91 MG/DL (ref 65–99)
HCT VFR BLD AUTO: 54.1 % (ref 42–52)
HDLC SERPL-MCNC: 36 MG/DL
HGB BLD-MCNC: 17.6 G/DL (ref 14–18)
IMM GRANULOCYTES # BLD AUTO: 0.02 K/UL (ref 0–0.11)
IMM GRANULOCYTES NFR BLD AUTO: 0.3 % (ref 0–0.9)
IRON SATN MFR SERPL: 21 % (ref 15–55)
IRON SERPL-MCNC: 80 UG/DL (ref 50–180)
LDLC SERPL CALC-MCNC: 81 MG/DL
LYMPHOCYTES # BLD AUTO: 2.41 K/UL (ref 1–4.8)
LYMPHOCYTES NFR BLD: 40.4 % (ref 22–41)
MCH RBC QN AUTO: 28.3 PG (ref 27–33)
MCHC RBC AUTO-ENTMCNC: 32.5 G/DL (ref 33.7–35.3)
MCV RBC AUTO: 86.8 FL (ref 81.4–97.8)
MONOCYTES # BLD AUTO: 0.49 K/UL (ref 0–0.85)
MONOCYTES NFR BLD AUTO: 8.2 % (ref 0–13.4)
NEUTROPHILS # BLD AUTO: 2.89 K/UL (ref 1.82–7.42)
NEUTROPHILS NFR BLD: 48.6 % (ref 44–72)
NRBC # BLD AUTO: 0 K/UL
NRBC BLD-RTO: 0 /100 WBC
PLATELET # BLD AUTO: 296 K/UL (ref 164–446)
PMV BLD AUTO: 9.6 FL (ref 9–12.9)
POTASSIUM SERPL-SCNC: 4.5 MMOL/L (ref 3.6–5.5)
PROT SERPL-MCNC: 7.4 G/DL (ref 6–8.2)
PSA SERPL-MCNC: 0.6 NG/ML (ref 0–4)
RBC # BLD AUTO: 6.23 M/UL (ref 4.7–6.1)
SODIUM SERPL-SCNC: 141 MMOL/L (ref 135–145)
TIBC SERPL-MCNC: 375 UG/DL (ref 250–450)
TRIGL SERPL-MCNC: 79 MG/DL (ref 0–149)
TSH SERPL DL<=0.005 MIU/L-ACNC: 0.73 UIU/ML (ref 0.38–5.33)
VIT B12 SERPL-MCNC: 685 PG/ML (ref 211–911)
WBC # BLD AUTO: 6 K/UL (ref 4.8–10.8)

## 2018-05-09 PROCEDURE — 80061 LIPID PANEL: CPT

## 2018-05-09 PROCEDURE — 86039 ANTINUCLEAR ANTIBODIES (ANA): CPT

## 2018-05-09 PROCEDURE — 86038 ANTINUCLEAR ANTIBODIES: CPT

## 2018-05-09 PROCEDURE — 80053 COMPREHEN METABOLIC PANEL: CPT

## 2018-05-09 PROCEDURE — 82306 VITAMIN D 25 HYDROXY: CPT | Mod: GA

## 2018-05-09 PROCEDURE — 82607 VITAMIN B-12: CPT

## 2018-05-09 PROCEDURE — 83540 ASSAY OF IRON: CPT | Mod: GA

## 2018-05-09 PROCEDURE — 85025 COMPLETE CBC W/AUTO DIFF WBC: CPT

## 2018-05-09 PROCEDURE — 36415 COLL VENOUS BLD VENIPUNCTURE: CPT

## 2018-05-09 PROCEDURE — 84153 ASSAY OF PSA TOTAL: CPT | Mod: GA

## 2018-05-09 PROCEDURE — 83550 IRON BINDING TEST: CPT | Mod: GA

## 2018-05-09 PROCEDURE — 84443 ASSAY THYROID STIM HORMONE: CPT

## 2018-05-11 LAB
NUCLEAR IGG SER QL IA: DETECTED
NUCLEAR IGG TITR SER IF: ABNORMAL {TITER}

## 2018-05-14 ENCOUNTER — OFFICE VISIT (OUTPATIENT)
Dept: MEDICAL GROUP | Facility: MEDICAL CENTER | Age: 48
End: 2018-05-14
Attending: NURSE PRACTITIONER
Payer: MEDICARE

## 2018-05-14 VITALS
HEIGHT: 75 IN | SYSTOLIC BLOOD PRESSURE: 118 MMHG | RESPIRATION RATE: 18 BRPM | OXYGEN SATURATION: 97 % | WEIGHT: 238 LBS | BODY MASS INDEX: 29.59 KG/M2 | HEART RATE: 78 BPM | DIASTOLIC BLOOD PRESSURE: 78 MMHG | TEMPERATURE: 98 F

## 2018-05-14 DIAGNOSIS — E34.9 TESTOSTERONE INSUFFICIENCY: ICD-10-CM

## 2018-05-14 PROCEDURE — 99213 OFFICE O/P EST LOW 20 MIN: CPT | Performed by: NURSE PRACTITIONER

## 2018-05-14 ASSESSMENT — PATIENT HEALTH QUESTIONNAIRE - PHQ9: CLINICAL INTERPRETATION OF PHQ2 SCORE: 0

## 2018-05-14 ASSESSMENT — ACTIVITIES OF DAILY LIVING (ADL): BATHING_REQUIRES_ASSISTANCE: 0

## 2018-05-14 NOTE — PROGRESS NOTES
Chief Complaint   Patient presents with   • Annual Exam         HPI:  Jared gR is a 47 y.o. here for Medicare Annual Wellness Visit     Patient Active Problem List    Diagnosis Date Noted   • Viral URI 03/02/2018   • Shortness of breath 02/01/2018   • Skin irritation 01/02/2018   • Poor vision 11/13/2017   • Lump in chest 10/30/2017   • Neck discomfort 10/30/2017   • Rash 10/05/2017   • Obesity (BMI 35.0-39.9 without comorbidity) ([facility]) 10/05/2017   • Hyperlipidemia, mild 08/07/2017   • Acne vulgaris 08/07/2017   • Routine health maintenance 06/22/2017   • Psychiatric disorder 06/22/2017   • Testosterone insufficiency 06/22/2017   • History of Lawrence's esophagus 06/22/2017   • Gastroesophageal reflux disease with esophagitis 06/22/2017   • Deviated nasal septum 06/22/2017   • Snores 06/22/2017       Current Outpatient Prescriptions   Medication Sig Dispense Refill   • omeprazole (PRILOSEC) 20 MG delayed-release capsule TAKE 1 CAPSULE BY MOUTH EVERY DAY 30 Cap 2   • [START ON 7/4/2018] Testosterone 20.25 MG/1.25GM (1.62%) Gel 2 1/2 pumps per day 75 g 0   • Testosterone 20.25 MG/1.25GM (1.62%) Gel 2 1/2 pumps per day 75 g 0   • omeprazole (PRILOSEC) 20 MG delayed-release capsule Take 1 Cap by mouth every day. 30 Cap 5   • atorvastatin (LIPITOR) 10 MG Tab Take 1 Tab by mouth every day. 30 Tab 5   • [START ON 6/1/2018] Testosterone (ANDROGEL PUMP) 20.25 MG/ACT (1.62%) Gel Apply 20.25 mg to affected area(s) every day for 30 days. 607.5 mg 0   • benzonatate (TESSALON) 100 MG Cap Take 1 Cap by mouth 3 times a day as needed. 30 Cap 0   • divalproex (DEPAKOTE) 250 MG Tablet Delayed Response Take 250 mg by mouth 3 times a day.     • ondansetron (ZOFRAN ODT) 4 MG TABLET DISPERSIBLE Take 1 Tab by mouth every 8 hours as needed. 6 Tab 0   • albuterol 108 (90 Base) MCG/ACT Aero Soln inhalation aerosol Inhale 2 Puffs by mouth every 6 hours as needed for Shortness of Breath. 8.5 g 2   • clotrimazole-betamethasone  (LOTRISONE) 1-0.05 % Cream Apply sparingly to face rash 1 Tube 0   • alprazolam (XANAX) 0.25 MG Tab      • tizanidine (ZANAFLEX) 4 MG Tab Take 1 Tab by mouth at bedtime as needed. 30 Tab 5   • triamcinolone acetonide (KENALOG) 0.1 % Cream Apply 1- 2 x a day as needed for rash to neck 1 Tube 2   • fluoxetine (PROZAC) 10 MG Cap TK ONE C PO  QHS  0   • fluticasone (FLONASE) 50 MCG/ACT nasal spray SPRAYS 2 SPRAYS IN EACH NOSTRIL QD  0   • tretinoin (RETIN-A) 0.1 % cream Apply very small amt to acne at night 45 g 0     No current facility-administered medications for this visit.             Current supplements as per medication list.       Allergies: Pcn [penicillins] and Sulfa drugs    Current social contact/activities: family and    Patient's perception of their health: good    He  reports that he has never smoked. He has never used smokeless tobacco. He reports that he does not drink alcohol or use drugs.  Counseling given: Not Answered      DPA/Advanced Directive:  Patient does not have an Advanced Directive.  A packet and workshop information was given on Advanced Directives.    ROS:    Gait: Uses no assistive device  Ostomy: No  Other tubes: No  Amputations: No  Chronic oxygen use: No  Last eye exam:5/1/18  Wears hearing aids: No   : Denies any urinary leakage during the last 6 months    Screening:    Depression Screening    Little interest or pleasure in doing things?  0 - not at all  Feeling down, depressed , or hopeless? 0 - not at all  Patient Health Questionnaire Score: 0     If depressive symptoms identified deferred to follow up visit unless specifically addressed in assessment and plan.    Interpretation of PHQ-9 Total Score   Score Severity   1-4 No Depression   5-9 Mild Depression   10-14 Moderate Depression   15-19 Moderately Severe Depression   20-27 Severe Depression    Screening for Cognitive Impairment    Three Minute Recall (apple, watch, devendra)  3/3    Draw clock face with all 12 numbers  set to the hand to show 10 minutes past 11 o'clock  1    Cognitive concerns identified deferred for follow up unless specifically addressed in assessment and plan.    Fall Risk Assessment    Has the patient had two or more falls in the last year or any fall with injury in the last year?  No    Safety Assessment    Throw rugs on floor.  Yes, one in bathroom  Handrails on all stairs.  N/A  Good lighting in all hallways.  Yes  Difficulty hearing.  No  Patient counseled about all safety risks that were identified.    Functional Assessment ADLs    Are there any barriers preventing you from cooking for yourself or meeting nutritional needs?  No.    Are there any barriers preventing you from driving safely or obtaining transportation?  No.    Are there any barriers preventing you from using a telephone or calling for help?  No.    Are there any barriers preventing you from shopping?  No.    Are there any barriers preventing you from taking care of your own finances?  No.    Are there any barriers preventing you from managing your medications?  No.    Are there any barriers preventing you from showering, bathing or dressing yourself?  No.    Are currently engaging any exercise or physical activity?  Yes.   But limited.    Health Maintenance Summary                Annual Wellness Visit Completed 5/14/18 1971     IMM INFLUENZA Postponed 12/1/2019 Originally 9/1/2018. Patient Refused     Done 10/28/2015 Ext Imm: Influenza w/preserv.    IMM DTaP/Tdap/Td Vaccine Postponed 1/1/2020 Originally 9/29/1990. Patient Refused          Patient Care Team:  CHESTER Lombardi as PCP - General (Family Medicine)  Kourtney Juarez, PT, MSPT as Physical Therapy        Social History   Substance Use Topics   • Smoking status: Never Smoker   • Smokeless tobacco: Never Used   • Alcohol use No     Family History   Problem Relation Age of Onset   • Arthritis Father    • Heart Disease Maternal Uncle    • Psychiatry Paternal Aunt    •  "Heart Disease Maternal Grandmother    • Lung Disease Maternal Grandfather    • Cancer Maternal Grandfather    • Cancer Paternal Grandfather      He  has a past medical history of Anxiety; Bipolar affective disorder (HCC); Depression; and GERD (gastroesophageal reflux disease).   Past Surgical History:   Procedure Laterality Date   • HERNIA REPAIR  1982   • ABDOMINAL EXPLORATION         Exam:   Blood pressure 118/78, pulse 78, temperature 36.7 °C (98 °F), resp. rate 18, height 1.905 m (6' 3\"), weight 108 kg (238 lb), SpO2 97 %. Body mass index is 29.75 kg/m².    Hearing excellent.    Dentition good  Alert, oriented in no acute distress.  Eye contact is good, speech goal directed, affect calm    Assessment and Plan. The following treatment and monitoring plan is recommended:  No recommendations at this time. Continue ongoing health maintenance and health plan.  Annual Wellness Visit      Services suggested: No services needed at this time  Health Care Screening: Age-appropriate preventive services recommended by USPTF and ACIP covered by Medicare were discussed today. Services ordered if indicated and agreed upon by the patient.  Referrals offered: Community-based lifestyle interventions to reduce health risks and promote self-management and wellness, fall prevention, nutrition, physical activity, tobacco-use cessation, weight loss, and mental health services as per orders if indicated.    Discussion today about general wellness and lifestyle habits:    · Prevent falls and reduce trip hazards; Cautioned about securing or removing rugs.  · Have a working fire alarm and carbon monoxide detector;   · Engage in regular physical activity and social activities     Follow-up: 3 weeks to discuss Testosterone dosing  "

## 2018-05-14 NOTE — PROGRESS NOTES
Chief Complaint: No chief complaint on file.      HPI:  Jared is here today for REsults  His past medical history includes:  Acne  Anxiety, Depression, Bipolar Disorder  Testosterone Use/Low Testosterone  GERD  Lawrence's Esophagus hx  Hernia Repair  Hyperlipidemia, mild ( new)  Nasal Septum Deviation with Mucosa thickening ( new)  Sleep apnea on CPAP mask  Chronic Neck Pain     Established with  Psychiatry- Isra Kate ( in Dr Champagne office)     Referrals Approved:  [facility] for Sleep Study  Dr Samayoa for deviated nasal septum ( seen 7/3/17)  [facility]  for GERd, Hx of Lawrence's Esophagus  Dermatology- [facility]  Urology [facility]        REview of REcords:  5/9/18 Labs- CBC shows elevated RBC's, CMP normal, iron Levels normal, Lipids Normal except HDL= 31, Vit B12 normal,  Vit D= 73, TSH= 0.730, SE= positive but <1:80.  5/2/18 Clinic Visit for Testosterone Refill, Referred to Physical Therapy, Labs ordered.  3/26/18 Teleohone Encounter for refill of Testosterone.  3/5/18 Urgent Care Visit for Cough, SOB, Neg Strep Test, Neg Flu test. RX for Tamiflu just in case  3/2/18 Clinic Visit for Viral URI symptoms w Dr Chahal  2/22/18 Clinic for f/u on SOB, PFT ordered. To f/u w Psychiatry  2/1/18 clinic visit for medication refills, shortness of breath on exertion. Orders for echo and chest x-ray, refill of testosterone Rx,  Rx for albuterol.  1/15/18 Physical Therapy Appt for Neck pain  1/2/18 Clinic for Facial skin irritation, Med REfill, RX Lotrisone, Testosterone, Prilosec,  F/u 3 months  12/21/17 [facility] for slight improvement of right lateral neck pain.  12/15/17 Urgent Care for Probably Influenza TX w Tamiflu, Robitussin w Codeine, Tessalon.  12/11/17 [facility] appt  11/13/17 Clinic for Results, RX Robaxin. Rx Testosterone  10/30/17 Clinic Visit for Left Chest Lump under left rib margin, Neck pain. Xray of C-spine ordered, Referral to PT,  U/S chest and CXR ordered.  10/5/17 Clinic visit for f/u on Snoring, Rash---> Rx for Kenalog, RX Androgel, RE-refer GI  8/7/17 Clinic visit for Results, Acne concerns--> Refer Derm, RX Lipitor, Retin-A (1 x rx), Androgel RX  7/18/17 Referred to ENT-DR Crook based on CT MAx/Facial  6/22/17 New Patient visit here at Clinic. Labs ordered. Referred to Sleep Study, Referred to GI     [place]  Report:  4/18/18 Androgel 1.62% 75 by me  3/26/18 Androgel 1.62%  75 by me  3/26/18 Xanax 0.25 # 90 by Isra Kate  27 RX and 2 Prescribers.    No problem-specific Assessment & Plan notes found for this encounter.      Patient Active Problem List    Diagnosis Date Noted   • Viral URI 03/02/2018   • Shortness of breath 02/01/2018   • Skin irritation 01/02/2018   • Poor vision 11/13/2017   • Lump in chest 10/30/2017   • Neck discomfort 10/30/2017   • Rash 10/05/2017   • Obesity (BMI 35.0-39.9 without comorbidity) ([facility]) 10/05/2017   • Hyperlipidemia, mild 08/07/2017   • Acne vulgaris 08/07/2017   • Routine health maintenance 06/22/2017   • Psychiatric disorder 06/22/2017   • Testosterone insufficiency 06/22/2017   • History of Lawrence's esophagus 06/22/2017   • Gastroesophageal reflux disease with esophagitis 06/22/2017   • Deviated nasal septum 06/22/2017   • Snores 06/22/2017       Allergies:Pcn [penicillins] and Sulfa drugs    Medicines as of today:  Current Outpatient Prescriptions   Medication Sig Dispense Refill   • omeprazole (PRILOSEC) 20 MG delayed-release capsule TAKE 1 CAPSULE BY MOUTH EVERY DAY 30 Cap 2   • [START ON 7/4/2018] Testosterone 20.25 MG/1.25GM (1.62%) Gel 2 1/2 pumps per day 75 g 0   • Testosterone 20.25 MG/1.25GM (1.62%) Gel 2 1/2 pumps per day 75 g 0   • omeprazole (PRILOSEC) 20 MG delayed-release capsule Take 1 Cap by mouth every day. 30 Cap 5   • atorvastatin (LIPITOR) 10 MG Tab Take 1 Tab by mouth every day. 30 Tab 5   • [START ON 6/1/2018] Testosterone (ANDROGEL PUMP) 20.25 MG/ACT (1.62%) Gel Apply  20.25 mg to affected area(s) every day for 30 days. 607.5 mg 0   • benzonatate (TESSALON) 100 MG Cap Take 1 Cap by mouth 3 times a day as needed. 30 Cap 0   • divalproex (DEPAKOTE) 250 MG Tablet Delayed Response Take 250 mg by mouth 3 times a day.     • ondansetron (ZOFRAN ODT) 4 MG TABLET DISPERSIBLE Take 1 Tab by mouth every 8 hours as needed. 6 Tab 0   • albuterol 108 (90 Base) MCG/ACT Aero Soln inhalation aerosol Inhale 2 Puffs by mouth every 6 hours as needed for Shortness of Breath. 8.5 g 2   • clotrimazole-betamethasone (LOTRISONE) 1-0.05 % Cream Apply sparingly to face rash 1 Tube 0   • alprazolam (XANAX) 0.25 MG Tab      • tizanidine (ZANAFLEX) 4 MG Tab Take 1 Tab by mouth at bedtime as needed. 30 Tab 5   • triamcinolone acetonide (KENALOG) 0.1 % Cream Apply 1- 2 x a day as needed for rash to neck 1 Tube 2   • fluoxetine (PROZAC) 10 MG Cap TK ONE C PO  QHS  0   • fluticasone (FLONASE) 50 MCG/ACT nasal spray SPRAYS 2 SPRAYS IN EACH NOSTRIL QD  0   • tretinoin (RETIN-A) 0.1 % cream Apply very small amt to acne at night 45 g 0     No current facility-administered medications for this visit.        Social History   Substance Use Topics   • Smoking status: Never Smoker   • Smokeless tobacco: Never Used   • Alcohol use No       Past Medical History:   Diagnosis Date   • Anxiety    • Bipolar affective disorder (HCC)    • Depression    • GERD (gastroesophageal reflux disease)        Family History   Problem Relation Age of Onset   • Arthritis Father    • Heart Disease Maternal Uncle    • Psychiatry Paternal Aunt    • Heart Disease Maternal Grandmother    • Lung Disease Maternal Grandfather    • Cancer Maternal Grandfather    • Cancer Paternal Grandfather        ROS:  Review of Systems   See HPI Above    Exam:  There were no vitals taken for this visit. There is no height or weight on file to calculate BMI.    General:  Well nourished, well developed male in NAD  HENT:Head is grossly normal. PERRL.  Neck: Supple.  Trachea is midline.  Pulmonary: Clear to ausculation .  Normal effort. No rales, ronchi, or wheezing.   Cardiovascular: Regular rate and rhythm.  Abdomen-Abdomen is soft, No tenderness.  Upper extremities- Strong = . Good ROM  Lower extremities- neg for edema, redness, tenderness.  Neuro- A & O x 4. Speech clear and appropriate.    Current medications, allergies, and problem list reviewed with patient and updated in EPIC today.    Assessment/Plan:  No diagnosis found.    No Follow-up on file.

## 2018-05-16 ENCOUNTER — PHYSICAL THERAPY (OUTPATIENT)
Dept: PHYSICAL THERAPY | Facility: REHABILITATION | Age: 48
End: 2018-05-16
Attending: NURSE PRACTITIONER
Payer: MEDICARE

## 2018-05-16 DIAGNOSIS — M54.2 NECK PAIN: ICD-10-CM

## 2018-05-16 PROCEDURE — G8978 MOBILITY CURRENT STATUS: HCPCS | Mod: CJ

## 2018-05-16 PROCEDURE — 97162 PT EVAL MOD COMPLEX 30 MIN: CPT

## 2018-05-16 PROCEDURE — 97014 ELECTRIC STIMULATION THERAPY: CPT

## 2018-05-16 PROCEDURE — G8979 MOBILITY GOAL STATUS: HCPCS | Mod: CI

## 2018-05-16 ASSESSMENT — ENCOUNTER SYMPTOMS
PAIN SCALE AT HIGHEST: 6
PAIN SCALE: 3
QUALITY: THROBBING
ALLEVIATING FACTORS: EXERCISE
PAIN TIMING: ALL DAY
PAIN SCALE AT LOWEST: 1
PAIN TIMING: INTERMITTENT

## 2018-05-22 ENCOUNTER — APPOINTMENT (OUTPATIENT)
Dept: PHYSICAL THERAPY | Facility: REHABILITATION | Age: 48
End: 2018-05-22
Attending: NURSE PRACTITIONER
Payer: MEDICARE

## 2018-05-23 ENCOUNTER — PHYSICAL THERAPY (OUTPATIENT)
Dept: PHYSICAL THERAPY | Facility: REHABILITATION | Age: 48
End: 2018-05-23
Attending: NURSE PRACTITIONER
Payer: MEDICARE

## 2018-05-23 DIAGNOSIS — M54.2 NECK PAIN: ICD-10-CM

## 2018-05-23 DIAGNOSIS — M54.2 CERVICALGIA: ICD-10-CM

## 2018-05-23 DIAGNOSIS — M54.2 NECK DISCOMFORT: ICD-10-CM

## 2018-05-23 PROCEDURE — 97110 THERAPEUTIC EXERCISES: CPT

## 2018-05-23 PROCEDURE — 97014 ELECTRIC STIMULATION THERAPY: CPT

## 2018-05-23 NOTE — OP THERAPY DAILY TREATMENT
Outpatient Physical Therapy  DAILY TREATMENT     [facility] Physical [address].  [address]  [place] [address]  Phone:  [phone]  Fax:  [phone]    Date: 05/23/2018    Patient: Jared Rg  YOB: 1970  MRN: [id number]     Time Calculation  Start time: 0803  Stop time: 0850 Time Calculation (min): 47 minutes     Chief Complaint: Neck pain   Visit #: 2    SUBJECTIVE:  Patient reports some stiffness this am, but exercises are going well at home.     OBJECTIVE:  Current objective measures:   Cervical Rotation to the L: 88 degrees   Cervical Rotation to the R 83 degrees           Therapeutic Exercises (CPT 32424):     1. UE ergometer , 4 minutes      2. Foam roller , pec stretch, 2 minutes     3. Foam rolller , alternating UE x 10     4. Foam roller , bilateral UEx 10     5. Foam roller , sahrman UE x 10     6. AAROM cervical rotation with towel, x 10 each side (HEP)       Time-based treatments/modalities:  Therapeutic exercise minutes (CPT 10188): 30 minutes       Pain rating before treatment: 1  Pain rating after treatment: 1    ASSESSMENT:   Response to treatment: Patient has made some sxs improvement from initial evaluation. Patient continues to demonstrate bias of poor scapular/cervical stabilization.     PLAN/RECOMMENDATIONS:   Plan for treatment: therapy treatment to continue next visit.  Planned interventions for next visit: continue with current treatment.

## 2018-05-30 ENCOUNTER — APPOINTMENT (OUTPATIENT)
Dept: PHYSICAL THERAPY | Facility: REHABILITATION | Age: 48
End: 2018-05-30
Attending: NURSE PRACTITIONER
Payer: MEDICARE

## 2018-06-05 ENCOUNTER — OFFICE VISIT (OUTPATIENT)
Dept: MEDICAL GROUP | Facility: MEDICAL CENTER | Age: 48
End: 2018-06-05
Attending: NURSE PRACTITIONER
Payer: MEDICARE

## 2018-06-05 VITALS
HEART RATE: 86 BPM | DIASTOLIC BLOOD PRESSURE: 80 MMHG | RESPIRATION RATE: 18 BRPM | OXYGEN SATURATION: 96 % | SYSTOLIC BLOOD PRESSURE: 116 MMHG | BODY MASS INDEX: 29.09 KG/M2 | HEIGHT: 75 IN | WEIGHT: 234 LBS | TEMPERATURE: 98.9 F

## 2018-06-05 DIAGNOSIS — J30.89 ENVIRONMENTAL AND SEASONAL ALLERGIES: ICD-10-CM

## 2018-06-05 DIAGNOSIS — R21 RASH: ICD-10-CM

## 2018-06-05 DIAGNOSIS — E34.9 TESTOSTERONE INSUFFICIENCY: ICD-10-CM

## 2018-06-05 DIAGNOSIS — F99 PSYCHIATRIC DISORDER: ICD-10-CM

## 2018-06-05 PROCEDURE — 99212 OFFICE O/P EST SF 10 MIN: CPT | Performed by: NURSE PRACTITIONER

## 2018-06-05 PROCEDURE — 99214 OFFICE O/P EST MOD 30 MIN: CPT | Performed by: NURSE PRACTITIONER

## 2018-06-05 RX ORDER — LORATADINE 10 MG/1
10 TABLET ORAL DAILY
Qty: 30 TAB | Refills: 2 | Status: SHIPPED | OUTPATIENT
Start: 2018-06-05 | End: 2018-10-24

## 2018-06-05 RX ORDER — TESTOSTERONE 20.25 MG/1.25G
GEL TOPICAL
Qty: 90 G | Refills: 0 | Status: SHIPPED | OUTPATIENT
Start: 2018-08-03 | End: 2018-06-05 | Stop reason: SDUPTHER

## 2018-06-05 RX ORDER — CLONAZEPAM 0.5 MG/1
TABLET ORAL
Refills: 0 | COMMUNITY
Start: 2018-05-25 | End: 2018-10-24

## 2018-06-05 RX ORDER — FLUOXETINE HYDROCHLORIDE 40 MG/1
CAPSULE ORAL
Refills: 0 | COMMUNITY
Start: 2018-05-25 | End: 2018-08-23

## 2018-06-05 RX ORDER — TESTOSTERONE 20.25 MG/1.25G
GEL TOPICAL
Qty: 90 G | Refills: 0 | Status: SHIPPED | OUTPATIENT
Start: 2018-09-03 | End: 2018-06-05 | Stop reason: SDUPTHER

## 2018-06-05 RX ORDER — TESTOSTERONE 20.25 MG/1.25G
GEL TOPICAL
Qty: 90 G | Refills: 0 | Status: SHIPPED | OUTPATIENT
Start: 2018-06-05 | End: 2018-06-05 | Stop reason: SDUPTHER

## 2018-06-05 RX ORDER — TESTOSTERONE 20.25 MG/1.25G
GEL TOPICAL
Qty: 90 G | Refills: 0 | Status: SHIPPED | OUTPATIENT
Start: 2018-07-04 | End: 2018-06-05 | Stop reason: SDUPTHER

## 2018-06-05 RX ORDER — TESTOSTERONE 20.25 MG/1.25G
GEL TOPICAL
Qty: 90 G | Refills: 0 | Status: SHIPPED | OUTPATIENT
Start: 2018-06-05 | End: 2018-07-03

## 2018-06-05 RX ORDER — CLOTRIMAZOLE AND BETAMETHASONE DIPROPIONATE 10; .64 MG/G; MG/G
CREAM TOPICAL
Qty: 1 TUBE | Refills: 1 | Status: SHIPPED | OUTPATIENT
Start: 2018-06-05 | End: 2018-08-23 | Stop reason: SDUPTHER

## 2018-06-05 ASSESSMENT — PAIN SCALES - GENERAL: PAINLEVEL: NO PAIN

## 2018-06-05 NOTE — ASSESSMENT & PLAN NOTE
Pt reports [facility] is moving and a new Psychiatrist  He will be seeing in near future,     Continueds to take Clonazepam, Prozac, Depakote for his Bipolar Disorder,  Denies current depression or suicidal ideation.  He reports he is working out 6 days a week at the gym including both cardio and weightlifting.

## 2018-06-05 NOTE — ASSESSMENT & PLAN NOTE
Pt reports he is almost out of Testosterone.  Will refill.  Pt asking for increase as typically using 3 sprays per day.  We reviewed his labs including normal PSA.

## 2018-06-05 NOTE — ASSESSMENT & PLAN NOTE
Pt has rash to upper chest and neck.  States slight itchy feeling.  Also has environmental allergies.  Using flonase.

## 2018-06-05 NOTE — PROGRESS NOTES
Chief Complaint:   Chief Complaint   Patient presents with   • Follow-Up   • Medication Refill       HPI:  Jared is here today for a follow-up on Testosterone Deficiency    Nev  Report:   5/25/18 Clonazepam 0.5 # 14 by [facility]  5/14/18 Androgel 1.62 % gel 75 by me  28 RX and 2 Presribers.  ------------------------------------------      His past medical history includes:  Acne  Anxiety, Depression, Bipolar Disorder  Testosterone Use/Low Testosterone  GERD  Lawrence's Esophagus hx  Hernia Repair  Hyperlipidemia, mild ( new)  Nasal Septum Deviation with Mucosa thickening ( new)  Sleep apnea on CPAP mask  Chronic Neck Pain     Established with  Psychiatry- [facility] ( in Dr Champagne office)     Referrals Approved:  [facility] for Sleep Study  Dr Samayoa for deviated nasal septum ( seen 7/3/17)  [facility] fo Juan, Hx of Lawrence's Esophagus  Dermatology- Riley Cruz        REview of REcords:  5/16 and 5/23 Physical Therapy appt's  5/14/18 Annual Wellness visit, testosterone refill.   3/26/18 Teleohone Encounter for refill of Testosterone.  3/5/18 Urgent Care Visit for Cough, SOB, Neg Strep Test, Neg Flu test. RX for Tamiflu just in case    Rash  Pt has rash to upper chest and neck.  States slight itchy feeling.  Also has environmental allergies.  Using flonase.    Testosterone insufficiency  Pt reports he is almost out of Testosterone.  Will refill.  Pt asking for increase as typically using 3 sprays per day.  We reviewed his labs including normal PSA.      Psychiatric disorder  Pt reports [facility] is moving and a new Psychiatrist  He will be seeing in near future,     Continueds to take Clonazepam, Prozac, Depakote for his Bipolar Disorder,  Denies current depression or suicidal ideation.  He reports he is working out 6 days a week at the gym including both cardio and weightlifting.      Patient Active Problem List    Diagnosis Date Noted   • Viral URI 03/02/2018   •  "Shortness of breath 02/01/2018   • Skin irritation 01/02/2018   • Poor vision 11/13/2017   • Lump in chest 10/30/2017   • Neck discomfort 10/30/2017   • Rash 10/05/2017   • Obesity (BMI 35.0-39.9 without comorbidity) ([facility]) 10/05/2017   • Hyperlipidemia, mild 08/07/2017   • Acne vulgaris 08/07/2017   • Routine health maintenance 06/22/2017   • Psychiatric disorder 06/22/2017   • Testosterone insufficiency 06/22/2017   • History of Lawrence's esophagus 06/22/2017   • Gastroesophageal reflux disease with esophagitis 06/22/2017   • Deviated nasal septum 06/22/2017   • Snores 06/22/2017       Allergies:Pcn [penicillins] and Sulfa drugs    Medicines as of today:  Current Outpatient Prescriptions   Medication Sig Dispense Refill   • clonazePAM (KLONOPIN) 0.5 MG Tab TK 1 T PO  ONCE D PRF 14 DAYS  0   • fluoxetine (PROZAC) 40 MG capsule TK ONE C PO  ONCE D  0   • clotrimazole-betamethasone (LOTRISONE) 1-0.05 % Cream Apply 1/4 \" ribbon and spread across upper chest rash 1 Tube 1   • loratadine (CLARITIN) 10 MG Tab Take 1 Tab by mouth every day. 30 Tab 2   • Testosterone 20.25 MG/1.25GM (1.62%) Gel 3 pumps per day 90 g 0   • omeprazole (PRILOSEC) 20 MG delayed-release capsule TAKE 1 CAPSULE BY MOUTH EVERY DAY 30 Cap 2   • atorvastatin (LIPITOR) 10 MG Tab Take 1 Tab by mouth every day. 30 Tab 5   • divalproex (DEPAKOTE) 250 MG Tablet Delayed Response Take 250 mg by mouth 2 Times a Day.     • albuterol 108 (90 Base) MCG/ACT Aero Soln inhalation aerosol Inhale 2 Puffs by mouth every 6 hours as needed for Shortness of Breath. 8.5 g 2   • triamcinolone acetonide (KENALOG) 0.1 % Cream Apply 1- 2 x a day as needed for rash to neck 1 Tube 2   • fluticasone (FLONASE) 50 MCG/ACT nasal spray SPRAYS 2 SPRAYS IN EACH NOSTRIL QD  0   • tretinoin (RETIN-A) 0.1 % cream Apply very small amt to acne at night 45 g 0   • Testosterone (ANDROGEL PUMP) 20.25 MG/ACT (1.62%) Gel Apply 20.25 mg to affected area(s) every day for 30 days. 607.5 mg 0 " "    No current facility-administered medications for this visit.        Social History   Substance Use Topics   • Smoking status: Never Smoker   • Smokeless tobacco: Never Used   • Alcohol use No       Past Medical History:   Diagnosis Date   • Anxiety    • Bipolar affective disorder (HCC)    • Depression    • GERD (gastroesophageal reflux disease)        Family History   Problem Relation Age of Onset   • Arthritis Father    • Heart Disease Maternal Uncle    • Psychiatry Paternal Aunt    • Heart Disease Maternal Grandmother    • Lung Disease Maternal Grandfather    • Cancer Maternal Grandfather    • Cancer Paternal Grandfather        ROS:  Review of Systems   See HPI Above    Exam:  Blood pressure 116/80, pulse 86, temperature 37.2 °C (98.9 °F), resp. rate 18, height 1.905 m (6' 3\"), weight 106.1 kg (234 lb), SpO2 96 %. Body mass index is 29.25 kg/m².    General:  Well nourished, well developed male in NAD  HENT:Head is grossly normal. PERRL.  Neck: Supple. Trachea is midline. Slight red macular rash upper chest and lower neck. No drainage.  Pulmonary: Clear to ausculation .  Normal effort. No rales, ronchi, or wheezing.   Cardiovascular: Regular rate and rhythm.  Abdomen-Abdomen is soft, No tenderness.  Upper extremities- Good ROM  Lower extremities- neg for edema, redness, tenderness.  Neuro- A & O x 4. Speech clear and appropriate.    Current medications, allergies, and problem list reviewed with patient and updated in [facility] today.    Assessment/Plan:  1. Testosterone insufficiency  Testosterone 20.25 MG/1.25GM (1.62%) Gel    4 RX for monthly 90 gram Prescriptions for 3 sprays per day.               2. Rash  clotrimazole-betamethasone (LOTRISONE) 1-0.05 % Cream  Pt to try hypoallergenic detergent.    loratadine (CLARITIN) 10 MG Tab   3. Environmental and seasonal allergies  loratadine (CLARITIN) 10 MG Tab   4. Psychiatric disorder  Continue meds per Psychiatry, Keep appt w new Psychiatrist as planned.       Return " in about 3 months (around 9/5/2018).

## 2018-06-06 ENCOUNTER — APPOINTMENT (OUTPATIENT)
Dept: PHYSICAL THERAPY | Facility: REHABILITATION | Age: 48
End: 2018-06-06
Attending: NURSE PRACTITIONER
Payer: MEDICARE

## 2018-06-13 ENCOUNTER — APPOINTMENT (OUTPATIENT)
Dept: PHYSICAL THERAPY | Facility: REHABILITATION | Age: 48
End: 2018-06-13
Attending: NURSE PRACTITIONER
Payer: MEDICARE

## 2018-06-13 ENCOUNTER — TELEPHONE (OUTPATIENT)
Dept: MEDICAL GROUP | Facility: MEDICAL CENTER | Age: 48
End: 2018-06-13

## 2018-06-13 NOTE — TELEPHONE ENCOUNTER
DOCUMENTATION OF PAR STATUS:    1. Name of Medication & Dose: Tretinoin 1%     2. Name of Prescription Coverage Company & phone #: Medicare/ [employer] plan [phone] FAX: [phone]    3. Date Prior Auth Submitted: 6/13/18    4. What information was given to obtain insurance decision? Office notes, dx, med hx     5. Prior Auth Status? Pending    6. Patient Notified: yes

## 2018-06-14 NOTE — TELEPHONE ENCOUNTER
FINAL PRIOR AUTHORIZATION STATUS:    1.  Name of Medication & Dose: Tretinoin      2. Prior Auth Status: Approved through 12/31/2018     3. Action Taken: Pharmacy Notified: yes Patient Notified: yes

## 2018-06-18 ENCOUNTER — PHYSICAL THERAPY (OUTPATIENT)
Dept: PHYSICAL THERAPY | Facility: REHABILITATION | Age: 48
End: 2018-06-18
Attending: NURSE PRACTITIONER
Payer: MEDICARE

## 2018-06-18 DIAGNOSIS — M54.2 NECK PAIN: ICD-10-CM

## 2018-06-18 DIAGNOSIS — M54.2 NECK DISCOMFORT: ICD-10-CM

## 2018-06-18 DIAGNOSIS — M54.2 CERVICALGIA: ICD-10-CM

## 2018-06-18 PROCEDURE — 97014 ELECTRIC STIMULATION THERAPY: CPT

## 2018-06-18 PROCEDURE — 97140 MANUAL THERAPY 1/> REGIONS: CPT

## 2018-06-18 PROCEDURE — 97110 THERAPEUTIC EXERCISES: CPT

## 2018-06-18 NOTE — OP THERAPY DAILY TREATMENT
Outpatient Physical Therapy  DAILY TREATMENT     [facility] Physical [address].  [address]  [place] [address]  Phone:  [phone]  Fax:  [phone]    Date: 06/18/2018    Patient: Jared Rg  YOB: 1970  MRN: [id number]     Time Calculation  Start time: 0800  Stop time: 0845 Time Calculation (min): 45 minutes     Chief Complaint: Neck pain   Visit #: 3    SUBJECTIVE:  Patient has been going to the gym 6x a week, reports that he has been working on form and neck position    OBJECTIVE:  Current objective measures:   Symmetrical 90 degrees           Therapeutic Exercises (CPT 61324):     1. Foam roller , pec stretch 2 minutes     2. Foam roller , horinzontal thoracic extension and self-mobilization     3. Quadruped, thoracic rotation , x 5 each cueing for head position, (HEP)    4. Quadruped , intiating alternating LE extension progressing to bird/dog with 10 second hold. Patient has increased difficulty with balance with L UE/R LE combination     5. Bent over Rows , reviewed patient home exercises and facilitation to decreased thoracic flexion and excessive cervical extension , 2 x 10 with 10 lb weights     Therapeutic Treatments and Modalities:     1. Manual Therapy (CPT 87521), thoracic PA central T4-T8 grade III/IV , cervical rotational mobilization C3-C7 grade III/IV as tolerated , sub occipital stretch 3 x 30 seconds     4. E Stim Unattended (CPT 36805), lower cervical/ upper thoracic IFC and heat, 15 minutes     Time-based treatments/modalities:  Manual therapy minutes (CPT 98807): 10 minutes  Therapeutic exercise minutes (CPT 44551): 20 minutes       Pain rating before treatment: 1  Pain rating after treatment: 1    ASSESSMENT:   Response to treatment: Patient is improving with postural mechanics within PT activity, but is unable to sustain. Patient will continue to benefit from scapular/cervical stabilization.     PLAN/RECOMMENDATIONS:   Plan for  treatment: therapy treatment to continue next visit. Progressing quadruped/scapular stabilization positions as tolerated.   Planned interventions for next visit: continue with current treatment.

## 2018-06-20 ENCOUNTER — APPOINTMENT (OUTPATIENT)
Dept: PHYSICAL THERAPY | Facility: REHABILITATION | Age: 48
End: 2018-06-20
Attending: NURSE PRACTITIONER
Payer: MEDICARE

## 2018-06-25 ENCOUNTER — APPOINTMENT (OUTPATIENT)
Dept: PHYSICAL THERAPY | Facility: REHABILITATION | Age: 48
End: 2018-06-25
Attending: NURSE PRACTITIONER
Payer: MEDICARE

## 2018-06-30 ENCOUNTER — OFFICE VISIT (OUTPATIENT)
Dept: URGENT CARE | Facility: PHYSICIAN GROUP | Age: 48
End: 2018-06-30
Payer: MEDICARE

## 2018-06-30 VITALS
OXYGEN SATURATION: 97 % | SYSTOLIC BLOOD PRESSURE: 122 MMHG | WEIGHT: 234 LBS | HEART RATE: 71 BPM | BODY MASS INDEX: 29.09 KG/M2 | TEMPERATURE: 97.8 F | DIASTOLIC BLOOD PRESSURE: 70 MMHG | HEIGHT: 75 IN | RESPIRATION RATE: 16 BRPM

## 2018-06-30 DIAGNOSIS — R42 DIZZINESS: ICD-10-CM

## 2018-06-30 DIAGNOSIS — H81.10 BENIGN PAROXYSMAL POSITIONAL VERTIGO, UNSPECIFIED LATERALITY: ICD-10-CM

## 2018-06-30 DIAGNOSIS — R09.81 SINUS CONGESTION: ICD-10-CM

## 2018-06-30 DIAGNOSIS — R11.0 NAUSEA: ICD-10-CM

## 2018-06-30 DIAGNOSIS — H69.90 EUSTACHIAN TUBE DYSFUNCTION, UNSPECIFIED LATERALITY: ICD-10-CM

## 2018-06-30 PROCEDURE — 99214 OFFICE O/P EST MOD 30 MIN: CPT | Performed by: NURSE PRACTITIONER

## 2018-06-30 RX ORDER — METHYLPREDNISOLONE 4 MG/1
TABLET ORAL
Qty: 1 KIT | Refills: 0 | Status: SHIPPED | OUTPATIENT
Start: 2018-06-30 | End: 2018-10-24

## 2018-06-30 RX ORDER — MECLIZINE HYDROCHLORIDE 25 MG/1
25 TABLET ORAL 3 TIMES DAILY PRN
Qty: 30 TAB | Refills: 0 | Status: SHIPPED | OUTPATIENT
Start: 2018-06-30 | End: 2018-10-24

## 2018-06-30 RX ORDER — ONDANSETRON 4 MG/1
4 TABLET, ORALLY DISINTEGRATING ORAL EVERY 6 HOURS PRN
Qty: 10 TAB | Refills: 0 | Status: SHIPPED | OUTPATIENT
Start: 2018-06-30 | End: 2018-10-24

## 2018-06-30 ASSESSMENT — ENCOUNTER SYMPTOMS
PHOTOPHOBIA: 0
FOCAL WEAKNESS: 0
SHORTNESS OF BREATH: 0
NAUSEA: 1
DOUBLE VISION: 0
MYALGIAS: 0
WEAKNESS: 0
ABDOMINAL PAIN: 0
EYE PAIN: 0
WHEEZING: 0
SENSORY CHANGE: 0
SORE THROAT: 0
ORTHOPNEA: 0
HEADACHES: 0
EYE DISCHARGE: 0
FEVER: 0
SEIZURES: 0
TREMORS: 0
VOMITING: 0
SINUS PAIN: 0
BLURRED VISION: 0
CHILLS: 0
PALPITATIONS: 0
DIZZINESS: 1
EYE REDNESS: 0
TINGLING: 0
NECK PAIN: 0

## 2018-06-30 NOTE — PROGRESS NOTES
"Subjective:      Jared Rg is a 47 y.o. male who presents with Dizziness (nausea, lightheadedness x 1 day)            HPI  Jared is here for dizziness this morning when he woke up. States stood up and fell back due to \"the room spinning\". Denies hitting head. C/o \"loss of balance\" with walking[place] Denies seasonal allergies. C/o some nasal congestion, \"foggy head\", fatigue, ear pressure. Denies facial pressure, but has slight PND and dry scratchy throat. States had some changes in the dosages of his Depakote and Klonopin 3 days ago. Denies HA, confusion, seizures, tremors. Has nausea with spinning but no vomiting. Does not feel confused. Significant other present.    PMH:  has a past medical history of Anxiety; Bipolar affective disorder (HCC); Depression; and GERD (gastroesophageal reflux disease).  MEDS:   Current Outpatient Prescriptions:   •  clonazePAM (KLONOPIN) 0.5 MG Tab, TK 1 T PO  ONCE D PRF 14 DAYS, Disp: , Rfl: 0  •  fluoxetine (PROZAC) 40 MG capsule, TK ONE C PO  ONCE D, Disp: , Rfl: 0  •  omeprazole (PRILOSEC) 20 MG delayed-release capsule, TAKE 1 CAPSULE BY MOUTH EVERY DAY, Disp: 30 Cap, Rfl: 2  •  atorvastatin (LIPITOR) 10 MG Tab, Take 1 Tab by mouth every day., Disp: 30 Tab, Rfl: 5  •  divalproex (DEPAKOTE) 250 MG Tablet Delayed Response, Take 250 mg by mouth 2 Times a Day., Disp: , Rfl:   •  albuterol 108 (90 Base) MCG/ACT Aero Soln inhalation aerosol, Inhale 2 Puffs by mouth every 6 hours as needed for Shortness of Breath., Disp: 8.5 g, Rfl: 2  •  triamcinolone acetonide (KENALOG) 0.1 % Cream, Apply 1- 2 x a day as needed for rash to neck, Disp: 1 Tube, Rfl: 2  •  fluticasone (FLONASE) 50 MCG/ACT nasal spray, SPRAYS 2 SPRAYS IN EACH NOSTRIL QD, Disp: , Rfl: 0  •  tretinoin (RETIN-A) 0.1 % cream, Apply very small amt to acne at night, Disp: 45 g, Rfl: 0  •  clotrimazole-betamethasone (LOTRISONE) 1-0.05 % Cream, Apply 1/4 \" ribbon and spread across upper chest " "rash, Disp: 1 Tube, Rfl: 1  •  loratadine (CLARITIN) 10 MG Tab, Take 1 Tab by mouth every day., Disp: 30 Tab, Rfl: 2  •  Testosterone 20.25 MG/1.25GM (1.62%) Gel, 3 pumps per day, Disp: 90 g, Rfl: 0  •  Testosterone (ANDROGEL PUMP) 20.25 MG/ACT (1.62%) Gel, Apply 20.25 mg to affected area(s) every day for 30 days., Disp: 607.5 mg, Rfl: 0  ALLERGIES:   Allergies   Allergen Reactions   • Pcn [Penicillins] Hives   • Sulfa Drugs Hives     SURGHX:   Past Surgical History:   Procedure Laterality Date   • HERNIA REPAIR  1982   • ABDOMINAL EXPLORATION       SOCHX:  reports that he has never smoked. He has never used smokeless tobacco. He reports that he does not drink alcohol or use drugs.  FH: Family history was reviewed, no pertinent findings to report    Review of Systems   Constitutional: Positive for malaise/fatigue. Negative for chills and fever.   HENT: Positive for congestion. Negative for ear pain, sinus pain and sore throat.    Eyes: Negative for blurred vision, double vision, photophobia, pain, discharge and redness.   Respiratory: Negative for shortness of breath and wheezing.    Cardiovascular: Negative for chest pain, palpitations and orthopnea.   Gastrointestinal: Positive for nausea. Negative for abdominal pain and vomiting.   Musculoskeletal: Negative for myalgias and neck pain.   Neurological: Positive for dizziness. Negative for tingling, tremors, sensory change, focal weakness, seizures, weakness and headaches.   All other systems reviewed and are negative.         Objective:     /70   Pulse 71   Temp 36.6 °C (97.8 °F)   Resp 16   Ht 1.905 m (6' 3\")   Wt 106.1 kg (234 lb)   SpO2 97%   BMI 29.25 kg/m²      Physical Exam   Constitutional: He is oriented to person, place, and time. Vital signs are normal. He appears well-developed and well-nourished. He is active and cooperative.  Non-toxic appearance. He does not have a sickly appearance. He does not appear ill. No distress.   HENT:   Head: " Normocephalic.   Right Ear: External ear and ear canal normal. Tympanic membrane is injected.   Left Ear: External ear and ear canal normal. Tympanic membrane is injected.   Nose: Mucosal edema present. No rhinorrhea or sinus tenderness.   Mouth/Throat: Uvula is midline. Mucous membranes are dry. No uvula swelling. No posterior oropharyngeal edema or posterior oropharyngeal erythema.   Eyes: Conjunctivae and EOM are normal. Pupils are equal, round, and reactive to light.   Neck: Normal range of motion. Neck supple.   Cardiovascular: Normal rate, regular rhythm and normal heart sounds.    Pulmonary/Chest: Effort normal and breath sounds normal. No accessory muscle usage. No respiratory distress.   Abdominal: Soft. Bowel sounds are normal. He exhibits no distension. There is no tenderness. There is no rigidity, no rebound, no guarding and no CVA tenderness.   Musculoskeletal: Normal range of motion.   Neurological: He is alert and oriented to person, place, and time. He has normal strength. No cranial nerve deficit or sensory deficit. Coordination and gait normal. GCS eye subscore is 4. GCS verbal subscore is 5. GCS motor subscore is 6.   Skin: Skin is warm and dry. He is not diaphoretic.   Psychiatric: His speech is normal and behavior is normal. Judgment and thought content normal. His mood appears anxious. He is not actively hallucinating. Cognition and memory are normal. He is attentive.   Vitals reviewed.              Assessment/Plan:     1. Dizziness    - meclizine (ANTIVERT) 25 MG Tab; Take 1 Tab by mouth 3 times a day as needed.  Dispense: 30 Tab; Refill: 0    2. Nausea    - ondansetron (ZOFRAN ODT) 4 MG TABLET DISPERSIBLE; Take 1 Tab by mouth every 6 hours as needed for Nausea.  Dispense: 10 Tab; Refill: 0    3. Sinus congestion    - MethylPREDNISolone (MEDROL DOSEPAK) 4 MG Tablet Therapy Pack; Use as directed  Dispense: 1 Kit; Refill: 0    4. Eustachian tube dysfunction, unspecified laterality    5. Benign  paroxysmal positional vertigo, unspecified laterality          Use Zofran prn for nausea  Increase water intake  Get rest  May use Ibuprofen/Tylenol prn for any fever, body aches or ear pain  May take Claritin/Allegra or Zyrtec for seasonal allergy symptoms prn, if any  May use flonase/saline nasal congestion prn for nasal congestion  Monitor for increased pain in ear, muffled hearing, ear discharge, fever-recheck  Monitor for continued dizziness with n/v,severe HA, vision change, weakness, speech change, confusion - need to call 911

## 2018-07-03 ENCOUNTER — TELEPHONE (OUTPATIENT)
Dept: PHYSICAL THERAPY | Facility: REHABILITATION | Age: 48
End: 2018-07-03

## 2018-07-03 NOTE — OP THERAPY DISCHARGE SUMMARY
Outpatient Physical Therapy  DISCHARGE SUMMARY NOTE      [facility]  [address]  [address]  [place] [address]  Phone:  [phone]  Fax:  [phone]    Date of Visit: 07/03/2018    Patient: Jared Rg  YOB: 1970  MRN: [id number]     Referring Provider:  CHESTER Lombardi  [address]  [address]  [place] [address]        Referring Diagnosis Cervicalgia [M54.2]         Functional Limitation G-Codes and Severity Modifiers      Goal:   : CI    Discharge:   n/a, patient did not return to PT        Your patient is being discharged from Physical Therapy with the following comments:   · Patient cancelled or missed more than 2 scheduled appointments/non-compliant    Comments:  Patient was seen for 3 PT visits from 05/16/18 to 06/18/18. Unfortunately, patient late cxl and no showed 3 appointments.      Limitations Remaining:  At last visit, patient reported ability to go to the gym 6x a week. Unknown on current limitations     Recommendations:  D/c from PT services.     Kourtney Smith, PT, DPT    Date: 7/3/2018

## 2018-07-04 ENCOUNTER — APPOINTMENT (OUTPATIENT)
Dept: PHYSICAL THERAPY | Facility: REHABILITATION | Age: 48
End: 2018-07-04
Attending: NURSE PRACTITIONER
Payer: MEDICARE

## 2018-07-07 DIAGNOSIS — E29.1 TESTOSTERONE DEFICIENCY IN MALE: ICD-10-CM

## 2018-07-09 RX ORDER — TESTOSTERONE 16.2 MG/G
GEL TRANSDERMAL
Qty: 1 BOTTLE | Refills: 3 | Status: SHIPPED | OUTPATIENT
Start: 2018-07-09 | End: 2018-07-16 | Stop reason: SDUPTHER

## 2018-07-09 NOTE — TELEPHONE ENCOUNTER
Rx faxed to:    [id number] - [place], [place] - [address] AT [facility] [place] [facility] [address]  [address] [address]  Phone: [phone] Fax: [phone]

## 2018-07-11 ENCOUNTER — APPOINTMENT (OUTPATIENT)
Dept: PHYSICAL THERAPY | Facility: REHABILITATION | Age: 48
End: 2018-07-11
Attending: NURSE PRACTITIONER
Payer: MEDICARE

## 2018-07-16 DIAGNOSIS — E29.1 TESTOSTERONE DEFICIENCY IN MALE: ICD-10-CM

## 2018-07-16 RX ORDER — TESTOSTERONE 16.2 MG/G
GEL TRANSDERMAL
Qty: 1 BOTTLE | Refills: 3 | Status: SHIPPED | OUTPATIENT
Start: 2018-07-16 | End: 2018-07-18 | Stop reason: SDUPTHER

## 2018-07-16 NOTE — TELEPHONE ENCOUNTER
1. Caller Name:Jared Cliffordgarethaliciakelby                                          Call Back Number: [phone] (home)         Patient approves a detailed voicemail message: yes    NOTE TO MA     PATIENT CALLED STATING THE MEDICATION TESTERONE ISN'T SENT TO HIS PHARMACY [facility] WANTS TO KNOW WHAT IS GOING ON I LET HIM KNOW YOU FAXED IT OVER. PATIENT STILL WANTS US TO CHECK OR RESEND IT.

## 2018-07-18 DIAGNOSIS — E29.1 TESTOSTERONE DEFICIENCY IN MALE: ICD-10-CM

## 2018-07-18 RX ORDER — TESTOSTERONE 16.2 MG/G
GEL TRANSDERMAL
Qty: 1 BOTTLE | Refills: 0 | Status: SHIPPED | OUTPATIENT
Start: 2018-07-18 | End: 2018-08-16 | Stop reason: SDUPTHER

## 2018-07-18 NOTE — TELEPHONE ENCOUNTER
Patient is claiming that pharmacy cannot accept electronic requests for this medication. He is almost out.

## 2018-07-18 NOTE — TELEPHONE ENCOUNTER
Called patient to notify him of Rx. Patient requested we mail Rx to him. Rx put in outgoing mail.

## 2018-07-25 ENCOUNTER — TELEPHONE (OUTPATIENT)
Dept: MEDICAL GROUP | Facility: MEDICAL CENTER | Age: 48
End: 2018-07-25

## 2018-07-25 NOTE — TELEPHONE ENCOUNTER
DOCUMENTATION OF PAR STATUS:    1. Name of Medication & Dose: Androgel Pump      2. Name of Prescription Coverage Company & phone #: Medicare/[employer] [phone]    3. Date Prior Auth Submitted: 7/25/18    4. What information was given to obtain insurance decision? Office notes, Dx, Med Hx     5. Prior Auth Status? Pending    6. Patient Notified: yes

## 2018-07-26 NOTE — TELEPHONE ENCOUNTER
FINAL PRIOR AUTHORIZATION STATUS:    1.  Name of Medication & Dose: Androgel Pump     2. Prior Auth Status: Approved through 12/31/18     3. Action Taken: Pharmacy Notified: yes Patient Notified: yes

## 2018-08-07 ENCOUNTER — HOSPITAL ENCOUNTER (OUTPATIENT)
Dept: RADIOLOGY | Facility: MEDICAL CENTER | Age: 48
End: 2018-08-07
Attending: OTOLARYNGOLOGY
Payer: MEDICARE

## 2018-08-07 DIAGNOSIS — R51.9 NONINTRACTABLE HEADACHE, UNSPECIFIED CHRONICITY PATTERN, UNSPECIFIED HEADACHE TYPE: ICD-10-CM

## 2018-08-07 DIAGNOSIS — R42 DIZZINESS AND GIDDINESS: ICD-10-CM

## 2018-08-07 PROCEDURE — 70480 CT ORBIT/EAR/FOSSA W/O DYE: CPT

## 2018-08-14 DIAGNOSIS — E29.1 TESTOSTERONE DEFICIENCY IN MALE: ICD-10-CM

## 2018-08-14 RX ORDER — TESTOSTERONE 16.2 MG/G
GEL TRANSDERMAL
Qty: 75 BOTTLE | Refills: 0 | OUTPATIENT
Start: 2018-08-14 | End: 2018-09-13

## 2018-08-16 DIAGNOSIS — E29.1 TESTOSTERONE DEFICIENCY IN MALE: ICD-10-CM

## 2018-08-16 RX ORDER — TESTOSTERONE 16.2 MG/G
GEL TRANSDERMAL
Qty: 1 BOTTLE | Refills: 0 | Status: SHIPPED | OUTPATIENT
Start: 2018-08-16 | End: 2018-08-23 | Stop reason: SDUPTHER

## 2018-08-21 DIAGNOSIS — L70.0 ACNE VULGARIS: ICD-10-CM

## 2018-08-21 RX ORDER — TRETINOIN 1 MG/G
CREAM TOPICAL
Qty: 45 G | Refills: 0 | Status: SHIPPED | OUTPATIENT
Start: 2018-08-21 | End: 2018-10-06 | Stop reason: SDUPTHER

## 2018-08-23 ENCOUNTER — OFFICE VISIT (OUTPATIENT)
Dept: MEDICAL GROUP | Facility: MEDICAL CENTER | Age: 48
End: 2018-08-23
Attending: NURSE PRACTITIONER
Payer: MEDICARE

## 2018-08-23 VITALS
HEART RATE: 83 BPM | OXYGEN SATURATION: 95 % | BODY MASS INDEX: 28.49 KG/M2 | HEIGHT: 75 IN | WEIGHT: 229.1 LBS | DIASTOLIC BLOOD PRESSURE: 72 MMHG | TEMPERATURE: 98.6 F | RESPIRATION RATE: 18 BRPM | SYSTOLIC BLOOD PRESSURE: 120 MMHG

## 2018-08-23 DIAGNOSIS — N52.8 OTHER MALE ERECTILE DYSFUNCTION: ICD-10-CM

## 2018-08-23 DIAGNOSIS — M54.6 ACUTE LEFT-SIDED THORACIC BACK PAIN: ICD-10-CM

## 2018-08-23 DIAGNOSIS — E29.1 TESTOSTERONE DEFICIENCY IN MALE: ICD-10-CM

## 2018-08-23 DIAGNOSIS — R21 RASH: ICD-10-CM

## 2018-08-23 DIAGNOSIS — E34.9 TESTOSTERONE INSUFFICIENCY: ICD-10-CM

## 2018-08-23 PROCEDURE — 99214 OFFICE O/P EST MOD 30 MIN: CPT | Performed by: NURSE PRACTITIONER

## 2018-08-23 PROCEDURE — 99212 OFFICE O/P EST SF 10 MIN: CPT | Performed by: NURSE PRACTITIONER

## 2018-08-23 RX ORDER — CARBAMAZEPINE 100 MG/1
TABLET, EXTENDED RELEASE ORAL
Refills: 0 | COMMUNITY
Start: 2018-08-14 | End: 2018-10-24

## 2018-08-23 RX ORDER — CLOTRIMAZOLE AND BETAMETHASONE DIPROPIONATE 10; .64 MG/G; MG/G
CREAM TOPICAL
Qty: 1 TUBE | Refills: 1 | Status: SHIPPED | OUTPATIENT
Start: 2018-08-23 | End: 2018-10-24

## 2018-08-23 RX ORDER — TESTOSTERONE 16.2 MG/G
GEL TRANSDERMAL
Qty: 1 BOTTLE | Refills: 0 | Status: SHIPPED | OUTPATIENT
Start: 2018-10-23 | End: 2018-11-28 | Stop reason: SDUPTHER

## 2018-08-23 RX ORDER — SILDENAFIL CITRATE 20 MG/1
20 TABLET ORAL
Qty: 30 TAB | Refills: 0 | Status: SHIPPED | OUTPATIENT
Start: 2018-08-23 | End: 2019-07-24

## 2018-08-23 RX ORDER — BACLOFEN 10 MG/1
10 TABLET ORAL 3 TIMES DAILY
Qty: 90 TAB | Refills: 0 | Status: SHIPPED | OUTPATIENT
Start: 2018-08-23 | End: 2018-10-24

## 2018-08-23 RX ORDER — TESTOSTERONE 16.2 MG/G
GEL TRANSDERMAL
Qty: 1 BOTTLE | Refills: 0 | Status: SHIPPED | OUTPATIENT
Start: 2018-09-23 | End: 2018-11-28 | Stop reason: SDUPTHER

## 2018-08-23 RX ORDER — TESTOSTERONE 16.2 MG/G
GEL TRANSDERMAL
Qty: 1 BOTTLE | Refills: 0 | Status: SHIPPED | OUTPATIENT
Start: 2018-08-23 | End: 2018-11-28 | Stop reason: SDUPTHER

## 2018-08-23 RX ORDER — CLONAZEPAM 1 MG/1
TABLET ORAL
COMMUNITY
Start: 2018-08-21 | End: 2018-10-24

## 2018-08-23 NOTE — PROGRESS NOTES
"Chief Complaint:   Chief Complaint   Patient presents with   • Back Pain   • Medication Refill       HPI:  Jared is here today for a follow-up on testosterone deficiency, request for med refills, thoracic back pain.    Nev  Report:    7/26/18 Androgel 1.62 % gel 75 by me  7/24/18 Clonazepam 0.5 # 14 by Cristina Duran   Androgel 1.62 % gel 75 by me  26 RX and 3 Presribers.  ------------------------------------------        His past medical history includes:  Acne  Anxiety, Depression, Bipolar Disorder  Testosterone Use/Low Testosterone  GERD  Lawrence's Esophagus hx  Hernia Repair  Hyperlipidemia, mild ( new)  Nasal Septum Deviation with Mucosa thickening ( new)  Sleep apnea on CPAP mask  Chronic Neck Pain     Established with  Psychiatry- Isra Kate ( in Dr Champagne office)     Referrals Approved:  [facility] for Sleep Study  Dr Samayoa for deviated nasal septum ( seen 7/3/17)  GI consultants myla Martinez, Hx of Lawrence's Esophagus  Dermatology- Riley Cruz        REview of REcords:  5/16 and 5/23 Physical Therapy appt's  5/14/18 Annual Wellness visit, testosterone refill.   3/26/18 Teleohone Encounter for refill of Testosterone.  3/5/18 Urgent Care Visit for Cough, SOB, Neg Strep Test, Neg Flu test. RX for Tamiflu just in case       Testosterone insufficiency  Pt here for refill of Testosterone.  Reports his energy level is good when taking the injections.  Denies concerns.      Acute left-sided thoracic back pain  One week of left thoracic back pain  Believes twisted when getting off couch.  Take ASA and helped slightly.  Pain is \"knot\"     Rash  Hx of rash to chest and non itchy  States gets the rash and in past and cream treated it well.    Other male erectile dysfunction  Pt reports since on Tegretol has had erectile dysfunction  Wants trial of viagra or equivalent.      Patient Active Problem List    Diagnosis Date Noted   • Acute left-sided thoracic back pain 08/23/2018   • Other male erectile " "dysfunction 08/23/2018   • Viral URI 03/02/2018   • Shortness of breath 02/01/2018   • Skin irritation 01/02/2018   • Poor vision 11/13/2017   • Lump in chest 10/30/2017   • Neck discomfort 10/30/2017   • Rash 10/05/2017   • Obesity (BMI 35.0-39.9 without comorbidity) ([facility]) 10/05/2017   • Hyperlipidemia, mild 08/07/2017   • Acne vulgaris 08/07/2017   • Routine health maintenance 06/22/2017   • Psychiatric disorder 06/22/2017   • Testosterone insufficiency 06/22/2017   • History of Lawrence's esophagus 06/22/2017   • Gastroesophageal reflux disease with esophagitis 06/22/2017   • Deviated nasal septum 06/22/2017   • Snores 06/22/2017       Allergies:Pcn [penicillins] and Sulfa drugs    Medicines as of today:  Current Outpatient Prescriptions   Medication Sig Dispense Refill   • Testosterone (ANDROGEL PUMP) 20.25 MG/ACT (1.62%) Gel APPLY 3 PUMP DEPRESSIONS TOPICALLY TO THE UPPER SHOULDER OR ARM ONCE DAILY 1 Bottle 0   • [START ON 9/23/2018] Testosterone (ANDROGEL) 20.25 MG/ACT (1.62%) Gel 3 pumps daily 1 Bottle 0   • [START ON 10/23/2018] Testosterone (ANDROGEL) 20.25 MG/ACT (1.62%) Gel 3 pumps daily 1 Bottle 0   • clotrimazole-betamethasone (LOTRISONE) 1-0.05 % Cream Apply 1/4 \" ribbon and spread across upper chest rash 1 Tube 1   • sildenafil (REVATIO) 20 MG tablet Take 1 Tab by mouth 1 time daily as needed. 30 Tab 0   • baclofen (LIORESAL) 10 MG Tab Take 1 Tab by mouth 3 times a day. 90 Tab 0   • carBAMazepine SR (TEGRETOL XR) 100 MG TABLET SR 12 HR TK 1 T PO ONCE D HS  0   • clonazePAM (KLONOPIN) 1 MG Tab      • tretinoin (RETIN-A) 0.1 % cream APPLY A SMALL AMOUNT TO ACNE EVERY NIGHT AT BEDTIME 45 g 0   • meclizine (ANTIVERT) 25 MG Tab Take 1 Tab by mouth 3 times a day as needed. 30 Tab 0   • ondansetron (ZOFRAN ODT) 4 MG TABLET DISPERSIBLE Take 1 Tab by mouth every 6 hours as needed for Nausea. 10 Tab 0   • MethylPREDNISolone (MEDROL DOSEPAK) 4 MG Tablet Therapy Pack Use as directed 1 Kit 0   • clonazePAM " "(KLONOPIN) 0.5 MG Tab TK 1 T PO  ONCE D PRF 14 DAYS  0   • loratadine (CLARITIN) 10 MG Tab Take 1 Tab by mouth every day. 30 Tab 2   • omeprazole (PRILOSEC) 20 MG delayed-release capsule TAKE 1 CAPSULE BY MOUTH EVERY DAY 30 Cap 2   • atorvastatin (LIPITOR) 10 MG Tab Take 1 Tab by mouth every day. 30 Tab 5   • divalproex (DEPAKOTE) 250 MG Tablet Delayed Response Take 250 mg by mouth 2 Times a Day.     • albuterol 108 (90 Base) MCG/ACT Aero Soln inhalation aerosol Inhale 2 Puffs by mouth every 6 hours as needed for Shortness of Breath. 8.5 g 2   • triamcinolone acetonide (KENALOG) 0.1 % Cream Apply 1- 2 x a day as needed for rash to neck 1 Tube 2   • fluticasone (FLONASE) 50 MCG/ACT nasal spray SPRAYS 2 SPRAYS IN EACH NOSTRIL QD  0     No current facility-administered medications for this visit.        Social History   Substance Use Topics   • Smoking status: Never Smoker   • Smokeless tobacco: Never Used   • Alcohol use No       Past Medical History:   Diagnosis Date   • Anxiety    • Bipolar affective disorder (HCC)    • Depression    • GERD (gastroesophageal reflux disease)        Family History   Problem Relation Age of Onset   • Arthritis Father    • Heart Disease Maternal Uncle    • Psychiatry Paternal Aunt    • Heart Disease Maternal Grandmother    • Lung Disease Maternal Grandfather    • Cancer Maternal Grandfather    • Cancer Paternal Grandfather        ROS:  Review of Systems   See HPI Above    Exam:  Blood pressure 120/72, pulse 83, temperature 37 °C (98.6 °F), resp. rate 18, height 1.905 m (6' 3\"), weight 103.9 kg (229 lb 1.6 oz), SpO2 95 %. Body mass index is 28.64 kg/m².    General:  Well nourished, well developed male in NAD  HENT:Head is grossly normal. PERRL.  Neck: Supple. Trachea is midline.  Pulmonary: Clear to ausculation .  Normal effort. No rales, ronchi, or wheezing.   Cardiovascular: Regular rate and rhythm.  Abdomen-Abdomen is soft, No tenderness.  Back- mild tenderness to left paraspinal " muscles of thoracic area. No spinal tenderness.  Upper extremities- Strong = . Good ROM  Lower extremities- neg for edema, redness, tenderness.  Neuro- A & O x 4. Speech clear and appropriate.    Current medications, allergies, and problem list reviewed with patient and updated in [facility] today.    Assessment/Plan:  1. Testosterone insufficiency  See below   2. Testosterone deficiency in male  Testosterone (ANDROGEL PUMP) 20.25 MG/ACT (1.62%) Gel aug-sept    Testosterone (ANDROGEL) 20.25 MG/ACT (1.62%) Gel sept-oct    Testosterone (ANDROGEL) 20.25 MG/ACT (1.62%) Gel oct- nov   3. Acute left-sided thoracic back pain  baclofen (LIORESAL) 10 MG Tab    REFERRAL TO PHYSICAL THERAPY Reason for Therapy: Eval/Treat/Report   4. Rash  clotrimazole-betamethasone (LOTRISONE) 1-0.05 % Cream   5. Other male erectile dysfunction  sildenafil (REVATIO) 20 MG tablet  Good RX info to patient. F/u Urologist prn       Return in about 6 weeks (around 10/4/2018).

## 2018-08-23 NOTE — ASSESSMENT & PLAN NOTE
"One week of left thoracic back pain  Believes twisted when getting off couch.  Take ASA and helped slightly.  Pain is \"knot\"   "

## 2018-08-23 NOTE — ASSESSMENT & PLAN NOTE
Pt here for refill of Testosterone.  Reports his energy level is good when taking the injections.  Denies concerns.

## 2018-10-06 DIAGNOSIS — Z87.19 HISTORY OF BARRETT'S ESOPHAGUS: ICD-10-CM

## 2018-10-06 DIAGNOSIS — L70.0 ACNE VULGARIS: ICD-10-CM

## 2018-10-06 DIAGNOSIS — K21.00 GASTROESOPHAGEAL REFLUX DISEASE WITH ESOPHAGITIS: ICD-10-CM

## 2018-10-08 RX ORDER — OMEPRAZOLE 20 MG/1
CAPSULE, DELAYED RELEASE ORAL
Qty: 30 CAP | Refills: 0 | Status: SHIPPED | OUTPATIENT
Start: 2018-10-08 | End: 2019-07-24

## 2018-10-08 RX ORDER — TRETINOIN 1 MG/G
CREAM TOPICAL
Qty: 45 G | Refills: 0 | Status: SHIPPED | OUTPATIENT
Start: 2018-10-08 | End: 2019-02-13 | Stop reason: SDUPTHER

## 2018-10-24 ENCOUNTER — OFFICE VISIT (OUTPATIENT)
Dept: MEDICAL GROUP | Facility: MEDICAL CENTER | Age: 48
End: 2018-10-24
Attending: NURSE PRACTITIONER
Payer: MEDICARE

## 2018-10-24 VITALS
OXYGEN SATURATION: 98 % | SYSTOLIC BLOOD PRESSURE: 118 MMHG | BODY MASS INDEX: 29.59 KG/M2 | HEART RATE: 88 BPM | RESPIRATION RATE: 20 BRPM | TEMPERATURE: 98.5 F | HEIGHT: 75 IN | WEIGHT: 238 LBS | DIASTOLIC BLOOD PRESSURE: 80 MMHG

## 2018-10-24 DIAGNOSIS — R21 RASH: ICD-10-CM

## 2018-10-24 DIAGNOSIS — F99 PSYCHIATRIC DISORDER: ICD-10-CM

## 2018-10-24 DIAGNOSIS — K21.00 GASTROESOPHAGEAL REFLUX DISEASE WITH ESOPHAGITIS: ICD-10-CM

## 2018-10-24 DIAGNOSIS — E34.9 TESTOSTERONE INSUFFICIENCY: ICD-10-CM

## 2018-10-24 DIAGNOSIS — L55.9 SUNBURN: ICD-10-CM

## 2018-10-24 PROCEDURE — 99213 OFFICE O/P EST LOW 20 MIN: CPT | Performed by: NURSE PRACTITIONER

## 2018-10-24 PROCEDURE — 99214 OFFICE O/P EST MOD 30 MIN: CPT | Performed by: NURSE PRACTITIONER

## 2018-10-24 RX ORDER — TRIAMCINOLONE ACETONIDE 1 MG/G
OINTMENT TOPICAL
Qty: 1 TUBE | Refills: 2 | Status: SHIPPED | OUTPATIENT
Start: 2018-10-24 | End: 2018-11-28

## 2018-10-24 RX ORDER — CICLOPIROX 80 MG/ML
SOLUTION TOPICAL
Refills: 4 | COMMUNITY
Start: 2018-09-26 | End: 2018-10-24

## 2018-10-24 RX ORDER — BUPROPION HYDROCHLORIDE 150 MG/1
TABLET ORAL
Refills: 0 | COMMUNITY
Start: 2018-09-24 | End: 2018-10-24

## 2018-10-24 RX ORDER — CLINDAMYCIN PHOSPHATE 10 MG/G
GEL TOPICAL
COMMUNITY
Start: 2018-10-22 | End: 2019-07-24

## 2018-10-24 RX ORDER — SUCRALFATE 1 G/1
1 TABLET ORAL
Qty: 56 TAB | Refills: 0 | Status: SHIPPED | OUTPATIENT
Start: 2018-10-24 | End: 2018-11-28 | Stop reason: SDUPTHER

## 2018-10-24 RX ORDER — LAMOTRIGINE 200 MG/1
TABLET ORAL
Refills: 0 | COMMUNITY
Start: 2018-09-17 | End: 2018-10-24

## 2018-10-24 RX ORDER — ATOMOXETINE 25 MG/1
CAPSULE ORAL
Refills: 0 | COMMUNITY
Start: 2018-10-13 | End: 2019-07-24

## 2018-10-24 RX ORDER — FLUOXETINE HYDROCHLORIDE 20 MG/1
CAPSULE ORAL
Refills: 0 | COMMUNITY
Start: 2018-09-28 | End: 2018-10-24

## 2018-10-24 RX ORDER — LORAZEPAM 1 MG/1
1 TABLET ORAL EVERY 8 HOURS PRN
Refills: 0 | COMMUNITY
Start: 2018-10-10

## 2018-10-24 RX ORDER — LAMOTRIGINE 100 MG/1
TABLET ORAL
Refills: 0 | COMMUNITY
Start: 2018-09-06 | End: 2018-10-24

## 2018-10-24 RX ORDER — FLUOXETINE HYDROCHLORIDE 40 MG/1
CAPSULE ORAL
Refills: 0 | COMMUNITY
Start: 2018-10-10 | End: 2019-07-24

## 2018-10-24 RX ORDER — CARBAMAZEPINE 200 MG/1
TABLET, EXTENDED RELEASE ORAL
Refills: 0 | COMMUNITY
Start: 2018-08-21 | End: 2018-10-24

## 2018-10-24 RX ORDER — PREDNISONE 5 MG/1
TABLET ORAL
Qty: 30 TAB | Refills: 0 | Status: SHIPPED | OUTPATIENT
Start: 2018-10-24 | End: 2019-07-24

## 2018-10-24 RX ORDER — DIVALPROEX SODIUM 500 MG/1
TABLET, DELAYED RELEASE ORAL
Refills: 0 | COMMUNITY
Start: 2018-09-28 | End: 2019-07-24

## 2018-10-24 NOTE — ASSESSMENT & PLAN NOTE
Pt is back on Strattera and on Depakote, Prozac and Lorazepam.  Told Edward to decrease his testosterone and Pt reports mood is better.

## 2018-10-24 NOTE — ASSESSMENT & PLAN NOTE
"Reports his Psychiatrist, Cristina uDran has recommended he cut back on the Testosterone and he has.  States with that and his med changes for his Bipolar Disorder he has had a more \"stable\" mood.  Denies suicidal ideation.   "

## 2018-10-24 NOTE — ASSESSMENT & PLAN NOTE
Hx of Rash on chest and now reports rash to neck  IN past tx w topical steroid cream and sometimes topical anti-fungal cream.

## 2018-10-24 NOTE — ASSESSMENT & PLAN NOTE
"Reports got sunburn to neck followed by a rash.  Using steroid cream( Hydrocortisone) and not improving.  \"doesnt do arturo\"  States the sunburn was this past summer      "

## 2018-10-24 NOTE — PROGRESS NOTES
Chief Complaint:   Chief Complaint   Patient presents with   • Rash     around neck    • Sunburn     around neck        HPI:  Jared is here today for RASH and SUNBURN    Nev  Report:   10/10/18 Lorazepam 1 mg # 90 by Cristina Duran(Psychiatry)  9/25/18 Androgel 1.62 % gel 75 by me  Similar entries in report 26 RX and 3 Prescribers  ------------------------------------------        His past medical history includes:  Acne  Anxiety, Depression, Bipolar Disorder  Testosterone Use/Low Testosterone  GERD  Lawrence's Esophagus hx  Hernia Repair  Hyperlipidemia, mild ( new)  Nasal Septum Deviation with Mucosa thickening ( new)  Sleep apnea on CPAP mask  Chronic Neck Pain  RASH, chronic     Established with  Psychiatry- Cristina Duran( in Dr Champagne office)     Referrals Approved in Past:  [facility] for Sleep Study  Dr Samayoa for deviated nasal septum ( seen 7/3/17)  GI consultants myla Martinez, Hx of Lawrence's Esophagus  Dermatology- Riley Cruz        REview of REcords:  8/23/18 Clinic for Testosterone MED refills, Erectile Dysfunction issues since being on Tegretol.  Request for Viagra generic.  Thoracic back pain x 1 wk  -----> RX Testosterone x 3 months through Nov. Refer to Physical Therapy.  RX Viagra generic. To f/u his Urologist prn  5/16 and 5/23 Physical Therapy appt's  5/14/18 Annual Wellness visit, testosterone refill.   3/26/18 Teleohone Encounter for refill of Testosterone.  3/5/18 Urgent Care Visit for Cough, SOB, Neg Strep Test, Neg Flu test. RX for Tamiflu just in case          Rash  Hx of Rash on chest and now reports rash to neck  IN past tx w topical steroid cream and sometimes topical anti-fungal cream.  States Is itchy.  Denies detergent change.  Discussed importance of checking if certain shirts/collars cause more irritation  And to change to Hypoallergenic Detergent.  Recommend he also keep food journal to see if certain foods cause a rash flareup.    Sunburn  Reports got sunburn to neck followed by  "a rash.  Using steroid cream( Hydrocortisone) and not improving.  \"doesnt do arturo\"  States the sunburn was this past summer      Gastroesophageal reflux disease with esophagitis  Reports is followed by GI for Lawrence's Esophagus and GERD has been worse because of food choices.  Is taking Omeprazole.  Discussed trial of Carafate.  Has f/u appt w GI next month.  Denies dark or bloody stools.    Psychiatric disorder  Pt is back on Strattera and on Depakote, Prozac and Lorazepam.  Told Edward to decrease his testosterone and Pt reports mood is better.    Testosterone insufficiency  Reports his Psychiatrist, Cristina Duran has recommended he cut back on the Testosterone and he has.  States with that and his med changes for his Bipolar Disorder he has had a more \"stable\" mood.  Denies suicidal ideation.       Patient Active Problem List    Diagnosis Date Noted   • Sunburn 10/24/2018   • Acute left-sided thoracic back pain 08/23/2018   • Other male erectile dysfunction 08/23/2018   • Viral URI 03/02/2018   • Shortness of breath 02/01/2018   • Skin irritation 01/02/2018   • Poor vision 11/13/2017   • Lump in chest 10/30/2017   • Neck discomfort 10/30/2017   • Rash 10/05/2017   • Obesity (BMI 35.0-39.9 without comorbidity) ([facility]) 10/05/2017   • Hyperlipidemia, mild 08/07/2017   • Acne vulgaris 08/07/2017   • Routine health maintenance 06/22/2017   • Psychiatric disorder 06/22/2017   • Testosterone insufficiency 06/22/2017   • History of Lawrence's esophagus 06/22/2017   • Gastroesophageal reflux disease with esophagitis 06/22/2017   • Deviated nasal septum 06/22/2017   • Snores 06/22/2017       Allergies:Pcn [penicillins] and Sulfa drugs    Medicines as of today:  Current Outpatient Prescriptions   Medication Sig Dispense Refill   • atomoxetine (STRATTERA) 25 MG capsule TK ONE C PO  ONCE D HS  0   • clindamycin (CLEOCIN T) 1 % Gel      • divalproex (DEPAKOTE) 500 MG Tablet Delayed Response TK 1 T PO HS  0   • fluoxetine (PROZAC) " "40 MG capsule TK 1 C PO QAM  0   • LORazepam (ATIVAN) 1 MG Tab TK 1 T PO TID PRN  0   • triamcinolone acetonide (KENALOG) 0.1 % Ointment Apply 1/4\" ribbon to neck twice daily as needed 1 Tube 2   • predniSONE (DELTASONE) 5 MG Tab Take 5 mg three times a day for 5 days, then 2x per day for 5 days, then once a day for 5 days. 30 Tab 0   • sucralfate (CARAFATE) 1 GM Tab Take 1 Tab by mouth 4 Times a Day,Before Meals and at Bedtime. 56 Tab 0   • tretinoin (RETIN-A) 0.1 % cream APPLY A SMALL AMOUNT TO ACNE EVERY NIGHT AT BEDTIME 45 g 0   • omeprazole (PRILOSEC) 20 MG delayed-release capsule TAKE 1 CAPSULE BY MOUTH ONCE DAILY 30 Cap 0   • Testosterone (ANDROGEL) 20.25 MG/ACT (1.62%) Gel 3 pumps daily 1 Bottle 0   • atorvastatin (LIPITOR) 10 MG Tab Take 1 Tab by mouth every day. 30 Tab 5   • fluticasone (FLONASE) 50 MCG/ACT nasal spray SPRAYS 2 SPRAYS IN EACH NOSTRIL QD  0   • sildenafil (REVATIO) 20 MG tablet Take 1 Tab by mouth 1 time daily as needed. 30 Tab 0   • albuterol 108 (90 Base) MCG/ACT Aero Soln inhalation aerosol Inhale 2 Puffs by mouth every 6 hours as needed for Shortness of Breath. 8.5 g 2     No current facility-administered medications for this visit.        Social History   Substance Use Topics   • Smoking status: Never Smoker   • Smokeless tobacco: Never Used   • Alcohol use No       Past Medical History:   Diagnosis Date   • Anxiety    • Bipolar affective disorder (HCC)    • Depression    • GERD (gastroesophageal reflux disease)        Family History   Problem Relation Age of Onset   • Arthritis Father    • Heart Disease Maternal Uncle    • Psychiatry Paternal Aunt    • Heart Disease Maternal Grandmother    • Lung Disease Maternal Grandfather    • Cancer Maternal Grandfather    • Cancer Paternal Grandfather        ROS:  Review of Systems   See HPI Above    Exam:  Blood pressure 118/80, pulse 88, temperature 36.9 °C (98.5 °F), temperature source Temporal, resp. rate 20, height 1.905 m (6' 3\"), weight " 108 kg (238 lb), SpO2 98 %. Body mass index is 29.75 kg/m².    General:  Well nourished, well developed male in NAD  HENT:Head is grossly normal.   Neck: Supple. Trachea is midline. Red rash in arch around base of anterior neck  Slight pinpoint bumps. Slightly red and irritated appearing.  Pulmonary: Clear to ausculation .  Normal effort. No rales, ronchi, or wheezing.   Cardiovascular: Regular rate and rhythm.  Abdomen-Abdomen is soft, No tenderness.  Upper extremities-  Good ROM  Lower extremities- neg for edema, redness, tenderness.  Neuro- A & O x 4. Speech clear and appropriate.    Current medications, allergies, and problem list reviewed with patient and updated in [facility] today.    Assessment/Plan:  1. Rash  triamcinolone acetonide (KENALOG) 0.1 % Ointment    predniSONE (DELTASONE) 5 MG Tab   2. Sunburn  triamcinolone acetonide (KENALOG) 0.1 % Ointment    predniSONE (DELTASONE) 5 MG Tab   3. Gastroesophageal reflux disease with esophagitis  sucralfate (CARAFATE) 1 GM Tab  Continue Omeprazole and f/u w GI as planned next month   4. Psychiatric disorder  [facility]   5. Testosterone insufficiency  Continue reduced dose, f/u here as needed for refill.       Return in about 2 months (around 12/24/2018).

## 2018-11-19 ENCOUNTER — OFFICE VISIT (OUTPATIENT)
Dept: MEDICAL GROUP | Facility: MEDICAL CENTER | Age: 48
End: 2018-11-19
Attending: FAMILY MEDICINE
Payer: MEDICARE

## 2018-11-19 VITALS
TEMPERATURE: 98.7 F | OXYGEN SATURATION: 100 % | SYSTOLIC BLOOD PRESSURE: 150 MMHG | HEART RATE: 100 BPM | BODY MASS INDEX: 28.85 KG/M2 | HEIGHT: 75 IN | WEIGHT: 232 LBS | RESPIRATION RATE: 14 BRPM | DIASTOLIC BLOOD PRESSURE: 90 MMHG

## 2018-11-19 DIAGNOSIS — J40 BRONCHITIS: ICD-10-CM

## 2018-11-19 PROCEDURE — 99213 OFFICE O/P EST LOW 20 MIN: CPT | Performed by: FAMILY MEDICINE

## 2018-11-19 PROCEDURE — 99214 OFFICE O/P EST MOD 30 MIN: CPT | Performed by: FAMILY MEDICINE

## 2018-11-19 RX ORDER — AZITHROMYCIN 250 MG/1
TABLET, FILM COATED ORAL
Qty: 6 TAB | Refills: 0 | Status: SHIPPED | OUTPATIENT
Start: 2018-11-19 | End: 2019-07-24

## 2018-11-19 RX ORDER — ALBUTEROL SULFATE 90 UG/1
2 AEROSOL, METERED RESPIRATORY (INHALATION) EVERY 6 HOURS PRN
Qty: 8.5 G | Refills: 2 | Status: SHIPPED | OUTPATIENT
Start: 2018-11-19 | End: 2019-02-13 | Stop reason: SDUPTHER

## 2018-11-19 ASSESSMENT — ENCOUNTER SYMPTOMS
PALPITATIONS: 0
NAUSEA: 0
SHORTNESS OF BREATH: 0
WHEEZING: 0
EYES NEGATIVE: 1
SORE THROAT: 1
VOMITING: 0
NECK PAIN: 0
SINUS PAIN: 1
CHILLS: 0
COUGH: 1
SPUTUM PRODUCTION: 1
FEVER: 1
HEADACHES: 0
DIARRHEA: 0
RHINORRHEA: 1
SWOLLEN GLANDS: 1
ABDOMINAL PAIN: 0

## 2018-11-19 NOTE — PROGRESS NOTES
"Subjective:      Jared Rg is a 48 y.o. male who presents with Pharyngitis and Cough            URI    This is a new problem. The current episode started 1 to 4 weeks ago. The problem has been unchanged. Maximum temperature: felt feverish. Associated symptoms include congestion, coughing, rhinorrhea, sinus pain, a sore throat and swollen glands. Pertinent negatives include no abdominal pain, chest pain, diarrhea, dysuria, ear pain, headaches, joint pain, joint swelling, nausea, neck pain, plugged ear sensation, rash, sneezing, vomiting or wheezing. He has tried decongestant (will order an inhaler and Zpak if sxs persist. will also order an xray. ER precautions given.) for the symptoms.       Review of Systems   Constitutional: Positive for fever and malaise/fatigue. Negative for chills.   HENT: Positive for congestion, rhinorrhea, sinus pain and sore throat. Negative for ear pain, hearing loss, sneezing and tinnitus.    Eyes: Negative.    Respiratory: Positive for cough and sputum production. Negative for shortness of breath and wheezing.    Cardiovascular: Negative for chest pain and palpitations.   Gastrointestinal: Negative for abdominal pain, diarrhea, nausea and vomiting.   Genitourinary: Negative for dysuria.   Musculoskeletal: Negative for joint pain and neck pain.   Skin: Negative for rash.   Neurological: Negative for headaches.          Objective:     /90 (BP Location: Left arm, Patient Position: Sitting)   Pulse 100   Temp 37.1 °C (98.7 °F)   Resp 14   Ht 1.905 m (6' 3\")   Wt 105.2 kg (232 lb)   SpO2 100%   BMI 29.00 kg/m²      Physical Exam   Constitutional: He is oriented to person, place, and time. He appears well-developed and well-nourished.   HENT:   Head: Normocephalic and atraumatic.   Congestion, TM dull B, pharyngeal erythema   Neck: Normal range of motion. Neck supple. No thyromegaly present.   Cardiovascular: Normal rate, regular rhythm and normal heart sounds.  " Exam reveals no friction rub.    No murmur heard.  Pulmonary/Chest: Effort normal and breath sounds normal. No respiratory distress. He has no wheezes. He has no rales.   Abdominal: Soft. Bowel sounds are normal. He exhibits no distension. There is no tenderness.   Lymphadenopathy:     He has no cervical adenopathy.   Neurological: He is alert and oriented to person, place, and time.   Skin: Skin is warm and dry.   Psychiatric: He has a normal mood and affect. His behavior is normal.   Nursing note and vitals reviewed.              Assessment/Plan:     1. Bronchitis  Will have him try an inhaler and start Zpak if sxs worsened.  We will also order an x-ray to further assess.  We will continue to follow.  - DX-CHEST-2 VIEWS; Future

## 2018-11-19 NOTE — LETTER
November 19, 2018       Patient: Jared Rg   YOB: 1970   Date of Visit: 11/19/2018         To Whom It May Concern:    It is my medical opinion that Jared Rg remain out of work until Fri. He is being treated for a bronchitis.    If you have any questions or concerns, please don't hesitate to call [phone]          Sincerely,          Yuval Hernandes M.D.  Electronically Signed

## 2018-11-19 NOTE — LETTER
November 19, 2018         Patient: Jared Rg   YOB: 1970   Date of Visit: 11/19/2018           To Whom it May Concern:    Jared Rg was seen in my clinic on 11/19/2018. He may return to work on 11/23/18.    If you have any questions or concerns, please don't hesitate to call.        Sincerely,           Yvual Hernandes M.D.  Electronically Signed

## 2018-11-28 ENCOUNTER — OFFICE VISIT (OUTPATIENT)
Dept: MEDICAL GROUP | Facility: MEDICAL CENTER | Age: 48
End: 2018-11-28
Attending: NURSE PRACTITIONER
Payer: MEDICARE

## 2018-11-28 VITALS
DIASTOLIC BLOOD PRESSURE: 80 MMHG | BODY MASS INDEX: 28.6 KG/M2 | RESPIRATION RATE: 16 BRPM | OXYGEN SATURATION: 94 % | TEMPERATURE: 98.6 F | HEIGHT: 75 IN | HEART RATE: 100 BPM | WEIGHT: 230 LBS | SYSTOLIC BLOOD PRESSURE: 130 MMHG

## 2018-11-28 DIAGNOSIS — R06.02 SHORTNESS OF BREATH: ICD-10-CM

## 2018-11-28 DIAGNOSIS — R21 RASH: ICD-10-CM

## 2018-11-28 DIAGNOSIS — K21.00 GASTROESOPHAGEAL REFLUX DISEASE WITH ESOPHAGITIS: ICD-10-CM

## 2018-11-28 DIAGNOSIS — L55.9 SUNBURN: ICD-10-CM

## 2018-11-28 DIAGNOSIS — E29.1 TESTOSTERONE DEFICIENCY IN MALE: ICD-10-CM

## 2018-11-28 DIAGNOSIS — E34.9 TESTOSTERONE INSUFFICIENCY: ICD-10-CM

## 2018-11-28 PROCEDURE — 99213 OFFICE O/P EST LOW 20 MIN: CPT | Performed by: NURSE PRACTITIONER

## 2018-11-28 PROCEDURE — 99214 OFFICE O/P EST MOD 30 MIN: CPT | Performed by: NURSE PRACTITIONER

## 2018-11-28 RX ORDER — SUCRALFATE 1 G/1
1 TABLET ORAL
Qty: 56 TAB | Refills: 0 | Status: SHIPPED | OUTPATIENT
Start: 2018-11-28 | End: 2019-01-08 | Stop reason: SDUPTHER

## 2018-11-28 RX ORDER — TRIAMCINOLONE ACETONIDE 5 MG/G
CREAM TOPICAL
Qty: 60 G | Refills: 2 | Status: SHIPPED | OUTPATIENT
Start: 2018-11-28 | End: 2019-07-24

## 2018-11-28 RX ORDER — TESTOSTERONE 16.2 MG/G
GEL TRANSDERMAL
Qty: 1 BOTTLE | Refills: 0 | Status: SHIPPED | OUTPATIENT
Start: 2018-11-28 | End: 2018-12-28

## 2018-11-28 RX ORDER — TESTOSTERONE 16.2 MG/G
GEL TRANSDERMAL
Qty: 1 BOTTLE | Refills: 0 | Status: SHIPPED | OUTPATIENT
Start: 2018-12-28 | End: 2019-02-13 | Stop reason: SDUPTHER

## 2018-11-28 RX ORDER — TESTOSTERONE 16.2 MG/G
GEL TRANSDERMAL
Qty: 1 BOTTLE | Refills: 0 | Status: SHIPPED | OUTPATIENT
Start: 2019-01-28 | End: 2019-02-13 | Stop reason: SDUPTHER

## 2018-11-28 RX ORDER — HYDROXYZINE HYDROCHLORIDE 25 MG/1
25 TABLET, FILM COATED ORAL NIGHTLY PRN
Qty: 30 TAB | Refills: 1 | Status: SHIPPED | OUTPATIENT
Start: 2018-11-28 | End: 2019-07-24

## 2018-11-28 ASSESSMENT — PAIN SCALES - GENERAL: PAINLEVEL: NO PAIN

## 2018-11-28 NOTE — ASSESSMENT & PLAN NOTE
We discussed his Psychicatist recommending lower dose of testoserone  Since he was on   Depakote, Prozac and Lorazepam and Strattera.  Pt asking for refill of Androgel for his deficiency.

## 2018-11-28 NOTE — PROGRESS NOTES
Chief Complaint:   Chief Complaint   Patient presents with   • Follow-Up     rash   • Medication Refill     cherelle gel        HPI:  Jared is here today for a follow-up on cough, rash  And Androgel refill.    His past medical history includes:  Acne  Anxiety, Depression, Bipolar Disorder  Testosterone Use/Low Testosterone  GERD  Lawrence's Esophagus hx  Hernia Repair  Hyperlipidemia, mild ( new)  Nasal Septum Deviation with Mucosa thickening ( new)  Sleep apnea on CPAP mask  Chronic Neck Pain  RASH, chronic     Established with  Psychiatry- Cristina Duran( in Dr Champagne office)     Referrals Approved in Past:  [facility] for Sleep Study  Dr Samayoa for deviated nasal septum ( seen 7/3/17)  GI consultants myla Martinez, Hx of Lawrence's Esophagus  Dermatology- Riley Cruz        REview of REcords:  11/19/18 Clinic appt w DR Hernandes for Bronchitis/cough  --> RX Zpak and CXR ordered.  10/24/18 Clinic appt for Rash, Sunburn--> RX Kenalog cream  Prednisone oral.   8/23/18 Clinic for Testosterone MED refills, Erectile Dysfunction issues since being on Tegretol.  Request for Viagra generic.  Thoracic back pain x 1 wk  -----> RX Testosterone x 3 months through Nov. Refer to Physical Therapy.  RX Viagra generic. To f/u his Urologist prn  5/16 and 5/23 Physical Therapy appt's  5/14/18 Annual Wellness visit, testosterone refill.   3/26/18 Teleohone Encounter for refill of Testosterone.  3/5/18 Urgent Care Visit for Cough, SOB, Neg Strep Test, Neg Flu test. RX for Tamiflu just in case       Testosterone insufficiency  We discussed his Psychicatist recommending lower dose of testoserone  Since he was on   Depakote, Prozac and Lorazepam and Strattera.  Pt asking for refill of Androgel for his deficiency.    Rash  Rash to neck and anterior chest he reports it comes and goes. REports the kenalog cream helps but still has itching.  We discussed avoiding sun and avoiding tight clothes around neck.  Itchy at night.     Shortness of  breath/Cough  REports no SOB , cough is resolving and finished the antibiotic.  Denies fever or chills or feeling ill.    GERD  REports the Carafate is helping and when not using his GERD is worse.  Continues Omeprazole.  Will refill Carafate but have instructed him to f/u w GI within next month to review this issue.  Hx of Lawrence's esophagus, GERD, Esophagitis  Denies bloody stools or dark stools currently.    Patient Active Problem List    Diagnosis Date Noted   • Sunburn 10/24/2018   • Acute left-sided thoracic back pain 08/23/2018   • Other male erectile dysfunction 08/23/2018   • Viral URI 03/02/2018   • Shortness of breath 02/01/2018   • Skin irritation 01/02/2018   • Poor vision 11/13/2017   • Lump in chest 10/30/2017   • Neck discomfort 10/30/2017   • Rash 10/05/2017   • Obesity (BMI 35.0-39.9 without comorbidity) ([facility]) 10/05/2017   • Hyperlipidemia, mild 08/07/2017   • Acne vulgaris 08/07/2017   • Routine health maintenance 06/22/2017   • Psychiatric disorder 06/22/2017   • Testosterone insufficiency 06/22/2017   • History of Lawrence's esophagus 06/22/2017   • Gastroesophageal reflux disease with esophagitis 06/22/2017   • Deviated nasal septum 06/22/2017   • Snores 06/22/2017       Allergies:Pcn [penicillins] and Sulfa drugs    Medicines as of today:  Current Outpatient Prescriptions   Medication Sig Dispense Refill   • sucralfate (CARAFATE) 1 GM Tab Take 1 Tab by mouth 4 Times a Day,Before Meals and at Bedtime. 56 Tab 0   • hydrOXYzine HCl (ATARAX) 25 MG Tab Take 1 Tab by mouth at bedtime as needed for Itching. 30 Tab 1   • triamcinolone acetonide (KENALOG) 0.5 % Cream May use three times a day to rash 60 g 2   • Testosterone (ANDROGEL) 20.25 MG/ACT (1.62%) Gel 2 pumps daily 1 Bottle 0   • [START ON 12/28/2018] Testosterone (ANDROGEL) 20.25 MG/ACT (1.62%) Gel 3 pumps daily 1 Bottle 0   • [START ON 1/28/2019] Testosterone (ANDROGEL PUMP) 20.25 MG/ACT (1.62%) Gel APPLY 3 PUMP DEPRESSIONS TOPICALLY TO THE  UPPER SHOULDER OR ARM ONCE DAILY 1 Bottle 0   • azithromycin (ZITHROMAX Z-LORNA) 250 MG Tab z lorna to use as directed 6 Tab 0   • albuterol 108 (90 Base) MCG/ACT Aero Soln inhalation aerosol Inhale 2 Puffs by mouth every 6 hours as needed for Shortness of Breath. 8.5 g 2   • atomoxetine (STRATTERA) 25 MG capsule TK ONE C PO  ONCE D HS  0   • clindamycin (CLEOCIN T) 1 % Gel      • divalproex (DEPAKOTE) 500 MG Tablet Delayed Response TK 1 T PO HS  0   • fluoxetine (PROZAC) 40 MG capsule TK 1 C PO QAM  0   • LORazepam (ATIVAN) 1 MG Tab TK 1 T PO TID PRN  0   • predniSONE (DELTASONE) 5 MG Tab Take 5 mg three times a day for 5 days, then 2x per day for 5 days, then once a day for 5 days. 30 Tab 0   • tretinoin (RETIN-A) 0.1 % cream APPLY A SMALL AMOUNT TO ACNE EVERY NIGHT AT BEDTIME 45 g 0   • omeprazole (PRILOSEC) 20 MG delayed-release capsule TAKE 1 CAPSULE BY MOUTH ONCE DAILY 30 Cap 0   • sildenafil (REVATIO) 20 MG tablet Take 1 Tab by mouth 1 time daily as needed. 30 Tab 0   • atorvastatin (LIPITOR) 10 MG Tab Take 1 Tab by mouth every day. 30 Tab 5   • fluticasone (FLONASE) 50 MCG/ACT nasal spray SPRAYS 2 SPRAYS IN EACH NOSTRIL QD  0     No current facility-administered medications for this visit.        Social History   Substance Use Topics   • Smoking status: Never Smoker   • Smokeless tobacco: Never Used   • Alcohol use No       Past Medical History:   Diagnosis Date   • Anxiety    • Bipolar affective disorder (HCC)    • Depression    • GERD (gastroesophageal reflux disease)        Family History   Problem Relation Age of Onset   • Arthritis Father    • Heart Disease Maternal Uncle    • Psychiatry Paternal Aunt    • Heart Disease Maternal Grandmother    • Lung Disease Maternal Grandfather    • Cancer Maternal Grandfather    • Cancer Paternal Grandfather        ROS:  Review of Systems   See HPI Above    Exam:  Blood pressure 130/80, pulse 100, temperature 37 °C (98.6 °F), temperature source Temporal, resp. rate 16,  "height 1.905 m (6' 3\"), weight 104.3 kg (230 lb), SpO2 94 %. Body mass index is 28.75 kg/m².    General:  Well nourished, well developed male in NAD  HENT:Head is grossly normal. PERRL.  Neck: Supple. Trachea is midline.  Pulmonary: Clear to ausculation .  Normal effort. No rales, ronchi, or wheezing.   Cardiovascular: Regular rate and rhythm.  Abdomen-Abdomen is soft, No tenderness.  Upper extremities- MAEW.   Lower extremities- neg for edema, redness, tenderness.  Neuro- A & O x 4. Speech clear and appropriate.    Current medications, allergies, and problem list reviewed with patient and updated in [facility] today.    Assessment/Plan:       1. Rash  hydrOXYzine HCl (ATARAX) 25 MG Tab    triamcinolone acetonide (KENALOG) 0.5 % Cream  To try other shirt fabrics, all cotton and check detergent    2. Shortness of breath /Cough Cough resolved. Albuterol inhaler prn        3. Gastroesophageal reflux disease with esophagitis  sucralfate (CARAFATE) 1 GM Tab  Continue Omeprazole.   F/u appt w GI MD as planned   4. Testosterone deficiency in male  Testosterone (ANDROGEL) 20.25 MG/ACT (1.62%) Gel NOv--> Dec  Decrease in dose to 2 pumps per day total    Testosterone (ANDROGEL) 20.25 MG/ACT (1.62%) Gel Dec--> Jan    Testosterone (ANDROGEL PUMP) 20.25 MG/ACT (1.62%) Gel Jan---> Feb       Return in about 3 months (around 2/28/2019).  "

## 2018-11-28 NOTE — ASSESSMENT & PLAN NOTE
Rash to neck and anterior chest he reports it comes and goes. REports the kenalog cream helps but still has itching.  We discussed avoiding sun and avoiding tight clothes around neck.  Itchy at night.

## 2018-11-28 NOTE — ASSESSMENT & PLAN NOTE
REports the Carafate is helping and when not using his GERD is worse.  Continues Omeprazole.  Will refill Carafate but have instructed him to f/u w GI within next month to review this issue.  Hx of Lawrence's esophagus, GERD, Esophagitis  Denies bloody stools or dark stools currently.

## 2019-01-08 DIAGNOSIS — K21.00 GASTROESOPHAGEAL REFLUX DISEASE WITH ESOPHAGITIS: ICD-10-CM

## 2019-01-08 RX ORDER — SUCRALFATE 1 G/1
TABLET ORAL
Qty: 56 TAB | Refills: 0 | Status: SHIPPED | OUTPATIENT
Start: 2019-01-08 | End: 2019-04-15

## 2019-01-31 ENCOUNTER — TELEPHONE (OUTPATIENT)
Dept: MEDICAL GROUP | Facility: MEDICAL CENTER | Age: 49
End: 2019-01-31

## 2019-02-02 NOTE — TELEPHONE ENCOUNTER
FINAL PRIOR AUTHORIZATION STATUS:    1.  Name of Medication & Dose: androgel pump      2. Prior Auth Status: Approved through 12/31/2019     3. Action Taken: Pharmacy Notified: yes Patient Notified: yes

## 2019-02-13 ENCOUNTER — OFFICE VISIT (OUTPATIENT)
Dept: MEDICAL GROUP | Facility: MEDICAL CENTER | Age: 49
End: 2019-02-13
Attending: NURSE PRACTITIONER
Payer: MEDICARE

## 2019-02-13 VITALS
OXYGEN SATURATION: 96 % | BODY MASS INDEX: 28.47 KG/M2 | TEMPERATURE: 98.2 F | RESPIRATION RATE: 16 BRPM | HEIGHT: 75 IN | HEART RATE: 116 BPM | WEIGHT: 229 LBS | DIASTOLIC BLOOD PRESSURE: 70 MMHG | SYSTOLIC BLOOD PRESSURE: 126 MMHG

## 2019-02-13 DIAGNOSIS — R05.9 COUGH: ICD-10-CM

## 2019-02-13 DIAGNOSIS — L70.0 ACNE VULGARIS: ICD-10-CM

## 2019-02-13 DIAGNOSIS — E29.1 TESTOSTERONE DEFICIENCY IN MALE: ICD-10-CM

## 2019-02-13 DIAGNOSIS — E34.9 TESTOSTERONE INSUFFICIENCY: ICD-10-CM

## 2019-02-13 DIAGNOSIS — J45.21 MILD INTERMITTENT ASTHMATIC BRONCHITIS WITH ACUTE EXACERBATION: ICD-10-CM

## 2019-02-13 PROCEDURE — 99214 OFFICE O/P EST MOD 30 MIN: CPT | Performed by: NURSE PRACTITIONER

## 2019-02-13 RX ORDER — ALBUTEROL SULFATE 90 UG/1
2 AEROSOL, METERED RESPIRATORY (INHALATION) EVERY 6 HOURS PRN
Qty: 8.5 G | Refills: 2 | Status: SHIPPED | OUTPATIENT
Start: 2019-02-13 | End: 2019-07-24

## 2019-02-13 RX ORDER — ATOMOXETINE 40 MG/1
CAPSULE ORAL
Refills: 1 | COMMUNITY
Start: 2019-01-05 | End: 2019-07-24

## 2019-02-13 RX ORDER — OMEPRAZOLE 40 MG/1
CAPSULE, DELAYED RELEASE ORAL
COMMUNITY
Start: 2019-02-12 | End: 2019-07-24

## 2019-02-13 RX ORDER — DIVALPROEX SODIUM 250 MG/1
750 TABLET, DELAYED RELEASE ORAL 2 TIMES DAILY
Refills: 1 | COMMUNITY
Start: 2019-01-30

## 2019-02-13 RX ORDER — AZITHROMYCIN 250 MG/1
TABLET, FILM COATED ORAL
Qty: 6 TAB | Refills: 0 | Status: SHIPPED | OUTPATIENT
Start: 2019-02-13 | End: 2019-04-15

## 2019-02-13 RX ORDER — TESTOSTERONE 20.25 MG/1.25G
GEL TOPICAL
COMMUNITY
Start: 2019-01-05 | End: 2019-04-15

## 2019-02-13 RX ORDER — TESTOSTERONE 20.25 MG/1.25G
20.25 GEL TOPICAL DAILY
Refills: 0 | COMMUNITY
Start: 2019-01-05

## 2019-02-13 RX ORDER — FLUOXETINE HYDROCHLORIDE 20 MG/1
CAPSULE ORAL
Refills: 1 | COMMUNITY
Start: 2019-02-03 | End: 2019-07-24

## 2019-02-13 RX ORDER — ATOMOXETINE 60 MG/1
CAPSULE ORAL
Refills: 1 | COMMUNITY
Start: 2019-01-31 | End: 2019-07-31

## 2019-02-13 RX ORDER — TRETINOIN 1 MG/G
CREAM TOPICAL
Qty: 45 G | Refills: 0 | Status: SHIPPED | OUTPATIENT
Start: 2019-02-13 | End: 2019-03-05 | Stop reason: SDUPTHER

## 2019-02-13 RX ORDER — CIPROFLOXACIN 500 MG/1
TABLET, FILM COATED ORAL
Refills: 0 | COMMUNITY
Start: 2019-01-22 | End: 2019-04-15

## 2019-02-13 RX ORDER — TESTOSTERONE 16.2 MG/G
GEL TRANSDERMAL
Qty: 1 BOTTLE | Refills: 0 | Status: SHIPPED | OUTPATIENT
Start: 2019-02-13 | End: 2019-03-17

## 2019-02-13 RX ORDER — TESTOSTERONE 16.2 MG/G
GEL TRANSDERMAL
Qty: 1 BOTTLE | Refills: 0 | Status: SHIPPED | OUTPATIENT
Start: 2019-03-15 | End: 2019-04-15 | Stop reason: SDUPTHER

## 2019-02-13 RX ORDER — GUAIFENESIN AND DEXTROMETHORPHAN HYDROBROMIDE 100; 10 MG/5ML; MG/5ML
10 SOLUTION ORAL EVERY 6 HOURS PRN
Qty: 240 ML | Refills: 0 | Status: SHIPPED | OUTPATIENT
Start: 2019-02-13 | End: 2019-07-24

## 2019-02-13 NOTE — PROGRESS NOTES
"Chief Complaint:   Chief Complaint   Patient presents with   • Medication Refill     testosterone gel   • Cough     x yesterday   • Pharyngitis     monday       HPI:  Jared is here today for a follow-up on Low Testosterone Condition    [place]  Report:   1/30/19 Lorazepam 1 mg # 90 Cristina Naqviriyenny( Psychiatry)  1/5/19 Lorazepam 1 mg # 90 Cristina Parizo  1/5/19 Testosterone 1.62 % 75 by me  More similar entries in report      His past medical history includes:  Acne  Anxiety, Depression, Bipolar Disorder  Testosterone Use/Low Testosterone  GERD  Lawrence's Esophagus hx  Hernia Repair  Hyperlipidemia, mild ( new)  Nasal Septum Deviation with Mucosa thickening ( new)  Sleep apnea on CPAP mask  Chronic Neck Pain  RASH, chronic     Established with  Psychiatry- Cristina Duran( in Dr Champagne office)     Referrals Approved in Past:  [facility] for Sleep Study  Dr Samayoa for deviated nasal septum ( seen 7/3/17)  GI consultants myla Martinez, Hx of Lawrence's Esophagus  Dermatology- Riley Cruz        REview of REcords:  11/19/18 Clinic appt w DR Hernandes for Bronchitis/cough  --> RX Zpak and CXR ordered.  10/24/18 Clinic appt for Rash, Sunburn--> RX Kenalog cream  Prednisone oral.   8/23/18 Clinic for Testosterone MED refills, Erectile Dysfunction issues since being on Tegretol.  Request for Viagra generic.  Thoracic back pain x 1 wk  -----> RX Testosterone x 3 months through Nov. Refer to Physical Therapy.  RX Viagra generic. To f/u his Urologist prn    Testosterone insufficiency  Reports here for refills of Testosterone gel spray  Discussed doing 2 months worth this time and make f/u appt for transfer of care to another provider in 2 months, as I am leaving the practice.    Cough/sore throat, post nasal drip  Reports in Nov and Dec had throat pain and \"upper bronchial\" cough and now mild sore throat.  In past on Zpak and helped    Reports slight tight cough,   Recommend Albuterol inhaler, cough drops,  Contingent " antibiotic.      Patient Active Problem List    Diagnosis Date Noted   • Cough 02/13/2019   • Sunburn 10/24/2018   • Acute left-sided thoracic back pain 08/23/2018   • Other male erectile dysfunction 08/23/2018   • Viral URI 03/02/2018   • Shortness of breath 02/01/2018   • Skin irritation 01/02/2018   • Poor vision 11/13/2017   • Lump in chest 10/30/2017   • Neck discomfort 10/30/2017   • Rash 10/05/2017   • Obesity (BMI 35.0-39.9 without comorbidity) ([facility]) 10/05/2017   • Hyperlipidemia, mild 08/07/2017   • Acne vulgaris 08/07/2017   • Routine health maintenance 06/22/2017   • Psychiatric disorder 06/22/2017   • Testosterone insufficiency 06/22/2017   • History of Lawrence's esophagus 06/22/2017   • Gastroesophageal reflux disease with esophagitis 06/22/2017   • Deviated nasal septum 06/22/2017   • Snores 06/22/2017       Allergies:Pcn [penicillins] and Sulfa drugs    Medicines as of today:  Current Outpatient Prescriptions   Medication Sig Dispense Refill   • triamcinolone acetonide (KENALOG) 0.5 % Cream May use three times a day to rash 60 g 2   • atomoxetine (STRATTERA) 25 MG capsule TK ONE C PO  ONCE D HS  0   • divalproex (DEPAKOTE) 500 MG Tablet Delayed Response TK 1 T PO HS  0   • fluoxetine (PROZAC) 40 MG capsule TK 1 C PO QAM  0   • LORazepam (ATIVAN) 1 MG Tab TK 1 T PO TID PRN  0   • omeprazole (PRILOSEC) 20 MG delayed-release capsule TAKE 1 CAPSULE BY MOUTH ONCE DAILY 30 Cap 0   • atorvastatin (LIPITOR) 10 MG Tab Take 1 Tab by mouth every day. 30 Tab 5   • fluticasone (FLONASE) 50 MCG/ACT nasal spray SPRAYS 2 SPRAYS IN EACH NOSTRIL QD  0   • [START ON 3/15/2019] Testosterone (ANDROGEL PUMP) 20.25 MG/ACT (1.62%) Gel APPLY 2 PUMP DEPRESSIONS TOPICALLY TO THE UPPER SHOULDER OR ARM ONCE DAILY (Patient not taking: Reported on 2/13/2019) 1 Bottle 0   • Testosterone (ANDROGEL) 20.25 MG/ACT (1.62%) Gel 2 pumps daily (Patient not taking: Reported on 2/13/2019) 1 Bottle 0   • tretinoin (RETIN-A) 0.1 % cream Use  "1/4\" ribbon to acne as discussed once nightly (Patient not taking: Reported on 2/13/2019) 45 g 0   • azithromycin (ZITHROMAX) 250 MG Tab Z pack-Take 2 tabs day one and then 1 tab daily for a total of 5 days (Patient not taking: Reported on 2/13/2019) 6 Tab 0   • Dextromethorphan-Guaifenesin (ROBITUSSIN DM)  MG/5ML Syrup Take 10 mL by mouth every 6 hours as needed. (Patient not taking: Reported on 2/13/2019) 240 mL 0   • albuterol 108 (90 Base) MCG/ACT Aero Soln inhalation aerosol Inhale 2 Puffs by mouth every 6 hours as needed for Shortness of Breath. (Patient not taking: Reported on 2/13/2019) 8.5 g 2   • Testosterone 20.25 MG/1.25GM (1.62%) Gel      • Testosterone 20.25 MG/1.25GM (1.62%) Gel   0   • atomoxetine (STRATTERA) 40 MG capsule TK ONE C PO  ONCE D HS  1   • atomoxetine (STRATTERA) 60 MG capsule TK 1 C PO ONCE D HS  1   • ciprofloxacin (CIPRO) 500 MG Tab TK 1 T PO  BID X 10 DAYS  0   • divalproex (DEPAKOTE) 250 MG Tablet Delayed Response   1   • FLUoxetine (PROZAC) 20 MG Cap TK 3 CS PO ONCE D IN THE MORNING  1   • omeprazole (PRILOSEC) 40 MG delayed-release capsule      • sucralfate (CARAFATE) 1 GM Tab TAKE 1 TABLET BY MOUTH FOUR TIMES DAILY BEFORE MEALS AND AT BEDTIME (Patient not taking: Reported on 2/13/2019) 56 Tab 0   • hydrOXYzine HCl (ATARAX) 25 MG Tab Take 1 Tab by mouth at bedtime as needed for Itching. (Patient not taking: Reported on 2/13/2019) 30 Tab 1   • azithromycin (ZITHROMAX Z-LORNA) 250 MG Tab z lorna to use as directed (Patient not taking: Reported on 2/13/2019) 6 Tab 0   • clindamycin (CLEOCIN T) 1 % Gel      • predniSONE (DELTASONE) 5 MG Tab Take 5 mg three times a day for 5 days, then 2x per day for 5 days, then once a day for 5 days. (Patient not taking: Reported on 2/13/2019) 30 Tab 0   • sildenafil (REVATIO) 20 MG tablet Take 1 Tab by mouth 1 time daily as needed. 30 Tab 0     No current facility-administered medications for this visit.        Social History   Substance Use " "Topics   • Smoking status: Never Smoker   • Smokeless tobacco: Never Used   • Alcohol use No       Past Medical History:   Diagnosis Date   • Anxiety    • Bipolar affective disorder (HCC)    • Depression    • GERD (gastroesophageal reflux disease)        Family History   Problem Relation Age of Onset   • Arthritis Father    • Heart Disease Maternal Uncle    • Psychiatry Paternal Aunt    • Heart Disease Maternal Grandmother    • Lung Disease Maternal Grandfather    • Cancer Maternal Grandfather    • Cancer Paternal Grandfather        ROS:  Review of Systems   See HPI Above    Exam:  Blood pressure 126/70, pulse (!) 116, temperature 36.8 °C (98.2 °F), temperature source Temporal, resp. rate 16, height 1.905 m (6' 3\"), weight 103.9 kg (229 lb), SpO2 96 %. Body mass index is 28.62 kg/m².    General:  Well nourished, well developed male in NAD  HENT:Head is grossly normal. PERRL. Posterior pharynx slightly red, no exudate.  Neck: Supple. Trachea is midline. No lymph node swelling/tenderness to anterior neck  Pulmonary: Clear to ausculation .  Normal effort. No rales, ronchi, or wheezing.   Cardiovascular: Regular rate and rhythm.  Abdomen-Abdomen is soft, No tenderness.  Upper extremities- MAEW  Lower extremities- neg for edema  Neuro- A & O x 4. Speech clear and appropriate.    Current medications, allergies, and problem list reviewed with patient and updated in [facility] today.    Assessment/Plan:  1. Testosterone deficiency in male     Testosterone Insufficiency Testosterone (ANDROGEL PUMP) 20.25 MG/ACT (1.62%) Gel Feb---> March    Testosterone (ANDROGEL) 20.25 MG/ACT (1.62%) Gel- March ---> April        2. Cough  Dextromethorphan-Guaifenesin (ROBITUSSIN DM)  MG/5ML Syrup   4. Acne vulgaris  tretinoin (RETIN-A) 0.1 % cream  Recommend future, try another time for referral to DERM.   5. Mild intermittent asthmatic bronchitis with acute exacerbation  azithromycin (ZITHROMAX) 250 MG Tab    albuterol 108 (90 Base) MCG/ACT " Aero Soln inhalation aerosol       Return in about 2 months (around 4/13/2019) for carmita Goldsmith, .

## 2019-02-18 DIAGNOSIS — E78.5 HYPERLIPIDEMIA, MILD: ICD-10-CM

## 2019-02-19 RX ORDER — ATORVASTATIN CALCIUM 10 MG/1
TABLET, FILM COATED ORAL
Qty: 30 TAB | Refills: 2 | Status: SHIPPED | OUTPATIENT
Start: 2019-02-19 | End: 2019-07-24

## 2019-03-05 DIAGNOSIS — L70.0 ACNE VULGARIS: ICD-10-CM

## 2019-03-05 RX ORDER — TRETINOIN 1 MG/G
CREAM TOPICAL
Qty: 45 G | Refills: 0 | Status: SHIPPED | OUTPATIENT
Start: 2019-03-05 | End: 2019-07-26 | Stop reason: SDUPTHER

## 2019-03-08 ENCOUNTER — TELEPHONE (OUTPATIENT)
Dept: MEDICAL GROUP | Facility: MEDICAL CENTER | Age: 49
End: 2019-03-08

## 2019-03-08 NOTE — TELEPHONE ENCOUNTER
DOCUMENTATION OF PAR STATUS:    1. Name of Medication & Dose: Trentinoin     2. Name of Prescription Coverage Company & phone #: Medicare    3. Date Prior Auth Submitted: 3/8/19    4. What information was given to obtain insurance decision? Office notes, Med Hx, Dx    5. Prior Auth Status? Pending    6. Patient Notified: yes

## 2019-03-11 NOTE — TELEPHONE ENCOUNTER
FINAL PRIOR AUTHORIZATION STATUS:    1.  Name of Medication & Dose: Trentinoin     2. Prior Auth Status: Approved through 12/31/2019     3. Action Taken: Pharmacy Notified: yes Patient Notified: yes

## 2019-03-16 ENCOUNTER — NON-PROVIDER VISIT (OUTPATIENT)
Dept: URGENT CARE | Facility: PHYSICIAN GROUP | Age: 49
End: 2019-03-16

## 2019-03-16 DIAGNOSIS — Z11.1 PPD SCREENING TEST: ICD-10-CM

## 2019-03-16 NOTE — NON-PROVIDER
Jared Rg is a 48 y.o. male here for a non-provider visit for PPD placement -- Step 1 of 1    Reason for PPD:  work requirement    1. TB evaluation questionnaire completed by patient? Yes      -  If any answers marked yes did you contact a provider prior to placing? Yes  2.  Patient notified to return to clinic for reading on: 3/18/19 after 10:50 am   Or ....3/19/19 before 10:50am   3.  PPD Placement documentation completed on TB evaluation questionnaire? Yes  4.  Location of TB evaluation questionnaire filed:

## 2019-03-19 ENCOUNTER — NON-PROVIDER VISIT (OUTPATIENT)
Dept: URGENT CARE | Facility: PHYSICIAN GROUP | Age: 49
End: 2019-03-19

## 2019-03-19 LAB — TB WHEAL 3D P 5 TU DIAM: 0 MM

## 2019-03-19 NOTE — NON-PROVIDER
Jared Lutz Aracelialiciakelby is a 48 y.o. male here for a non-provider visit for PPD reading -- Step 1 of 1.      1.  Resulted in Epic under enter/edit results? Yes   2.  TB evaluation questionnaire scanned into chart and original given to patient?Yes      3. Was induration greater than 0 mm? No.    If Step 1 of 2, when is patient returning for second step (delete if N/A): N/A    Routed to PCP? No

## 2019-04-15 ENCOUNTER — OFFICE VISIT (OUTPATIENT)
Dept: MEDICAL GROUP | Facility: MEDICAL CENTER | Age: 49
End: 2019-04-15
Attending: FAMILY MEDICINE
Payer: MEDICARE

## 2019-04-15 VITALS
BODY MASS INDEX: 29.22 KG/M2 | HEART RATE: 96 BPM | RESPIRATION RATE: 16 BRPM | HEIGHT: 75 IN | DIASTOLIC BLOOD PRESSURE: 68 MMHG | WEIGHT: 235 LBS | SYSTOLIC BLOOD PRESSURE: 122 MMHG | TEMPERATURE: 98.2 F | OXYGEN SATURATION: 95 %

## 2019-04-15 DIAGNOSIS — K21.00 GASTROESOPHAGEAL REFLUX DISEASE WITH ESOPHAGITIS: ICD-10-CM

## 2019-04-15 DIAGNOSIS — F31.30 BIPOLAR AFFECTIVE DISORDER, CURRENT EPISODE DEPRESSED, CURRENT EPISODE SEVERITY UNSPECIFIED (HCC): ICD-10-CM

## 2019-04-15 DIAGNOSIS — F31.31 BIPOLAR AFFECTIVE DISORDER, CURRENTLY DEPRESSED, MILD (HCC): ICD-10-CM

## 2019-04-15 DIAGNOSIS — E34.9 TESTOSTERONE INSUFFICIENCY: ICD-10-CM

## 2019-04-15 DIAGNOSIS — J06.9 ACUTE URI: ICD-10-CM

## 2019-04-15 DIAGNOSIS — E29.1 TESTOSTERONE DEFICIENCY IN MALE: ICD-10-CM

## 2019-04-15 DIAGNOSIS — K21.00 GERD WITH ESOPHAGITIS: ICD-10-CM

## 2019-04-15 PROCEDURE — 99214 OFFICE O/P EST MOD 30 MIN: CPT | Performed by: FAMILY MEDICINE

## 2019-04-15 PROCEDURE — 99213 OFFICE O/P EST LOW 20 MIN: CPT | Performed by: FAMILY MEDICINE

## 2019-04-15 RX ORDER — TESTOSTERONE 16.2 MG/G
GEL TRANSDERMAL
Qty: 1 BOTTLE | Refills: 11 | Status: SHIPPED | OUTPATIENT
Start: 2019-04-15 | End: 2019-05-14 | Stop reason: SDUPTHER

## 2019-04-15 ASSESSMENT — PATIENT HEALTH QUESTIONNAIRE - PHQ9
SUM OF ALL RESPONSES TO PHQ QUESTIONS 1-9: 23
CLINICAL INTERPRETATION OF PHQ2 SCORE: 6
5. POOR APPETITE OR OVEREATING: 3 - NEARLY EVERY DAY

## 2019-04-15 NOTE — PROGRESS NOTES
Chief Complaint   Patient presents with   • Establish Care   • Depression   • Neck Pain       HISTORY OF PRESENT ILLNESS: Patient is a 48 y.o. male established patient who presents today to transfer primary care providers and discuss the problems below        Testosterone insufficiency  Patient is taking 2 pumps daily of AndroGel 20.25 mg per ACT-1.62% gel.  Reports sexual drive has improved.  He still notes prolonged delayed ejaculation likely secondary to his 60 mg dose of fluoxetine.  Not reporting irritability as he did with previous higher doses of this medication.    Gastroesophageal reflux disease with esophagitis  Patient reports she is recently switched for formulary reasons to pantoprazole which she feels is having moderate benefit with regard to heartburn symptoms.  Reports no active substernal burning or epigastric burning at this time.  Is apparently being scheduled in the next month for upper and lower endoscopy by [facility]-Dr. Calero.  Has reported occasional scant red blood in his stools.    Acute URI  Patient reports onset 2 days ago of some tenderness and swelling on the left side of his neck up to the level of his jaw.  Also reports a mild sore throat and a mild intermittent cough nonproductive.  Has not noted any fevers.  Denies current ear pain.    Bipolar affective disorder, current episode depressed (HCC)  Patient is followed by Cristina Duran, psychiatry, for bipolar mood disorder, anxiety, depression.  He is currently taking 60 mg of fluoxetine, along with 750 mg twice daily of Depakote and 1 mg 3 times a day (PRN) of lorazepam.  PHQ 9 score today is 23.  Patient does note ongoing symptoms of sadness and depression.  Patient notes intermittent suicidal ideation.    Social history-, psychiatric disability  Patient Active Problem List    Diagnosis Date Noted   • Acute URI 04/15/2019   • Bipolar affective disorder, current episode depressed (HCC) 04/15/2019   • Cough 02/13/2019   •  Sunburn 10/24/2018   • Acute left-sided thoracic back pain 08/23/2018   • Other male erectile dysfunction 08/23/2018   • Viral URI 03/02/2018   • Shortness of breath 02/01/2018   • Skin irritation 01/02/2018   • Poor vision 11/13/2017   • Lump in chest 10/30/2017   • Neck discomfort 10/30/2017   • Rash 10/05/2017   • Obesity (BMI 35.0-39.9 without comorbidity) ([facility]) 10/05/2017   • Hyperlipidemia, mild 08/07/2017   • Acne vulgaris 08/07/2017   • Routine health maintenance 06/22/2017   • Psychiatric disorder 06/22/2017   • Testosterone insufficiency 06/22/2017   • History of Lawrence's esophagus 06/22/2017   • Gastroesophageal reflux disease with esophagitis 06/22/2017   • Deviated nasal septum 06/22/2017   • Snores 06/22/2017       Allergies:Pcn [penicillins] and Sulfa drugs    Current Outpatient Prescriptions   Medication Sig Dispense Refill   • Testosterone (ANDROGEL PUMP) 20.25 MG/ACT (1.62%) Gel APPLY 2 PUMP DEPRESSIONS TOPICALLY TO THE UPPER SHOULDER OR ARM ONCE DAILY 1 Bottle 11   • tretinoin (RETIN-A) 0.1 % cream USE A 1/4 INCH RIBBON TOPICALLY TO ACNE ONCE DAILY AT BEDTIME AS DISCUSSED WITH PHYSICIAN 45 g 0   • atorvastatin (LIPITOR) 10 MG Tab TAKE 1 TABLET BY MOUTH EVERY DAY 30 Tab 2   • Dextromethorphan-Guaifenesin (ROBITUSSIN DM)  MG/5ML Syrup Take 10 mL by mouth every 6 hours as needed. (Patient not taking: Reported on 2/13/2019) 240 mL 0   • albuterol 108 (90 Base) MCG/ACT Aero Soln inhalation aerosol Inhale 2 Puffs by mouth every 6 hours as needed for Shortness of Breath. (Patient not taking: Reported on 2/13/2019) 8.5 g 2   • Testosterone 20.25 MG/1.25GM (1.62%) Gel   0   • atomoxetine (STRATTERA) 40 MG capsule TK ONE C PO  ONCE D HS  1   • atomoxetine (STRATTERA) 60 MG capsule TK 1 C PO ONCE D HS  1   • divalproex (DEPAKOTE) 250 MG Tablet Delayed Response   1   • FLUoxetine (PROZAC) 20 MG Cap TK 3 CS PO ONCE D IN THE MORNING  1   • omeprazole (PRILOSEC) 40 MG delayed-release capsule      •  hydrOXYzine HCl (ATARAX) 25 MG Tab Take 1 Tab by mouth at bedtime as needed for Itching. (Patient not taking: Reported on 2/13/2019) 30 Tab 1   • triamcinolone acetonide (KENALOG) 0.5 % Cream May use three times a day to rash 60 g 2   • azithromycin (ZITHROMAX Z-LORNA) 250 MG Tab z lorna to use as directed (Patient not taking: Reported on 2/13/2019) 6 Tab 0   • atomoxetine (STRATTERA) 25 MG capsule TK ONE C PO  ONCE D HS  0   • clindamycin (CLEOCIN T) 1 % Gel      • divalproex (DEPAKOTE) 500 MG Tablet Delayed Response TK 1 T PO HS  0   • fluoxetine (PROZAC) 40 MG capsule TK 1 C PO QAM  0   • LORazepam (ATIVAN) 1 MG Tab TK 1 T PO TID PRN  0   • predniSONE (DELTASONE) 5 MG Tab Take 5 mg three times a day for 5 days, then 2x per day for 5 days, then once a day for 5 days. (Patient not taking: Reported on 2/13/2019) 30 Tab 0   • omeprazole (PRILOSEC) 20 MG delayed-release capsule TAKE 1 CAPSULE BY MOUTH ONCE DAILY 30 Cap 0   • sildenafil (REVATIO) 20 MG tablet Take 1 Tab by mouth 1 time daily as needed. 30 Tab 0   • fluticasone (FLONASE) 50 MCG/ACT nasal spray SPRAYS 2 SPRAYS IN EACH NOSTRIL QD  0     No current facility-administered medications for this visit.        Social History   Substance Use Topics   • Smoking status: Never Smoker   • Smokeless tobacco: Never Used   • Alcohol use No       Family History   Problem Relation Age of Onset   • Arthritis Father    • Heart Disease Maternal Uncle    • Psychiatry Paternal Aunt    • Heart Disease Maternal Grandmother    • Lung Disease Maternal Grandfather    • Cancer Maternal Grandfather    • Cancer Paternal Grandfather        ROS:  Review of Systems   Constitutional: Negative for fever, chills, weight loss  Eyes: Negative for blurred vision.   Respiratory: Negative for cough, sputum production, shortness of breath and wheezing.    Cardiovascular: Negative for chest pain, palpitations, orthopnea and leg swelling.   Musculoskeletal: Negative for myalgias, back pain and joint  "pain.   Endo/Heme/Allergies: Does not bruise/bleed easily.               Exam:  /68 (BP Location: Left arm, Patient Position: Sitting, BP Cuff Size: Adult)   Pulse 96   Temp 36.8 °C (98.2 °F) (Temporal)   Resp 16   Ht 1.905 m (6' 3\")   Wt 106.6 kg (235 lb)   SpO2 95%   General:  Well nourished, well developed male in NAD  Head is grossly normal.  Neck: Supple without JVD or bruit. Thyroid is not enlarged. Trachea is midline.  Slight swelling is noted over the upper edge of the left sternocleidomastoid muscle along with slight lymphadenopathy in that same region just below the proximal left mandible.  Oropharynx-slightly pink posteriorly without exudate.  Tongue is midline  Pulmonary: Clear to ausculation .  Normal effort. No rales, ronchi, or wheezing.  Cardiovascular: Regular rate and rhythm without murmur.  Abdomen-Abdomen is soft, normal bowel sounds, no masses, guarding, ororganomegaly, or tenderness.  Lower extremities- neg for pretibial edema, redness, tenderness.  Psych-normal affect with good eye contact. Normal grooming. Oriented x4.    PHQ 9-23  Please note that this dictation was created using voice recognition software. I have made every reasonable attempt to correct obvious errors, but I expect that there are errors of grammar and possibly content that I did not discover before finalizing the note.    Assessment/Plan:  1. Testosterone deficiency in male  Testosterone (ANDROGEL PUMP) 20.25 MG/ACT (1.62%) Gel   2. Bipolar affective disorder, currently depressed, mild (HCC)     3. GERD with esophagitis     4. Acute URI     5. Testosterone insufficiency     6. Gastroesophageal reflux disease with esophagitis     7. Bipolar affective disorder, current episode depressed, current episode severity unspecified (HCC)       Plan: 1.  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  Continue psychiatric follow-up  3.  Continue GI follow-up  4.  Renew " AndroGel pump  5.  Testosterone level being drawn today on his psychiatry ordered labs-[facility]

## 2019-04-15 NOTE — ASSESSMENT & PLAN NOTE
Patient is taking 2 pumps daily of AndroGel 20.25 mg per ACT-1.62% gel.  Reports sexual drive has improved.  He still notes prolonged delayed ejaculation likely secondary to his 60 mg dose of fluoxetine.  Not reporting irritability as he did with previous higher doses of this medication.

## 2019-04-15 NOTE — ASSESSMENT & PLAN NOTE
Patient is followed by Cristina Duran, psychiatry, for bipolar mood disorder, anxiety, depression.  He is currently taking 60 mg of fluoxetine, along with 750 mg twice daily of Depakote and 1 mg 3 times a day (PRN) of lorazepam.  PHQ 9 score today is 23.  Patient does note ongoing symptoms of sadness and depression.  Patient notes intermittent suicidal ideation.

## 2019-04-15 NOTE — ASSESSMENT & PLAN NOTE
Patient reports she is recently switched for formulary reasons to pantoprazole which she feels is having moderate benefit with regard to heartburn symptoms.  Reports no active substernal burning or epigastric burning at this time.  Is apparently being scheduled in the next month for upper and lower endoscopy by [facility]-Dr. Calero.  Has reported occasional scant red blood in his stools.

## 2019-04-15 NOTE — LETTER
April 15, 2019    Re:    Jared Lutz Ifrah  [address]  [place] [address]        Dear Sir,    Jared is excused 4/15/19 through 4/17/19 due to acute illness    .        Sincerely,        Jemal Crocker M.D.    Electronically Signed

## 2019-05-14 DIAGNOSIS — E29.1 TESTOSTERONE DEFICIENCY IN MALE: ICD-10-CM

## 2019-05-14 RX ORDER — TESTOSTERONE 16.2 MG/G
GEL TRANSDERMAL
Qty: 1 BOTTLE | Refills: 11 | Status: SHIPPED | OUTPATIENT
Start: 2019-05-14 | End: 2019-06-15

## 2019-06-17 ENCOUNTER — OFFICE VISIT (OUTPATIENT)
Dept: MEDICAL GROUP | Facility: MEDICAL CENTER | Age: 49
End: 2019-06-17
Attending: FAMILY MEDICINE
Payer: MEDICARE

## 2019-06-17 VITALS
SYSTOLIC BLOOD PRESSURE: 122 MMHG | RESPIRATION RATE: 16 BRPM | HEART RATE: 104 BPM | TEMPERATURE: 98.5 F | DIASTOLIC BLOOD PRESSURE: 80 MMHG | WEIGHT: 235 LBS | OXYGEN SATURATION: 97 % | HEIGHT: 75 IN | BODY MASS INDEX: 29.22 KG/M2

## 2019-06-17 DIAGNOSIS — M54.12 CERVICAL RADICULOPATHY AT C6: ICD-10-CM

## 2019-06-17 PROCEDURE — 99213 OFFICE O/P EST LOW 20 MIN: CPT | Performed by: FAMILY MEDICINE

## 2019-06-17 PROCEDURE — 99212 OFFICE O/P EST SF 10 MIN: CPT | Performed by: FAMILY MEDICINE
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
## 2019-06-28 ENCOUNTER — HOSPITAL ENCOUNTER (OUTPATIENT)
Dept: RADIOLOGY | Facility: MEDICAL CENTER | Age: 49
End: 2019-06-28
Attending: FAMILY MEDICINE
Payer: MEDICARE

## 2019-06-28 ENCOUNTER — TELEPHONE (OUTPATIENT)
Dept: MEDICAL GROUP | Facility: MEDICAL CENTER | Age: 49
End: 2019-06-28

## 2019-06-28 DIAGNOSIS — M54.12 CERVICAL RADICULOPATHY AT C6: ICD-10-CM

## 2019-06-28 PROCEDURE — 72141 MRI NECK SPINE W/O DYE: CPT

## 2019-07-08 ENCOUNTER — HOSPITAL ENCOUNTER (OUTPATIENT)
Dept: RADIOLOGY | Facility: MEDICAL CENTER | Age: 49
End: 2019-07-08
Attending: PHYSICAL MEDICINE & REHABILITATION
Payer: MEDICARE

## 2019-07-08 ENCOUNTER — OFFICE VISIT (OUTPATIENT)
Dept: PHYSICAL MEDICINE AND REHAB | Facility: MEDICAL CENTER | Age: 49
End: 2019-07-08
Payer: MEDICARE

## 2019-07-08 VITALS
OXYGEN SATURATION: 94 % | DIASTOLIC BLOOD PRESSURE: 80 MMHG | BODY MASS INDEX: 29.66 KG/M2 | HEART RATE: 108 BPM | TEMPERATURE: 97.5 F | WEIGHT: 238.54 LBS | SYSTOLIC BLOOD PRESSURE: 120 MMHG | HEIGHT: 75 IN

## 2019-07-08 DIAGNOSIS — M54.12 CERVICAL RADICULITIS: ICD-10-CM

## 2019-07-08 DIAGNOSIS — M50.20 CERVICAL HERNIATED DISC: ICD-10-CM

## 2019-07-08 DIAGNOSIS — M79.18 MYOFASCIAL PAIN: ICD-10-CM

## 2019-07-08 DIAGNOSIS — R26.9 IMPAIRED GAIT: ICD-10-CM

## 2019-07-08 DIAGNOSIS — M79.2 NERVE PAIN: ICD-10-CM

## 2019-07-08 DIAGNOSIS — M47.812 SPONDYLOSIS OF CERVICAL REGION WITHOUT MYELOPATHY OR RADICULOPATHY: ICD-10-CM

## 2019-07-08 DIAGNOSIS — M54.2 NECK PAIN: ICD-10-CM

## 2019-07-08 PROCEDURE — 72050 X-RAY EXAM NECK SPINE 4/5VWS: CPT

## 2019-07-08 PROCEDURE — 99204 OFFICE O/P NEW MOD 45 MIN: CPT | Performed by: PHYSICAL MEDICINE & REHABILITATION

## 2019-07-08 RX ORDER — PANTOPRAZOLE SODIUM 40 MG/1
40 TABLET, DELAYED RELEASE ORAL DAILY
Refills: 11 | COMMUNITY
Start: 2019-06-10

## 2019-07-08 RX ORDER — TESTOSTERONE 16.2 MG/G
GEL TRANSDERMAL
Refills: 5 | COMMUNITY
Start: 2019-06-18 | End: 2019-07-24

## 2019-07-08 RX ORDER — BREXPIPRAZOLE 1 MG/1
1 TABLET ORAL EVERY EVENING
COMMUNITY
Start: 2019-07-05 | End: 2019-08-06

## 2019-07-08 RX ORDER — CICLOPIROX 80 MG/ML
SOLUTION TOPICAL
COMMUNITY
Start: 2019-07-01 | End: 2019-07-24

## 2019-07-08 RX ORDER — DIVALPROEX SODIUM 250 MG/1
TABLET, EXTENDED RELEASE ORAL
Refills: 1 | COMMUNITY
Start: 2019-04-25 | End: 2019-07-24

## 2019-07-08 RX ORDER — BREXPIPRAZOLE 0.5 MG/1
TABLET ORAL
Refills: 0 | COMMUNITY
Start: 2019-06-27 | End: 2019-07-24

## 2019-07-08 ASSESSMENT — ENCOUNTER SYMPTOMS
BACK PAIN: 0
HEMOPTYSIS: 0
NECK PAIN: 1
SENSORY CHANGE: 1
ORTHOPNEA: 0
EYE DISCHARGE: 0
SPUTUM PRODUCTION: 0
PALPITATIONS: 0
MYALGIAS: 1
CHILLS: 0
FEVER: 0
TINGLING: 1
DIARRHEA: 0
PHOTOPHOBIA: 0
EYE PAIN: 0

## 2019-07-08 ASSESSMENT — PATIENT HEALTH QUESTIONNAIRE - PHQ9
SUM OF ALL RESPONSES TO PHQ QUESTIONS 1-9: 9
5. POOR APPETITE OR OVEREATING: 1 - SEVERAL DAYS
CLINICAL INTERPRETATION OF PHQ2 SCORE: 2
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
## 2019-07-24 DIAGNOSIS — Z01.810 PRE-OPERATIVE CARDIOVASCULAR EXAMINATION: ICD-10-CM

## 2019-07-24 DIAGNOSIS — Z01.812 PRE-OPERATIVE LABORATORY EXAMINATION: ICD-10-CM

## 2019-07-24 LAB
ABO GROUP BLD: NORMAL
ANION GAP SERPL CALC-SCNC: 11 MMOL/L (ref 0–11.9)
APPEARANCE UR: CLEAR
APTT PPP: 28.1 SEC (ref 24.7–36)
BASOPHILS # BLD AUTO: 0.6 % (ref 0–1.8)
BASOPHILS # BLD: 0.05 K/UL (ref 0–0.12)
BILIRUB UR QL STRIP.AUTO: NEGATIVE
BLD GP AB SCN SERPL QL: NORMAL
BUN SERPL-MCNC: 15 MG/DL (ref 8–22)
CALCIUM SERPL-MCNC: 9.4 MG/DL (ref 8.5–10.5)
CHLORIDE SERPL-SCNC: 101 MMOL/L (ref 96–112)
CO2 SERPL-SCNC: 26 MMOL/L (ref 20–33)
COLOR UR: YELLOW
CREAT SERPL-MCNC: 1.06 MG/DL (ref 0.5–1.4)
EKG IMPRESSION: NORMAL
EOSINOPHIL # BLD AUTO: 0.12 K/UL (ref 0–0.51)
EOSINOPHIL NFR BLD: 1.5 % (ref 0–6.9)
ERYTHROCYTE [DISTWIDTH] IN BLOOD BY AUTOMATED COUNT: 42.6 FL (ref 35.9–50)
GLUCOSE SERPL-MCNC: 95 MG/DL (ref 65–99)
GLUCOSE UR STRIP.AUTO-MCNC: NEGATIVE MG/DL
HCT VFR BLD AUTO: 50.5 % (ref 42–52)
HGB BLD-MCNC: 16.9 G/DL (ref 14–18)
IMM GRANULOCYTES # BLD AUTO: 0.04 K/UL (ref 0–0.11)
IMM GRANULOCYTES NFR BLD AUTO: 0.5 % (ref 0–0.9)
INR PPP: 0.99 (ref 0.87–1.13)
KETONES UR STRIP.AUTO-MCNC: ABNORMAL MG/DL
LEUKOCYTE ESTERASE UR QL STRIP.AUTO: NEGATIVE
LYMPHOCYTES # BLD AUTO: 3.31 K/UL (ref 1–4.8)
LYMPHOCYTES NFR BLD: 42.1 % (ref 22–41)
MCH RBC QN AUTO: 30 PG (ref 27–33)
MCHC RBC AUTO-ENTMCNC: 33.5 G/DL (ref 33.7–35.3)
MCV RBC AUTO: 89.5 FL (ref 81.4–97.8)
MICRO URNS: ABNORMAL
MONOCYTES # BLD AUTO: 0.71 K/UL (ref 0–0.85)
MONOCYTES NFR BLD AUTO: 9 % (ref 0–13.4)
NEUTROPHILS # BLD AUTO: 3.64 K/UL (ref 1.82–7.42)
NEUTROPHILS NFR BLD: 46.3 % (ref 44–72)
NITRITE UR QL STRIP.AUTO: NEGATIVE
NRBC # BLD AUTO: 0 K/UL
NRBC BLD-RTO: 0 /100 WBC
PH UR STRIP.AUTO: 6 [PH]
PLATELET # BLD AUTO: 247 K/UL (ref 164–446)
PMV BLD AUTO: 8.9 FL (ref 9–12.9)
POTASSIUM SERPL-SCNC: 4.7 MMOL/L (ref 3.6–5.5)
PROT UR QL STRIP: NEGATIVE MG/DL
PROTHROMBIN TIME: 13.3 SEC (ref 12–14.6)
RBC # BLD AUTO: 5.64 M/UL (ref 4.7–6.1)
RBC UR QL AUTO: NEGATIVE
RH BLD: NORMAL
SODIUM SERPL-SCNC: 138 MMOL/L (ref 135–145)
SP GR UR STRIP.AUTO: 1.01
UROBILINOGEN UR STRIP.AUTO-MCNC: 0.2 MG/DL
WBC # BLD AUTO: 7.9 K/UL (ref 4.8–10.8)

## 2019-07-24 PROCEDURE — 85730 THROMBOPLASTIN TIME PARTIAL: CPT

## 2019-07-24 PROCEDURE — 93005 ELECTROCARDIOGRAM TRACING: CPT

## 2019-07-24 PROCEDURE — 93010 ELECTROCARDIOGRAM REPORT: CPT | Performed by: INTERNAL MEDICINE

## 2019-07-24 PROCEDURE — 86900 BLOOD TYPING SEROLOGIC ABO: CPT

## 2019-07-24 PROCEDURE — 85025 COMPLETE CBC W/AUTO DIFF WBC: CPT

## 2019-07-24 PROCEDURE — 85610 PROTHROMBIN TIME: CPT

## 2019-07-24 PROCEDURE — 86901 BLOOD TYPING SEROLOGIC RH(D): CPT

## 2019-07-24 PROCEDURE — 81003 URINALYSIS AUTO W/O SCOPE: CPT

## 2019-07-24 PROCEDURE — 36415 COLL VENOUS BLD VENIPUNCTURE: CPT

## 2019-07-24 PROCEDURE — 86850 RBC ANTIBODY SCREEN: CPT

## 2019-07-24 PROCEDURE — 80048 BASIC METABOLIC PNL TOTAL CA: CPT

## 2019-07-24 RX ORDER — FLUOXETINE HYDROCHLORIDE 40 MG/1
80 CAPSULE ORAL DAILY
COMMUNITY

## 2019-07-26 ENCOUNTER — TELEPHONE (OUTPATIENT)
Dept: MEDICAL GROUP | Facility: MEDICAL CENTER | Age: 49
End: 2019-07-26

## 2019-07-26 DIAGNOSIS — L70.0 ACNE VULGARIS: ICD-10-CM

## 2019-07-26 RX ORDER — TRETINOIN 1 MG/G
CREAM TOPICAL
Qty: 45 G | Refills: 0 | Status: SHIPPED | OUTPATIENT
Start: 2019-07-26 | End: 2019-07-31

## 2019-07-26 NOTE — TELEPHONE ENCOUNTER
Pt called in, would like to know the results of his EKG, states she does not understand the results on his mychart.  Thank you, please advise.

## 2019-07-29 NOTE — TELEPHONE ENCOUNTER
Jared, the functional status of your EKG is that it is acceptable for presurgical clearance.  There are some minor electrical findings that Dr. Alamo commented on in the reading which I have not seen before.  Therefore, I cannot give you a full discussion about their origins.  They are not red flag issues.  At some point we could repeat your EKG study to see if they are even persistent.  Dr. COLEMAN

## 2019-07-30 ENCOUNTER — PATIENT MESSAGE (OUTPATIENT)
Dept: MEDICAL GROUP | Facility: MEDICAL CENTER | Age: 49
End: 2019-07-30

## 2019-07-31 ENCOUNTER — ANESTHESIA EVENT (OUTPATIENT)
Dept: SURGERY | Facility: MEDICAL CENTER | Age: 49
End: 2019-07-31
Payer: MEDICARE

## 2019-07-31 ENCOUNTER — APPOINTMENT (OUTPATIENT)
Dept: RADIOLOGY | Facility: MEDICAL CENTER | Age: 49
End: 2019-07-31
Attending: NEUROLOGICAL SURGERY
Payer: MEDICARE

## 2019-07-31 ENCOUNTER — HOSPITAL ENCOUNTER (OUTPATIENT)
Facility: MEDICAL CENTER | Age: 49
End: 2019-08-01
Attending: NEUROLOGICAL SURGERY | Admitting: NEUROLOGICAL SURGERY
Payer: MEDICARE

## 2019-07-31 ENCOUNTER — ANESTHESIA (OUTPATIENT)
Dept: SURGERY | Facility: MEDICAL CENTER | Age: 49
End: 2019-07-31
Payer: MEDICARE

## 2019-07-31 LAB — ABO + RH BLD: NORMAL

## 2019-07-31 PROCEDURE — 160048 HCHG OR STATISTICAL LEVEL 1-5: Performed by: NEUROLOGICAL SURGERY

## 2019-07-31 PROCEDURE — A9270 NON-COVERED ITEM OR SERVICE: HCPCS | Performed by: ANESTHESIOLOGY

## 2019-07-31 PROCEDURE — 700102 HCHG RX REV CODE 250 W/ 637 OVERRIDE(OP): Performed by: NEUROLOGICAL SURGERY

## 2019-07-31 PROCEDURE — 160002 HCHG RECOVERY MINUTES (STAT): Performed by: NEUROLOGICAL SURGERY

## 2019-07-31 PROCEDURE — 700112 HCHG RX REV CODE 229: Performed by: NEUROLOGICAL SURGERY

## 2019-07-31 PROCEDURE — 500002 HCHG ADHESIVE, DERMABOND: Performed by: NEUROLOGICAL SURGERY

## 2019-07-31 PROCEDURE — 500367 HCHG DRAIN KIT, HEMOVAC: Performed by: NEUROLOGICAL SURGERY

## 2019-07-31 PROCEDURE — 72040 X-RAY EXAM NECK SPINE 2-3 VW: CPT

## 2019-07-31 PROCEDURE — 160036 HCHG PACU - EA ADDL 30 MINS PHASE I: Performed by: NEUROLOGICAL SURGERY

## 2019-07-31 PROCEDURE — 500864 HCHG NEEDLE, SPINAL 18G: Performed by: NEUROLOGICAL SURGERY

## 2019-07-31 PROCEDURE — 160035 HCHG PACU - 1ST 60 MINS PHASE I: Performed by: NEUROLOGICAL SURGERY

## 2019-07-31 PROCEDURE — C1713 ANCHOR/SCREW BN/BN,TIS/BN: HCPCS | Performed by: NEUROLOGICAL SURGERY

## 2019-07-31 PROCEDURE — 700102 HCHG RX REV CODE 250 W/ 637 OVERRIDE(OP): Performed by: ANESTHESIOLOGY

## 2019-07-31 PROCEDURE — A9270 NON-COVERED ITEM OR SERVICE: HCPCS | Performed by: NEUROLOGICAL SURGERY

## 2019-07-31 PROCEDURE — 96365 THER/PROPH/DIAG IV INF INIT: CPT

## 2019-07-31 PROCEDURE — 700101 HCHG RX REV CODE 250: Performed by: ANESTHESIOLOGY

## 2019-07-31 PROCEDURE — 160029 HCHG SURGERY MINUTES - 1ST 30 MINS LEVEL 4: Performed by: NEUROLOGICAL SURGERY

## 2019-07-31 PROCEDURE — 502000 HCHG MISC OR IMPLANTS RC 0278: Performed by: NEUROLOGICAL SURGERY

## 2019-07-31 PROCEDURE — 700101 HCHG RX REV CODE 250: Performed by: NEUROLOGICAL SURGERY

## 2019-07-31 PROCEDURE — 700111 HCHG RX REV CODE 636 W/ 250 OVERRIDE (IP): Performed by: ANESTHESIOLOGY

## 2019-07-31 PROCEDURE — 501838 HCHG SUTURE GENERAL: Performed by: NEUROLOGICAL SURGERY

## 2019-07-31 PROCEDURE — 160041 HCHG SURGERY MINUTES - EA ADDL 1 MIN LEVEL 4: Performed by: NEUROLOGICAL SURGERY

## 2019-07-31 PROCEDURE — 700111 HCHG RX REV CODE 636 W/ 250 OVERRIDE (IP): Performed by: NEUROLOGICAL SURGERY

## 2019-07-31 PROCEDURE — 160009 HCHG ANES TIME/MIN: Performed by: NEUROLOGICAL SURGERY

## 2019-07-31 PROCEDURE — A6402 STERILE GAUZE <= 16 SQ IN: HCPCS | Performed by: NEUROLOGICAL SURGERY

## 2019-07-31 PROCEDURE — G0378 HOSPITAL OBSERVATION PER HR: HCPCS

## 2019-07-31 DEVICE — IMPLANTABLE DEVICE: Type: IMPLANTABLE DEVICE | Status: FUNCTIONAL

## 2019-07-31 RX ORDER — AMOXICILLIN 250 MG
1 CAPSULE ORAL
Status: DISCONTINUED | OUTPATIENT
Start: 2019-07-31 | End: 2019-08-01 | Stop reason: HOSPADM

## 2019-07-31 RX ORDER — POLYETHYLENE GLYCOL 3350 17 G/17G
1 POWDER, FOR SOLUTION ORAL 2 TIMES DAILY PRN
Status: DISCONTINUED | OUTPATIENT
Start: 2019-07-31 | End: 2019-08-01 | Stop reason: HOSPADM

## 2019-07-31 RX ORDER — ATOMOXETINE 60 MG/1
60 CAPSULE ORAL
Status: DISCONTINUED | OUTPATIENT
Start: 2019-07-31 | End: 2019-08-01 | Stop reason: HOSPADM

## 2019-07-31 RX ORDER — PANTOPRAZOLE SODIUM 40 MG/1
40 TABLET, DELAYED RELEASE ORAL DAILY
Status: DISCONTINUED | OUTPATIENT
Start: 2019-07-31 | End: 2019-07-31

## 2019-07-31 RX ORDER — DIPHENHYDRAMINE HYDROCHLORIDE 50 MG/ML
12.5 INJECTION INTRAMUSCULAR; INTRAVENOUS
Status: DISCONTINUED | OUTPATIENT
Start: 2019-07-31 | End: 2019-07-31 | Stop reason: HOSPADM

## 2019-07-31 RX ORDER — BACITRACIN 50000 [IU]/1
INJECTION, POWDER, FOR SOLUTION INTRAMUSCULAR
Status: DISCONTINUED | OUTPATIENT
Start: 2019-07-31 | End: 2019-07-31 | Stop reason: HOSPADM

## 2019-07-31 RX ORDER — TRETINOIN 1 MG/G
1 CREAM TOPICAL PRN
Status: DISCONTINUED | OUTPATIENT
Start: 2019-07-31 | End: 2019-07-31

## 2019-07-31 RX ORDER — OXYCODONE HYDROCHLORIDE 10 MG/1
10 TABLET ORAL
Status: DISCONTINUED | OUTPATIENT
Start: 2019-07-31 | End: 2019-08-01 | Stop reason: HOSPADM

## 2019-07-31 RX ORDER — DIPHENHYDRAMINE HYDROCHLORIDE 50 MG/ML
25 INJECTION INTRAMUSCULAR; INTRAVENOUS EVERY 6 HOURS PRN
Status: DISCONTINUED | OUTPATIENT
Start: 2019-07-31 | End: 2019-08-01 | Stop reason: HOSPADM

## 2019-07-31 RX ORDER — ONDANSETRON 2 MG/ML
4 INJECTION INTRAMUSCULAR; INTRAVENOUS EVERY 4 HOURS PRN
Status: DISCONTINUED | OUTPATIENT
Start: 2019-07-31 | End: 2019-08-01 | Stop reason: HOSPADM

## 2019-07-31 RX ORDER — BISACODYL 10 MG
10 SUPPOSITORY, RECTAL RECTAL
Status: DISCONTINUED | OUTPATIENT
Start: 2019-07-31 | End: 2019-08-01 | Stop reason: HOSPADM

## 2019-07-31 RX ORDER — HALOPERIDOL 5 MG/ML
1 INJECTION INTRAMUSCULAR
Status: DISCONTINUED | OUTPATIENT
Start: 2019-07-31 | End: 2019-07-31 | Stop reason: HOSPADM

## 2019-07-31 RX ORDER — MORPHINE SULFATE 4 MG/ML
4 INJECTION, SOLUTION INTRAMUSCULAR; INTRAVENOUS
Status: DISCONTINUED | OUTPATIENT
Start: 2019-07-31 | End: 2019-08-01 | Stop reason: HOSPADM

## 2019-07-31 RX ORDER — DEXAMETHASONE SODIUM PHOSPHATE 4 MG/ML
INJECTION, SOLUTION INTRA-ARTICULAR; INTRALESIONAL; INTRAMUSCULAR; INTRAVENOUS; SOFT TISSUE PRN
Status: DISCONTINUED | OUTPATIENT
Start: 2019-07-31 | End: 2019-07-31 | Stop reason: SURG

## 2019-07-31 RX ORDER — PROMETHAZINE HYDROCHLORIDE 25 MG/1
12.5-25 TABLET ORAL EVERY 4 HOURS PRN
Status: DISCONTINUED | OUTPATIENT
Start: 2019-07-31 | End: 2019-08-01 | Stop reason: HOSPADM

## 2019-07-31 RX ORDER — MIDAZOLAM HYDROCHLORIDE 1 MG/ML
1 INJECTION INTRAMUSCULAR; INTRAVENOUS
Status: DISCONTINUED | OUTPATIENT
Start: 2019-07-31 | End: 2019-07-31 | Stop reason: HOSPADM

## 2019-07-31 RX ORDER — ONDANSETRON 2 MG/ML
4 INJECTION INTRAMUSCULAR; INTRAVENOUS
Status: DISCONTINUED | OUTPATIENT
Start: 2019-07-31 | End: 2019-07-31 | Stop reason: HOSPADM

## 2019-07-31 RX ORDER — TESTOSTERONE 20.25 MG/1.25G
20.25 GEL TOPICAL DAILY
Status: DISCONTINUED | OUTPATIENT
Start: 2019-07-31 | End: 2019-08-01 | Stop reason: HOSPADM

## 2019-07-31 RX ORDER — MEPERIDINE HYDROCHLORIDE 25 MG/ML
6.25 INJECTION INTRAMUSCULAR; INTRAVENOUS; SUBCUTANEOUS
Status: DISCONTINUED | OUTPATIENT
Start: 2019-07-31 | End: 2019-07-31 | Stop reason: HOSPADM

## 2019-07-31 RX ORDER — HYDROCODONE BITARTRATE AND ACETAMINOPHEN 5; 325 MG/1; MG/1
1 TABLET ORAL EVERY 4 HOURS PRN
Status: DISCONTINUED | OUTPATIENT
Start: 2019-07-31 | End: 2019-08-01 | Stop reason: HOSPADM

## 2019-07-31 RX ORDER — CEFAZOLIN SODIUM 2 G/100ML
2 INJECTION, SOLUTION INTRAVENOUS EVERY 8 HOURS
Status: COMPLETED | OUTPATIENT
Start: 2019-07-31 | End: 2019-07-31

## 2019-07-31 RX ORDER — OMEPRAZOLE 20 MG/1
20 CAPSULE, DELAYED RELEASE ORAL DAILY
Status: DISCONTINUED | OUTPATIENT
Start: 2019-08-01 | End: 2019-08-01 | Stop reason: HOSPADM

## 2019-07-31 RX ORDER — HYDRALAZINE HYDROCHLORIDE 20 MG/ML
5 INJECTION INTRAMUSCULAR; INTRAVENOUS
Status: DISCONTINUED | OUTPATIENT
Start: 2019-07-31 | End: 2019-07-31 | Stop reason: HOSPADM

## 2019-07-31 RX ORDER — PROMETHAZINE HYDROCHLORIDE 25 MG/1
12.5-25 SUPPOSITORY RECTAL EVERY 4 HOURS PRN
Status: DISCONTINUED | OUTPATIENT
Start: 2019-07-31 | End: 2019-08-01 | Stop reason: HOSPADM

## 2019-07-31 RX ORDER — FLUTICASONE PROPIONATE 50 MCG
2 SPRAY, SUSPENSION (ML) NASAL DAILY
Status: DISCONTINUED | OUTPATIENT
Start: 2019-07-31 | End: 2019-08-01 | Stop reason: HOSPADM

## 2019-07-31 RX ORDER — SODIUM CHLORIDE, SODIUM LACTATE, POTASSIUM CHLORIDE, CALCIUM CHLORIDE 600; 310; 30; 20 MG/100ML; MG/100ML; MG/100ML; MG/100ML
INJECTION, SOLUTION INTRAVENOUS CONTINUOUS
Status: ACTIVE | OUTPATIENT
Start: 2019-07-31 | End: 2019-07-31

## 2019-07-31 RX ORDER — HYDROMORPHONE HYDROCHLORIDE 1 MG/ML
0.2 INJECTION, SOLUTION INTRAMUSCULAR; INTRAVENOUS; SUBCUTANEOUS
Status: DISCONTINUED | OUTPATIENT
Start: 2019-07-31 | End: 2019-07-31 | Stop reason: HOSPADM

## 2019-07-31 RX ORDER — LABETALOL HYDROCHLORIDE 5 MG/ML
5 INJECTION, SOLUTION INTRAVENOUS
Status: DISCONTINUED | OUTPATIENT
Start: 2019-07-31 | End: 2019-07-31 | Stop reason: HOSPADM

## 2019-07-31 RX ORDER — BUPIVACAINE HYDROCHLORIDE AND EPINEPHRINE 5; 5 MG/ML; UG/ML
INJECTION, SOLUTION EPIDURAL; INTRACAUDAL; PERINEURAL
Status: DISCONTINUED | OUTPATIENT
Start: 2019-07-31 | End: 2019-07-31 | Stop reason: HOSPADM

## 2019-07-31 RX ORDER — ACETAMINOPHEN 500 MG
500-1000 TABLET ORAL EVERY 6 HOURS PRN
COMMUNITY

## 2019-07-31 RX ORDER — HYDROMORPHONE HYDROCHLORIDE 1 MG/ML
0.4 INJECTION, SOLUTION INTRAMUSCULAR; INTRAVENOUS; SUBCUTANEOUS
Status: DISCONTINUED | OUTPATIENT
Start: 2019-07-31 | End: 2019-07-31 | Stop reason: HOSPADM

## 2019-07-31 RX ORDER — METOPROLOL TARTRATE 1 MG/ML
1 INJECTION, SOLUTION INTRAVENOUS
Status: DISCONTINUED | OUTPATIENT
Start: 2019-07-31 | End: 2019-07-31 | Stop reason: HOSPADM

## 2019-07-31 RX ORDER — CYCLOBENZAPRINE HCL 10 MG
10 TABLET ORAL EVERY 8 HOURS PRN
Status: DISCONTINUED | OUTPATIENT
Start: 2019-07-31 | End: 2019-08-01 | Stop reason: HOSPADM

## 2019-07-31 RX ORDER — ENEMA 19; 7 G/133ML; G/133ML
1 ENEMA RECTAL
Status: DISCONTINUED | OUTPATIENT
Start: 2019-07-31 | End: 2019-08-01 | Stop reason: HOSPADM

## 2019-07-31 RX ORDER — DOCUSATE SODIUM 100 MG/1
100 CAPSULE, LIQUID FILLED ORAL 2 TIMES DAILY
Status: DISCONTINUED | OUTPATIENT
Start: 2019-07-31 | End: 2019-08-01 | Stop reason: HOSPADM

## 2019-07-31 RX ORDER — OXYCODONE HYDROCHLORIDE AND ACETAMINOPHEN 5; 325 MG/1; MG/1
2 TABLET ORAL
Status: COMPLETED | OUTPATIENT
Start: 2019-07-31 | End: 2019-07-31

## 2019-07-31 RX ORDER — HYDROMORPHONE HYDROCHLORIDE 1 MG/ML
0.1 INJECTION, SOLUTION INTRAMUSCULAR; INTRAVENOUS; SUBCUTANEOUS
Status: DISCONTINUED | OUTPATIENT
Start: 2019-07-31 | End: 2019-07-31 | Stop reason: HOSPADM

## 2019-07-31 RX ORDER — SODIUM CHLORIDE AND POTASSIUM CHLORIDE 150; 900 MG/100ML; MG/100ML
INJECTION, SOLUTION INTRAVENOUS CONTINUOUS
Status: DISCONTINUED | OUTPATIENT
Start: 2019-07-31 | End: 2019-08-01 | Stop reason: HOSPADM

## 2019-07-31 RX ORDER — ATOMOXETINE 60 MG/1
60 CAPSULE ORAL DAILY
Status: DISCONTINUED | OUTPATIENT
Start: 2019-08-01 | End: 2019-07-31

## 2019-07-31 RX ORDER — CALCIUM CARBONATE 500 MG/1
500 TABLET, CHEWABLE ORAL 2 TIMES DAILY
Status: DISCONTINUED | OUTPATIENT
Start: 2019-07-31 | End: 2019-08-01 | Stop reason: HOSPADM

## 2019-07-31 RX ORDER — TRETINOIN 1 MG/G
1 CREAM TOPICAL PRN
COMMUNITY

## 2019-07-31 RX ORDER — AMOXICILLIN 250 MG
1 CAPSULE ORAL NIGHTLY
Status: DISCONTINUED | OUTPATIENT
Start: 2019-07-31 | End: 2019-08-01 | Stop reason: HOSPADM

## 2019-07-31 RX ORDER — CEFAZOLIN SODIUM 1 G/3ML
INJECTION, POWDER, FOR SOLUTION INTRAMUSCULAR; INTRAVENOUS PRN
Status: DISCONTINUED | OUTPATIENT
Start: 2019-07-31 | End: 2019-07-31 | Stop reason: SURG

## 2019-07-31 RX ORDER — ONDANSETRON 4 MG/1
4 TABLET, ORALLY DISINTEGRATING ORAL EVERY 4 HOURS PRN
Status: DISCONTINUED | OUTPATIENT
Start: 2019-07-31 | End: 2019-08-01 | Stop reason: HOSPADM

## 2019-07-31 RX ORDER — DIPHENHYDRAMINE HCL 25 MG
25 TABLET ORAL EVERY 6 HOURS PRN
Status: DISCONTINUED | OUTPATIENT
Start: 2019-07-31 | End: 2019-08-01 | Stop reason: HOSPADM

## 2019-07-31 RX ORDER — LORAZEPAM 1 MG/1
1 TABLET ORAL EVERY 8 HOURS PRN
Status: DISCONTINUED | OUTPATIENT
Start: 2019-07-31 | End: 2019-08-01 | Stop reason: HOSPADM

## 2019-07-31 RX ORDER — ONDANSETRON 2 MG/ML
INJECTION INTRAMUSCULAR; INTRAVENOUS PRN
Status: DISCONTINUED | OUTPATIENT
Start: 2019-07-31 | End: 2019-07-31 | Stop reason: SURG

## 2019-07-31 RX ORDER — ATOMOXETINE 60 MG/1
60 CAPSULE ORAL DAILY
COMMUNITY

## 2019-07-31 RX ORDER — OXYCODONE HYDROCHLORIDE AND ACETAMINOPHEN 5; 325 MG/1; MG/1
1 TABLET ORAL
Status: COMPLETED | OUTPATIENT
Start: 2019-07-31 | End: 2019-07-31

## 2019-07-31 RX ORDER — SODIUM CHLORIDE, SODIUM LACTATE, POTASSIUM CHLORIDE, CALCIUM CHLORIDE 600; 310; 30; 20 MG/100ML; MG/100ML; MG/100ML; MG/100ML
INJECTION, SOLUTION INTRAVENOUS CONTINUOUS
Status: DISCONTINUED | OUTPATIENT
Start: 2019-07-31 | End: 2019-07-31 | Stop reason: HOSPADM

## 2019-07-31 RX ORDER — FLUOXETINE HYDROCHLORIDE 20 MG/1
80 CAPSULE ORAL DAILY
Status: DISCONTINUED | OUTPATIENT
Start: 2019-07-31 | End: 2019-08-01 | Stop reason: HOSPADM

## 2019-07-31 RX ADMIN — HYDROMORPHONE HYDROCHLORIDE 0.4 MG: 1 INJECTION, SOLUTION INTRAMUSCULAR; INTRAVENOUS; SUBCUTANEOUS at 10:18

## 2019-07-31 RX ADMIN — FENTANYL CITRATE 250 MCG: 50 INJECTION, SOLUTION INTRAMUSCULAR; INTRAVENOUS at 07:39

## 2019-07-31 RX ADMIN — HYDROMORPHONE HYDROCHLORIDE 0.2 MG: 1 INJECTION, SOLUTION INTRAMUSCULAR; INTRAVENOUS; SUBCUTANEOUS at 09:45

## 2019-07-31 RX ADMIN — SENNOSIDES, DOCUSATE SODIUM 1 TABLET: 50; 8.6 TABLET, FILM COATED ORAL at 21:21

## 2019-07-31 RX ADMIN — PROPOFOL 200 MG: 10 INJECTION, EMULSION INTRAVENOUS at 07:39

## 2019-07-31 RX ADMIN — ANTACID TABLETS 500 MG: 500 TABLET, CHEWABLE ORAL at 16:03

## 2019-07-31 RX ADMIN — DEXAMETHASONE SODIUM PHOSPHATE 10 MG: 4 INJECTION, SOLUTION INTRA-ARTICULAR; INTRALESIONAL; INTRAMUSCULAR; INTRAVENOUS; SOFT TISSUE at 07:39

## 2019-07-31 RX ADMIN — POTASSIUM CHLORIDE AND SODIUM CHLORIDE: 900; 150 INJECTION, SOLUTION INTRAVENOUS at 14:06

## 2019-07-31 RX ADMIN — CEFAZOLIN SODIUM 2 G: 2 INJECTION, SOLUTION INTRAVENOUS at 21:19

## 2019-07-31 RX ADMIN — HYDROCODONE BITARTRATE AND ACETAMINOPHEN 1 TABLET: 5; 325 TABLET ORAL at 21:21

## 2019-07-31 RX ADMIN — ONDANSETRON 4 MG: 2 INJECTION INTRAMUSCULAR; INTRAVENOUS at 07:39

## 2019-07-31 RX ADMIN — HYDROMORPHONE HYDROCHLORIDE 0.4 MG: 1 INJECTION, SOLUTION INTRAMUSCULAR; INTRAVENOUS; SUBCUTANEOUS at 10:09

## 2019-07-31 RX ADMIN — HYDROMORPHONE HYDROCHLORIDE 0.4 MG: 1 INJECTION, SOLUTION INTRAMUSCULAR; INTRAVENOUS; SUBCUTANEOUS at 10:00

## 2019-07-31 RX ADMIN — DIVALPROEX SODIUM 750 MG: 500 TABLET, DELAYED RELEASE ORAL at 21:20

## 2019-07-31 RX ADMIN — OXYCODONE HYDROCHLORIDE AND ACETAMINOPHEN 2 TABLET: 5; 325 TABLET ORAL at 09:43

## 2019-07-31 RX ADMIN — ROCURONIUM BROMIDE 50 MG: 10 INJECTION, SOLUTION INTRAVENOUS at 07:39

## 2019-07-31 RX ADMIN — DOCUSATE SODIUM 100 MG: 100 CAPSULE, LIQUID FILLED ORAL at 16:03

## 2019-07-31 RX ADMIN — HYDROMORPHONE HYDROCHLORIDE 0.2 MG: 1 INJECTION, SOLUTION INTRAMUSCULAR; INTRAVENOUS; SUBCUTANEOUS at 09:55

## 2019-07-31 RX ADMIN — SUCCINYLCHOLINE CHLORIDE 100 MG: 20 INJECTION, SOLUTION INTRAMUSCULAR; INTRAVENOUS at 07:39

## 2019-07-31 RX ADMIN — LIDOCAINE HYDROCHLORIDE 50 MG: 20 INJECTION, SOLUTION INTRAVENOUS at 07:39

## 2019-07-31 RX ADMIN — HYDROMORPHONE HYDROCHLORIDE 0.4 MG: 1 INJECTION, SOLUTION INTRAMUSCULAR; INTRAVENOUS; SUBCUTANEOUS at 10:27

## 2019-07-31 RX ADMIN — MIDAZOLAM HYDROCHLORIDE 2 MG: 1 INJECTION, SOLUTION INTRAMUSCULAR; INTRAVENOUS at 07:39

## 2019-07-31 RX ADMIN — CEFAZOLIN SODIUM 2 G: 2 INJECTION, SOLUTION INTRAVENOUS at 14:07

## 2019-07-31 RX ADMIN — HYDROCODONE BITARTRATE AND ACETAMINOPHEN 1 TABLET: 5; 325 TABLET ORAL at 16:05

## 2019-07-31 RX ADMIN — LORAZEPAM 1 MG: 1 TABLET ORAL at 21:21

## 2019-07-31 RX ADMIN — CEFAZOLIN 2 G: 330 INJECTION, POWDER, FOR SOLUTION INTRAMUSCULAR; INTRAVENOUS at 07:39

## 2019-07-31 RX ADMIN — FLUTICASONE PROPIONATE 100 MCG: 50 SPRAY, METERED NASAL at 14:11

## 2019-07-31 RX ADMIN — SUGAMMADEX 200 MG: 100 INJECTION, SOLUTION INTRAVENOUS at 09:08

## 2019-07-31 ASSESSMENT — LIFESTYLE VARIABLES
TOTAL SCORE: 0
HAVE YOU EVER FELT YOU SHOULD CUT DOWN ON YOUR DRINKING: NO
ALCOHOL_USE: NO
HAVE PEOPLE ANNOYED YOU BY CRITICIZING YOUR DRINKING: NO
EVER HAD A DRINK FIRST THING IN THE MORNING TO STEADY YOUR NERVES TO GET RID OF A HANGOVER: NO
EVER_SMOKED: NEVER
TOTAL SCORE: 0
EVER FELT BAD OR GUILTY ABOUT YOUR DRINKING: NO
EVER HAD A DRINK FIRST THING IN THE MORNING TO STEADY YOUR NERVES TO GET RID OF A HANGOVER: NO
ALCOHOL_USE: NO
EVER FELT BAD OR GUILTY ABOUT YOUR DRINKING: NO
TOTAL SCORE: 0
HAVE YOU EVER FELT YOU SHOULD CUT DOWN ON YOUR DRINKING: NO
ON A TYPICAL DAY WHEN YOU DRINK ALCOHOL HOW MANY DRINKS DO YOU HAVE: 0
TOTAL SCORE: 0
CONSUMPTION TOTAL: INCOMPLETE
HOW MANY TIMES IN THE PAST YEAR HAVE YOU HAD 5 OR MORE DRINKS IN A DAY: 0
DOES PATIENT WANT TO STOP DRINKING: NO
AVERAGE NUMBER OF DAYS PER WEEK YOU HAVE A DRINK CONTAINING ALCOHOL: 0
CONSUMPTION TOTAL: NEGATIVE
TOTAL SCORE: 0
HAVE PEOPLE ANNOYED YOU BY CRITICIZING YOUR DRINKING: NO
TOTAL SCORE: 0

## 2019-07-31 ASSESSMENT — COGNITIVE AND FUNCTIONAL STATUS - GENERAL
CLIMB 3 TO 5 STEPS WITH RAILING: A LITTLE
DAILY ACTIVITIY SCORE: 24
MOBILITY SCORE: 22
WALKING IN HOSPITAL ROOM: A LITTLE
SUGGESTED CMS G CODE MODIFIER DAILY ACTIVITY: CH
SUGGESTED CMS G CODE MODIFIER MOBILITY: CJ

## 2019-07-31 ASSESSMENT — PAIN SCALES - GENERAL: PAIN_LEVEL: 3

## 2019-07-31 ASSESSMENT — PATIENT HEALTH QUESTIONNAIRE - PHQ9
SUM OF ALL RESPONSES TO PHQ9 QUESTIONS 1 AND 2: 0
1. LITTLE INTEREST OR PLEASURE IN DOING THINGS: NOT AT ALL
2. FEELING DOWN, DEPRESSED, IRRITABLE, OR HOPELESS: NOT AT ALL

## 2019-07-31 NOTE — OR SURGEON
Immediate Post OP Note    PreOp Diagnosis: cervical stenosis, cervical radiculopathy    PostOp Diagnosis: same    Procedure(s):  DISCECTOMY, SPINE, CERVICAL, ANTERIOR APPROACH, WITH FUSION- FOR C6-7 PLACEMENT OF ARTIFICIAL DISC - Wound Class: Clean    Surgeon(s):  Nikita Jackson M.D.    Anesthesiologist/Type of Anesthesia:  Anesthesiologist: Phani Leger M.D./General    Surgical Staff:  Assistant: CHESTER Duarte  Monitoring Nurse: Shaquille Hines R.N.  Scrub Person: Jaye Ford  Radiology Technologist: Valentín Romo    Specimens removed if any:  * No specimens in log *    Estimated Blood Loss: minimal    Findings: good decompression    Complications: none        7/31/2019 9:14 AM CHESTER Duarte

## 2019-07-31 NOTE — ANESTHESIA TIME REPORT
Anesthesia Start and Stop Event Times     Date Time Event    7/31/2019 0716 Ready for Procedure     0739 Anesthesia Start     0925 Anesthesia Stop        Responsible Staff  07/31/19    Name Role Begin End    Phani Leger M.D. Anesth 0739 0925        Preop Diagnosis (Free Text):  Pre-op Diagnosis     CERVICAL RADICULOPATHY         Preop Diagnosis (Codes):    Post op Diagnosis  Cervical radiculopathy      Premium Reason  Non-Premium    Comments:

## 2019-07-31 NOTE — PROGRESS NOTES
Med rec updated and complete  Allergies reviewed  Interviewed pt with wife at bedside with permission from pt.  Pt reports no antibiotics in the last 2 weeks

## 2019-07-31 NOTE — ANESTHESIA POSTPROCEDURE EVALUATION
Patient: Jared Rg    Procedure Summary     Date:  07/31/19 Room / Location:  [facility] OR 04 / SURGERY [facility]    Anesthesia Start:  0739 Anesthesia Stop:      Procedure:  DISCECTOMY, SPINE, CERVICAL, ANTERIOR APPROACH, WITH FUSION- FOR C6-7 PLACEMENT OF ARTIFICIAL DISC (Neck) Diagnosis:  (CERVICAL RADICULOPATHY )    Surgeon:  Nikita Jackson M.D. Responsible Provider:  Phani Leger M.D.    Anesthesia Type:  general ASA Status:  2          Final Anesthesia Type: general  Last vitals  BP   Blood Pressure: 138/89    Temp   36.8 °C (98.3 °F)    Pulse   Pulse: 95   Resp   18    SpO2          Anesthesia Post Evaluation    Patient location during evaluation: PACU  Patient participation: complete - patient participated  Level of consciousness: awake and alert  Pain score: 3    Airway patency: patent  Anesthetic complications: no  Cardiovascular status: hemodynamically stable  Respiratory status: acceptable  Hydration status: euvolemic    PONV: none           Nurse Pain Score: 3 (NPRS)

## 2019-07-31 NOTE — ANESTHESIA PROCEDURE NOTES
Airway  Date/Time: 7/31/2019 7:39 AM  Performed by: Phani Leger M.D.  Authorized by: Phani Leger M.D.     Location:  OR  Urgency:  Elective  Indications for Airway Management:  Anesthesia  Spontaneous Ventilation: absent    Sedation Level:  Deep  Preoxygenated: Yes    Patient Position:  Sniffing  Final Airway Type:  Endotracheal airway  Final Endotracheal Airway:  ETT  Cuffed: Yes    Technique Used for Successful ETT Placement:  Direct laryngoscopy  Insertion Site:  Oral  Blade Type:  [place]  Laryngoscope Blade/Videolaryngoscope Blade Size:  4  ETT Size (mm):  7.0  Measured from:  Teeth  ETT to Teeth (cm):  24  Placement Verified by: auscultation and capnometry    Cormack-Lehane Classification:  Grade I - full view of glottis  Number of Attempts at Approach:  1

## 2019-08-01 VITALS
SYSTOLIC BLOOD PRESSURE: 139 MMHG | RESPIRATION RATE: 17 BRPM | HEART RATE: 94 BPM | TEMPERATURE: 97.2 F | OXYGEN SATURATION: 98 % | WEIGHT: 236.99 LBS | BODY MASS INDEX: 29.47 KG/M2 | DIASTOLIC BLOOD PRESSURE: 86 MMHG | HEIGHT: 75 IN

## 2019-08-01 PROCEDURE — G0378 HOSPITAL OBSERVATION PER HR: HCPCS

## 2019-08-01 PROCEDURE — A9270 NON-COVERED ITEM OR SERVICE: HCPCS | Performed by: NEUROLOGICAL SURGERY

## 2019-08-01 PROCEDURE — 97161 PT EVAL LOW COMPLEX 20 MIN: CPT

## 2019-08-01 PROCEDURE — 97535 SELF CARE MNGMENT TRAINING: CPT

## 2019-08-01 PROCEDURE — 700102 HCHG RX REV CODE 250 W/ 637 OVERRIDE(OP): Performed by: NEUROLOGICAL SURGERY

## 2019-08-01 PROCEDURE — 700112 HCHG RX REV CODE 229: Performed by: NEUROLOGICAL SURGERY

## 2019-08-01 RX ORDER — PSEUDOEPHEDRINE HCL 30 MG
100 TABLET ORAL 2 TIMES DAILY
Qty: 60 CAP | COMMUNITY
Start: 2019-08-01

## 2019-08-01 RX ADMIN — DIVALPROEX SODIUM 750 MG: 500 TABLET, DELAYED RELEASE ORAL at 06:01

## 2019-08-01 RX ADMIN — DOCUSATE SODIUM 100 MG: 100 CAPSULE, LIQUID FILLED ORAL at 06:01

## 2019-08-01 RX ADMIN — ATOMOXETINE 60 MG: 60 CAPSULE ORAL at 06:00

## 2019-08-01 RX ADMIN — OMEPRAZOLE 20 MG: 20 CAPSULE, DELAYED RELEASE ORAL at 06:00

## 2019-08-01 RX ADMIN — ANTACID TABLETS 500 MG: 500 TABLET, CHEWABLE ORAL at 06:01

## 2019-08-01 RX ADMIN — FLUOXETINE HYDROCHLORIDE 80 MG: 20 CAPSULE ORAL at 06:00

## 2019-08-01 ASSESSMENT — COGNITIVE AND FUNCTIONAL STATUS - GENERAL
MOBILITY SCORE: 24
SUGGESTED CMS G CODE MODIFIER MOBILITY: CH

## 2019-08-01 ASSESSMENT — GAIT ASSESSMENTS
GAIT LEVEL OF ASSIST: SUPERVISED
DISTANCE (FEET): 800
DEVIATION: OTHER (COMMENT);INCREASED BASE OF SUPPORT

## 2019-08-01 NOTE — PROGRESS NOTES
Pt reports redness an itching around incision are worse than yesterday. Started when he used pre-op . Notified Rosabayron BHATTI. Sated pt can use Benadryl PRN itching at home and can shower tomorrow, and should improve once pt can shower tomorrow- pt notified. Went over discharge instructions w/ pt, when to call the doctor, f/u appointments, medications, spinal precautions. Copy of discharge paperwork given to pt. Pt had no further questions, pt discharged to home w/ family, walked out, refused WC.

## 2019-08-01 NOTE — PROGRESS NOTES
Neurosurgery Progress Note    Subjective:  Doing great, minimal pain, right arm is much better, amb, voiding, eating, swallowing ok, has sore throat    Exam:  BUE str 5/5  Inc c/d flat, hvac is out    BP  Min: 122/71  Max: 145/78  Pulse  Av.7  Min: 62  Max: 112  Resp  Av.7  Min: 14  Max: 24  Temp  Av.5 °C (97.7 °F)  Min: 36.2 °C (97.2 °F)  Max: 37.1 °C (98.7 °F)  SpO2  Av.3 %  Min: 94 %  Max: 100 %    No data recorded                      Intake/Output       19 07 - 19 0659 19 - 19 0659       Total  Total       Intake    P.O.  860  -- 860  --  -- --    P.O. 860 -- 860 -- -- --    I.V.  2190  -- 2190  --  -- --    Volume (mL) (0.9 % NaCl with KCl 20 mEq infusion) 190 -- 190 -- -- --    Volume (mL) (lactated ringers infusion) 2000 -- 2000 -- -- --    IV Piggyback  100  -- 100  --  -- --    Volume (mL) (ceFAZolin in dextrose (ANCEF) IVPB premix 2 g) 100 -- 100 -- -- --    Total Intake 3150 -- 3150 -- -- --       Output    Urine  --  600 600  --  -- --    Number of Times Voided 2 x -- 2 x -- -- --    Urine Void (mL) -- 600 600 -- -- --    Drains  --  10 10  --  -- --    Output (mL) ([REMOVED] Closed/Suction Drain 1 Neck Hemovac 10 Fr.) -- 10 10 -- -- --    Stool  --  -- --  --  -- --    Number of Times Stooled -- 0 x 0 x -- -- --    Blood  50  -- 50  --  -- --    Est. Blood Loss 50 -- 50 -- -- --    Total Output 50 610 660 -- -- --       Net I/O     3100 -658 3390 -- -- --            Intake/Output Summary (Last 24 hours) at 2019 0751  Last data filed at 2019 0600  Gross per 24 hour   Intake 3150 ml   Output 660 ml   Net 2490 ml            • divalproex  750 mg BID   • FLUoxetine  80 mg DAILY   • fluticasone  2 Spray DAILY   • LORazepam  1 mg Q8HRS PRN   • Brexpiprazole  1 mg Q EVENING   • Testosterone  20.25 mg DAILY   • MD ALERT...DO NOT ADMINISTER NSAIDS or ASPIRIN unless ORDERED By Neurosurgery  1 Each PRN   • docusate  sodium  100 mg BID   • senna-docusate  1 Tab Nightly   • senna-docusate  1 Tab Q24HRS PRN   • polyethylene glycol/lytes  1 Packet BID PRN   • magnesium hydroxide  30 mL QDAY PRN   • bisacodyl  10 mg Q24HRS PRN   • fleet  1 Each Once PRN   • 0.9 % NaCl with KCl 20 mEq 1,000 mL   Continuous   • oxyCODONE immediate release  10 mg Q3HRS PRN   • morphine injection  4 mg Q3HRS PRN   • diphenhydrAMINE  25 mg Q6HRS PRN    Or   • diphenhydrAMINE  25 mg Q6HRS PRN   • ondansetron  4 mg Q4HRS PRN   • ondansetron  4 mg Q4HRS PRN   • promethazine  12.5-25 mg Q4HRS PRN   • promethazine  12.5-25 mg Q4HRS PRN   • prochlorperazine  5-10 mg Q4HRS PRN   • cyclobenzaprine  10 mg Q8HRS PRN   • benzocaine-menthol  1 Lozenge Q2HRS PRN   • calcium carbonate  500 mg BID   • HYDROcodone-acetaminophen  1 Tab Q4HRS PRN   • omeprazole  20 mg DAILY   • atomoxetine  60 mg QHS       Assessment and Plan:  POD #1 C6-7 art disc  Prophylactic anticoagulation: no         Start date/time: n/a    Doing well  NM improved  Pain controlled  Discharge  Rx for narcotics sent through our office computer to his pharmacy

## 2019-08-01 NOTE — PROGRESS NOTES
Pt aaox4. NGUYỄN 5/5. Denies N/T. Up w/ steady gait. Pt declines pain meds this AM. +BS, good appetite. Voiding w/o difficulty. Incision CDI, red around incision. Reviewed poc with pt-verbalized understanding. Pt to d/c home after PT/OT eval. Call light in reach.

## 2019-08-01 NOTE — THERAPY
"Physical Therapy Evaluation completed.   Bed Mobility:  Supine to Sit: Supervised  Transfers: Sit to Stand: Supervised  Gait: Level Of Assist: Supervised with No Equipment Needed       Plan of Care: Patient with no further skilled PT needs in the acute care setting at this time  Discharge Recommendations: Equipment: No Equipment Needed. See below    Pt presents to PT s/p cervical artifical disc placement. Pt able to perform bed mobility with VC for spinal precautions with Spv. Pt able to ambulate 800' with no AD and ascend/descend 1 FOS with Spv. Pt reports he has no concerns in his ability to complete all ADL/IADLs once return home. Pt is functionally capable of DC home at this time. Pt would benefit from outpatient PT for progression of exercise routine to address ROM, strength, and functional mobility as needed.     See \"Rehab Therapy-Acute\" Patient Summary Report for complete documentation.     "

## 2019-08-01 NOTE — CARE PLAN
Problem: Safety  Goal: Will remain free from injury  Outcome: PROGRESSING AS EXPECTED  Note:   Call light w/in reach. Instructed pt to call for assistance as needed- pt verbalized understanding.      Problem: Discharge Barriers/Planning  Goal: Patient's continuum of care needs will be met  Outcome: PROGRESSING AS EXPECTED  Note:   Went over d/c instructions prior to d/c- all questions answered

## 2019-08-01 NOTE — DISCHARGE INSTRUCTIONS
Ok to shower, pat incision dry, leave open to air- no dressing   No Aspirin or non-steroidal anti-inflammatory medications (aleve, motrin, ibuprofen, celebrex)  No driving for 2 weeks/ No driving while on narcotic medication  Over the counter stool softeners daily while on narcotics  No lifting greater than 10 pounds, no repetitive motion above shoulder level  Follow up at [facility] 2 weeks after surgery    Discharge Instructions    Discharged to home by car with relative. Discharged via wheelchair, hospital escort: Yes.  Special equipment needed: Not Applicable    Be sure to schedule a follow-up appointment with your primary care doctor or any specialists as instructed.     Discharge Plan:   Influenza Vaccine Indication: Patient Refuses    I understand that a diet low in cholesterol, fat, and sodium is recommended for good health. Unless I have been given specific instructions below for another diet, I accept this instruction as my diet prescription.   Other diet: Regular    Special Instructions: None    · Is patient discharged on Warfarin / Coumadin?   No     Depression / Suicide Risk    As you are discharged from this [facility] facility, it is important to learn how to keep safe from harming yourself.    Recognize the warning signs:  · Abrupt changes in personality, positive or negative- including increase in energy   · Giving away possessions  · Change in eating patterns- significant weight changes-  positive or negative  · Change in sleeping patterns- unable to sleep or sleeping all the time   · Unwillingness or inability to communicate  · Depression  · Unusual sadness, discouragement and loneliness  · Talk of wanting to die  · Neglect of personal appearance   · Rebelliousness- reckless behavior  · Withdrawal from people/activities they love  · Confusion- inability to concentrate     If you or a loved one observes any of these behaviors or has concerns about self-harm, here's what you can  do:  · Talk about it- your feelings and reasons for harming yourself  · Remove any means that you might use to hurt yourself (examples: pills, rope, extension cords, firearm)  · Get professional help from the community (Mental Health, Substance Abuse, psychological counseling)  · Do not be alone:Call your Safe Contact- someone whom you trust who will be there for you.  · Call your local CRISIS HOTLINE 133-3156 or 062-704-0959  · Call your local Children's Mobile Crisis Response Team [place] (324) 585-7705 or www.[place]  · Call the toll free National Suicide Prevention Hotlines   · National Suicide Prevention Lifeline 616-007-OGHB (0107)  · National Hope Line Network 800-SUICIDE (235-2296)

## 2019-08-01 NOTE — OP REPORT
DATE OF SERVICE:  07/31/2019    PREOPERATIVE DIAGNOSIS:  Recalcitrant cervical radiculopathy.    POSTOPERATIVE DIAGNOSIS:  Recalcitrant cervical radiculopathy.    PROCEDURES PERFORMED:  1.  Anterior cervical 6-7 diskectomy with interbody arthrodesis.  2.  Bilateral neural foraminotomies, cervical 7 roots.  3.  Implantation of Medtronic Prestige LP disk arthroplasty.  4.  Use of operative microscope for microdissection.    SURGEON:  Nikita Jackson MD    ASSISTANT:  MAGY Dale    ANESTHESIA:  General.    COMPLICATIONS:  None.    ESTIMATED BLOOD LOSS:  Minimal.    DESCRIPTION OF PROCEDURE:  The patient was brought to the operating room,   identified in the usual fashion.  General endotracheal anesthesia was induced   by the anesthesia team.  The patient was then placed supine on the operating   room table.  All pressure points were meticulously padded.  Transverse   incision was marked in the anterior neck using fluoroscopic guidance.  He was   then prepped and draped in the usual sterile fashion.  Local anesthesia was   infiltrated in subcutaneous tissue.  A 15-blade was used to incise the skin.    Dissection was carried down through platysma using Bovie electrocautery.    Retractors were put in place.  We then used sharp dissection to undermine   platysma and to identify the medial border of the sternocleidomastoid muscle.    We then used blunt dissection to access the anterior spine.  Spinal needle   was placed at what we believed to be cervical 6-7.  Film was taken confirming   that we were at the correct level.  This was then marked with a Bovie.  We   then reflected longus colli muscles laterally bilaterally and then cleaned off   the anterior spine.  We placed  retractors underneath the longus   colli, then we placed retractors superiorly and inferiorly, and then we   brought in the operative microscope.  A Leksell rongeur was used to remove the   anterior osteophytes.  A 15 blade  was used to incise the disk space.  We then   removed disk contents using an alley and a pituitary.  We then placed Bath   pins for gentle disk space distraction.  Anterior osteophytes were drilled   flush the vertebral body using a high-speed air drill as were posterior   osteophytes.  We then removed posterior longitudinal ligament and posterior   osteophytes using a Kerrison 1 and 2 punch.  On the right side, we were able   to fish out multiple large free fragments of disk in the lateral recess and   neural foramen using micro nerve hook.  We checked with the Microsect curette   to confirm we had excellent decompression of the central canal, bilateral   lateral recesses, and bilateral neural foramina.  Copious amounts of   antibiotic irrigation were used to wash out the wound.  FloSeal with gentle   tamponade was used for hemostasis.  We then used a trial.  Again, film was   taken confirming that it was in excellent position with the right side.  We   then drilled the troughs as necessary and then removed the bone from the   troughs using a micro nerve hook.  Copious amounts of antibiotic irrigation   were used to wash out the wound.  We then inserted the disk arthroplasty to   the appropriate level.  Film was taken confirming it looked to be in excellent   position in both AP and lateral.  We then removed all retractors.  Hemostasis   on longus colli was achieved with bipolar.  Copious amounts of antibiotic   irrigation were used to wash out the wound and to confirm hemostasis.  We left   a Hemovac drain and closed the incision in layers and topped with Dermabond.    All sponge and needle counts were correct x2 at the end the case.  I was   present and scrubbed for the entire procedure.  The patient was awakened and   was transferred to the recovery room in stable condition where he was found to   have 5/5 strength in all muscle groups in the upper and lower extremities.        ____________________________________     MD TAMY CADET / GENE    DD:  08/01/2019 09:00:04  DT:  08/01/2019 10:27:16    D#:  [id number]  Job#:  [id number]

## 2019-08-01 NOTE — CARE PLAN
Problem: Infection  Goal: Will remain free from infection  Outcome: PROGRESSING AS EXPECTED  Note:   Patient educated regarding infection control including hand hygiene, personal cares. Patient educated regarding s/s of infection including pain, fever, heat and redness at incision site. Patient encouraged to discuss any concerns with RN or care team. Patient verbalized understanding.        Problem: Pain Management  Goal: Pain level will decrease to patient's comfort goal  Outcome: PROGRESSING AS EXPECTED  Note:   Patient educated regarding pharmacological and non-pharmacological pain management. Review of PRN pain management, medication schedule, pain scale, and reassessment.

## 2019-08-01 NOTE — PROGRESS NOTES
Mobility Note     Surgery patient?: Yes  Date of surgery: 07/31/2019  Ambulated 50 ft on day of surgery? (N/A if today is not date of surgery): Yes  Number of times ambulated 50 feet or greater today: 3  Patient has been up to chair, edge of bed or HOB 90 degrees for all meals?: Yes  Goal met? (goal is ambulating at least 50 feet 2 times on day shift, one time on night shift): Yes  If patient did not meet mobility goal, why?: N/A

## 2019-08-02 NOTE — THERAPY
Pt seen for OT eval this AM. On entry, pt was fully dressed, reportedly did it himself. Pt noted to be turning head frequently during session, education on neutral spine provided. Also educated pt on modified techniques for ADLs. Pt and spouse receptive. No further OT needs. Pt did c/o rash on neck, RN informed.

## 2019-08-02 NOTE — DISCHARGE SUMMARY
DATE OF ADMISSION:  07/31/2019    DATE OF DISCHARGE:  08/01/2019    ADMITTING DIAGNOSES:  Cervical radiculopathy and stenosis.    HISTORY OF PRESENT ILLNESS:  Please refer to the previously dictated history   and physical for complete details.  The patient is a 48-year-old patient of   Dr. Jackson who had the above findings.  Dr. Jackson discussed surgery and the   risks and benefits and the patient wished to proceed.    COURSE OF HOSPITALIZATION:  The patient was admitted to [facility] on 07/31/2019   and on that date, he was brought to the operating room where he underwent   anterior cervical C6-C7 diskectomy and placement of artificial disk with Dr. Jackson.    Postoperatively, he has done great.  He has minimal pain.  He has had   resolution of his arm symptoms.  His strength is 5/5.  He is ambulatory,   voiding, eating.  He has a sore throat, but is swallowing okay.  His incision   is clean, dry, and flat with Dermabond in place.  His Hemovac has been   removed.    DISCHARGE INSTRUCTIONS:  Provided on discharge.    DISCHARGE MEDICATIONS:  (Sent through our office computer directly to his   pharmacy).  1.  Norco 5/325 one every 6 hours p.r.n. pain, #28.  2.  Cyclobenzaprine 10 mg p.o. every 8 hours p.r.n. spasm, #30.    IMPRESSION AND PLAN:  1.  Admitting diagnoses:  Cervical radiculopathy and stenosis.  2.  Operation performed:  Anterior cervical C6-C7 diskectomy and placement of   artificial disk with Dr. Jackson on 07/31/2019.  3.  The patient is discharged in stable medical condition with the   instructions and medications as outlined above.       ____________________________________     MAGY MARRERO / GENE    DD:  08/01/2019 08:03:23  DT:  08/01/2019 17:56:55    D#:  [id number]  Job#:  [id number]

## 2019-08-06 ENCOUNTER — HOSPITAL ENCOUNTER (EMERGENCY)
Facility: MEDICAL CENTER | Age: 49
End: 2019-08-07
Attending: EMERGENCY MEDICINE
Payer: MEDICARE

## 2019-08-06 DIAGNOSIS — F31.9 BIPOLAR AFFECTIVE DISORDER, REMISSION STATUS UNSPECIFIED (HCC): ICD-10-CM

## 2019-08-06 DIAGNOSIS — Z98.890 RECENT MAJOR SURGERY: ICD-10-CM

## 2019-08-06 DIAGNOSIS — M54.2 ACUTE NECK PAIN: ICD-10-CM

## 2019-08-06 PROCEDURE — 99285 EMERGENCY DEPT VISIT HI MDM: CPT

## 2019-08-06 RX ORDER — ARIPIPRAZOLE 10 MG/1
5 TABLET ORAL DAILY
COMMUNITY

## 2019-08-07 ENCOUNTER — APPOINTMENT (OUTPATIENT)
Dept: RADIOLOGY | Facility: MEDICAL CENTER | Age: 49
End: 2019-08-07
Attending: EMERGENCY MEDICINE
Payer: MEDICARE

## 2019-08-07 VITALS
DIASTOLIC BLOOD PRESSURE: 89 MMHG | HEART RATE: 97 BPM | WEIGHT: 239.42 LBS | HEIGHT: 75 IN | SYSTOLIC BLOOD PRESSURE: 126 MMHG | OXYGEN SATURATION: 96 % | RESPIRATION RATE: 16 BRPM | BODY MASS INDEX: 29.77 KG/M2 | TEMPERATURE: 98.2 F

## 2019-08-07 LAB
ANION GAP SERPL CALC-SCNC: 10 MMOL/L (ref 0–11.9)
BASOPHILS # BLD AUTO: 0.6 % (ref 0–1.8)
BASOPHILS # BLD: 0.07 K/UL (ref 0–0.12)
BUN SERPL-MCNC: 13 MG/DL (ref 8–22)
CALCIUM SERPL-MCNC: 9 MG/DL (ref 8.5–10.5)
CHLORIDE SERPL-SCNC: 101 MMOL/L (ref 96–112)
CO2 SERPL-SCNC: 24 MMOL/L (ref 20–33)
CREAT SERPL-MCNC: 0.79 MG/DL (ref 0.5–1.4)
EOSINOPHIL # BLD AUTO: 0.26 K/UL (ref 0–0.51)
EOSINOPHIL NFR BLD: 2.3 % (ref 0–6.9)
ERYTHROCYTE [DISTWIDTH] IN BLOOD BY AUTOMATED COUNT: 41.6 FL (ref 35.9–50)
GLUCOSE SERPL-MCNC: 92 MG/DL (ref 65–99)
HCT VFR BLD AUTO: 46.9 % (ref 42–52)
HGB BLD-MCNC: 15.9 G/DL (ref 14–18)
IMM GRANULOCYTES # BLD AUTO: 0.13 K/UL (ref 0–0.11)
IMM GRANULOCYTES NFR BLD AUTO: 1.2 % (ref 0–0.9)
INR PPP: 0.93 (ref 0.87–1.13)
LYMPHOCYTES # BLD AUTO: 3.46 K/UL (ref 1–4.8)
LYMPHOCYTES NFR BLD: 30.7 % (ref 22–41)
MCH RBC QN AUTO: 30.1 PG (ref 27–33)
MCHC RBC AUTO-ENTMCNC: 33.9 G/DL (ref 33.7–35.3)
MCV RBC AUTO: 88.7 FL (ref 81.4–97.8)
MONOCYTES # BLD AUTO: 1.42 K/UL (ref 0–0.85)
MONOCYTES NFR BLD AUTO: 12.6 % (ref 0–13.4)
NEUTROPHILS # BLD AUTO: 5.94 K/UL (ref 1.82–7.42)
NEUTROPHILS NFR BLD: 52.6 % (ref 44–72)
NRBC # BLD AUTO: 0 K/UL
NRBC BLD-RTO: 0 /100 WBC
PLATELET # BLD AUTO: 247 K/UL (ref 164–446)
PMV BLD AUTO: 8.8 FL (ref 9–12.9)
POTASSIUM SERPL-SCNC: 4.2 MMOL/L (ref 3.6–5.5)
PROTHROMBIN TIME: 12.7 SEC (ref 12–14.6)
RBC # BLD AUTO: 5.29 M/UL (ref 4.7–6.1)
SODIUM SERPL-SCNC: 135 MMOL/L (ref 135–145)
WBC # BLD AUTO: 11.3 K/UL (ref 4.8–10.8)

## 2019-08-07 PROCEDURE — 700101 HCHG RX REV CODE 250: Performed by: EMERGENCY MEDICINE

## 2019-08-07 PROCEDURE — 96374 THER/PROPH/DIAG INJ IV PUSH: CPT

## 2019-08-07 PROCEDURE — 85610 PROTHROMBIN TIME: CPT

## 2019-08-07 PROCEDURE — 85025 COMPLETE CBC W/AUTO DIFF WBC: CPT

## 2019-08-07 PROCEDURE — 700111 HCHG RX REV CODE 636 W/ 250 OVERRIDE (IP): Performed by: EMERGENCY MEDICINE

## 2019-08-07 PROCEDURE — 80048 BASIC METABOLIC PNL TOTAL CA: CPT

## 2019-08-07 PROCEDURE — A9270 NON-COVERED ITEM OR SERVICE: HCPCS | Performed by: EMERGENCY MEDICINE

## 2019-08-07 PROCEDURE — 72040 X-RAY EXAM NECK SPINE 2-3 VW: CPT

## 2019-08-07 PROCEDURE — 700105 HCHG RX REV CODE 258: Performed by: EMERGENCY MEDICINE

## 2019-08-07 PROCEDURE — 700102 HCHG RX REV CODE 250 W/ 637 OVERRIDE(OP): Performed by: EMERGENCY MEDICINE

## 2019-08-07 PROCEDURE — 87040 BLOOD CULTURE FOR BACTERIA: CPT

## 2019-08-07 RX ORDER — DIAZEPAM 5 MG/ML
5 INJECTION, SOLUTION INTRAMUSCULAR; INTRAVENOUS ONCE
Status: COMPLETED | OUTPATIENT
Start: 2019-08-07 | End: 2019-08-07

## 2019-08-07 RX ORDER — DIAZEPAM 5 MG/1
5 TABLET ORAL EVERY 8 HOURS PRN
Qty: 21 TAB | Refills: 0 | Status: SHIPPED | OUTPATIENT
Start: 2019-08-07 | End: 2019-08-14

## 2019-08-07 RX ORDER — HYDROMORPHONE HYDROCHLORIDE 1 MG/ML
1 INJECTION, SOLUTION INTRAMUSCULAR; INTRAVENOUS; SUBCUTANEOUS ONCE
Status: DISCONTINUED | OUTPATIENT
Start: 2019-08-07 | End: 2019-08-07

## 2019-08-07 RX ORDER — SODIUM CHLORIDE 9 MG/ML
1000 INJECTION, SOLUTION INTRAVENOUS ONCE
Status: COMPLETED | OUTPATIENT
Start: 2019-08-07 | End: 2019-08-07

## 2019-08-07 RX ORDER — LIDOCAINE 50 MG/G
2 PATCH TOPICAL EVERY 24 HOURS
Qty: 20 PATCH | Refills: 0 | Status: SHIPPED | OUTPATIENT
Start: 2019-08-07 | End: 2019-08-17

## 2019-08-07 RX ORDER — DIAZEPAM 5 MG/1
5 TABLET ORAL ONCE
Status: COMPLETED | OUTPATIENT
Start: 2019-08-07 | End: 2019-08-07

## 2019-08-07 RX ORDER — LIDOCAINE 50 MG/G
2 PATCH TOPICAL ONCE
Status: DISCONTINUED | OUTPATIENT
Start: 2019-08-07 | End: 2019-08-07 | Stop reason: HOSPADM

## 2019-08-07 RX ADMIN — SODIUM CHLORIDE 1000 ML: 9 INJECTION, SOLUTION INTRAVENOUS at 01:15

## 2019-08-07 RX ADMIN — DIAZEPAM 5 MG: 5 TABLET ORAL at 02:54

## 2019-08-07 RX ADMIN — LIDOCAINE 2 PATCH: 50 PATCH TOPICAL at 03:05

## 2019-08-07 RX ADMIN — DIAZEPAM 5 MG: 5 INJECTION, SOLUTION INTRAMUSCULAR; INTRAVENOUS at 01:48

## 2019-08-07 NOTE — ED TRIAGE NOTES
"Jared Bolivar Ifrah  48 y.o. male  Chief Complaint   Patient presents with   • Post-Op Pain     C6-C7 disc replacement a week ago with Dr. Martinez. He reports the past 2 days his pain has worsened exponentially   • Sent by MD     Sent by Dr. Martinez     Pt ambulated to triage with steady gait for above complaint.     Pt back to [facility]. Educated to inform staff of any concerns or changes.     Blood Pressure: 135/96, Pulse: 100, Respiration: 20, Temperature: 36.8 °C (98.2 °F), Height: 190.5 cm (6' 3\"), Weight: 108.6 kg (239 lb 6.7 oz), Pulse Oximetry: 96 %    "
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
## 2019-08-07 NOTE — ED NOTES
The patient has been provided with discharge education and information.  The patient was also provided with instructions on follow up care and return precautions.  The patient verbalizes understanding of discharge instructions, follow up care, and return precautions.  All questions have been answered.  valium and lidocaine patch RX written by ROCIO ALVAREZ/Rula, good color and appropriate at time of discharge.  Patient ambulated out of department.

## 2019-08-07 NOTE — DISCHARGE INSTRUCTIONS
You were seen and evaluated in the Emergency Department at [facility] for:     Neck pain after spine surgery    You had the following tests and studies:    Thankfully your xrays and blood tests are stable    You received the following medications:    diazepam    You received the following prescriptions:    Diazepam, take this up to three times per day but DO NOT take with your lorazepam.   ----------------------------    Please make sure to follow up with:    Your neurosurgeon and primary care provider within the week, but if you have any new or worse symptoms please come right back to the ER!    Good luck, we hope you get better soon!  ----------------------------    We always encourage patients to return IMMEDIATELY if they have:  Increased or changing pain, passing out, fevers over 100.4 (taken in your mouth or rectally) for more than 2 days, redness or swelling of skin or tissues, feeling like your heart is beating fast, chest pain that is new or worsening, trouble breathing, feeling like your throat is closing up and can not breath, inability to walk, weakness of any part of your body, new dizziness, severe bleeding that won't stop from any part of your body, if you can't eat or drink, or if you have any other concerns.   If you feel worse, please know that you can always return with any questions, concerns, worse symptoms, or you are feeling unsafe. We certainly cannot say for sure that we have ruled out every illness or dangerous disease, but we feel that at this specific time, your exam, tests, and vital signs like heart rate and blood pressure are safe for discharge.

## 2019-08-07 NOTE — CONSULTS
HPI  The patient is a 48-year-old male who presents status post disc replacement approximately 1 week ago with complaint of intractable interscapular pain.  He has a history of anxiety disorder and is on lorazepam 1 mg 3 times daily.  Over the course of the evening the patient has called the neurosurgery attending on call out of complaint for back pain, he stated at the time of his phone consult that he did not have any symptoms of fever shaking chills swelling from the incision site difficulty swallowing or other concerns of infection or surgical site complication.  He stated that over the last 3 days he has had progressive interscapular pain that feels like he is being kicked in the back.  And attempts to isael the pain the he was instructed to take a double dose of his Norco and see if that cause symptomatic relief, he also had taken his Flexeril tablets that he was prescribed in his perioperative period.  Given his unrelenting pain the patient was instructed to proceed to the emergency room for x-rays and formal evaluation.    12 point review of systems is negative for new focal neurologic deficit normal sensation motor deficit bowel or bladder incontinence chest pain shortness of breath  Past medical cyst history see EMR  Past surgical history significant for anterior cervical discectomy and disc replacement    Medications patient is currently on Norco 5/350s, and Flexeril    Physical examination  HEENT patient is atraumatic normocephalic with exception of his left cervical incision which is well healing without signs of erythema swelling dehiscence drainage.  His trachea is midline without deviation concern for swelling he is able to swallow without difficulty.    Pulmonary examination normal her breathing  Cardiovascular examination 2+ pulses  Neuro examination  Cranial nerves are intact grossly his eyes are conjugate he has full range of extraocular muscles pupils are equal round reactive to light his face  is symmetric his cervical mastoid shrug are 5 out of 5 strength  Motor examination the patient is 5 out of 5 strength to his 2 sensation 2+ reflexes x4 his prior upper extremity weakness is gone he does not have a Shipman arm    Assessment and plan  This is a 48-year-old gentleman who presents status post C6-7 disc replacement by Dr. Jackson 1 week ago he has had subsequent increase in his interscapular pain over the last week with no relief from his prescribed perioperative medications.  On evaluation the patient appears to have musculoskeletal pain in his interscapular region consistent with muscular spasm.  We have instructed the patient this is common after having a disc replacement surgery there is sometimes stretching of the capsule which correlates with this.  It is apparent at this time that Flexeril was not sufficient to symptomatically treat this muscular pain in the emergency room we are going to give the patient 5 mg IV Valium for symptomatic relief if this abates his pain we are going to send him home on 5 mg oral tablets of Valium to be taken 3 times daily as needed for his muscular spasms.  He can also continue his Norco for localized pain in addition the emergency room is free to treat the pain as they see indicated.  Additionally we are going to obtain x-rays AP and lateral to assure that the disc replacement is in appropriate location and consistent with his postoperative films.  If all of these measures are reassuring we will discharge him home from the emergency room and have him follow-up at his scheduled postoperative visit next week.    Dr. Bolivar Martinez    XR c-spine reviewed : stable placement of disc replacement from operative imaging. No concerns at this time for migration.

## 2019-08-10 ENCOUNTER — TELEPHONE (OUTPATIENT)
Dept: MEDICAL GROUP | Facility: MEDICAL CENTER | Age: 49
End: 2019-08-10

## 2019-08-10 NOTE — TELEPHONE ENCOUNTER
Pt given Rx for Lidocaine Patches in most recent ED visit. Should we continue with PA or Change Rx. Please advise.

## 2019-08-12 LAB
BACTERIA BLD CULT: NORMAL
BACTERIA BLD CULT: NORMAL
SIGNIFICANT IND 70042: NORMAL
SIGNIFICANT IND 70042: NORMAL
SITE SITE: NORMAL
SITE SITE: NORMAL
SOURCE SOURCE: NORMAL
SOURCE SOURCE: NORMAL

## 2019-08-23 ENCOUNTER — HOSPITAL ENCOUNTER (OUTPATIENT)
Dept: RADIOLOGY | Facility: MEDICAL CENTER | Age: 49
End: 2019-08-23
Attending: CLINICAL NURSE SPECIALIST
Payer: MEDICARE

## 2019-08-23 DIAGNOSIS — M54.12 RADICULOPATHY OF CERVICAL SPINE: ICD-10-CM

## 2019-08-23 PROCEDURE — 72050 X-RAY EXAM NECK SPINE 4/5VWS: CPT

## 2019-08-26 ENCOUNTER — TELEPHONE (OUTPATIENT)
Dept: MEDICAL GROUP | Facility: MEDICAL CENTER | Age: 49
End: 2019-08-26

## 2019-08-26 NOTE — TELEPHONE ENCOUNTER
Left message with patient about no show to appointment today 8/26/19.  Explained this was his first no show and the no show policy.

## 2019-12-28 DIAGNOSIS — E34.9 TESTOSTERONE DEFICIENCY: ICD-10-CM

## 2019-12-30 RX ORDER — TESTOSTERONE 16.2 MG/G
GEL TRANSDERMAL
Qty: 75 G | Refills: 0 | Status: SHIPPED
Start: 2019-12-30 | End: 2020-01-29

## 2020-05-06 DIAGNOSIS — E78.5 HYPERLIPIDEMIA, MILD: ICD-10-CM

## 2020-05-07 RX ORDER — ATORVASTATIN CALCIUM 10 MG/1
10 TABLET, FILM COATED ORAL DAILY
Qty: 30 TAB | Refills: 11 | Status: SHIPPED | OUTPATIENT
Start: 2020-05-07 | End: 2021-06-07

## 2021-01-24 NOTE — TELEPHONE ENCOUNTER
Was the patient seen in the last year in this department? Yes     Does patient have an active prescription for medications requested? No     Received Request Via: Patient  
Intracranial Hemorrhage

## 2021-06-07 DIAGNOSIS — E78.5 HYPERLIPIDEMIA, MILD: ICD-10-CM

## 2021-06-07 RX ORDER — ATORVASTATIN CALCIUM 10 MG/1
TABLET, FILM COATED ORAL
Qty: 90 TABLET | Refills: 1 | Status: SHIPPED | OUTPATIENT
Start: 2021-06-07

## 2021-07-07 ENCOUNTER — TELEPHONE (OUTPATIENT)
Dept: PHYSICAL THERAPY | Facility: REHABILITATION | Age: 51
End: 2021-07-07

## 2021-07-07 NOTE — OP THERAPY DISCHARGE SUMMARY
Outpatient Physical Therapy  DISCHARGE SUMMARY NOTE      [facility] [address], [address]  [place] [address]  Phone:  [phone]    Date of Visit: 07/07/2021    Patient: Jared Rg  YOB: 1970  MRN: [id number]     Referring Provider:  CHESTER Lombardi   Referring Diagnosis  Neck discomfort          Functional Assessment Used        Your patient is being discharged from Physical Therapy with the following comments:   · Patient has failed to schedule or reschedule follow-up visits    Comments:  Patient was last seen 12/11/2017 for a follow up appt.     Limitations Remaining:  Please see daily treatment notes    Recommendations:  Discharge from PT    Kourtney Juarez PT, MSPT    Date: 7/7/2021

## 2023-05-09 NOTE — ASSESSMENT & PLAN NOTE
Pt reports skin irritation to face where CPAP mask.  States nothing helps. Has tried Kenalog.  Discussed him contacting his Supplier and discuss alternative  Mask materials.  Will try low dose Lotrisone as well.   [Alert] : alert [Well Nourished] : well nourished [Healthy Appearance] : healthy appearance [No Acute Distress] : no acute distress [Well Developed] : well developed [Normal Sclera/Conjunctiva] : normal sclera/conjunctiva [EOMI] : extra ocular movement intact [PERRL] : pupils equal, round and reactive to light [No Proptosis] : no proptosis [Normal Outer Ear/Nose] : the ears and nose were normal in appearance [Normal Hearing] : hearing was normal [Supple] : the neck was supple [Thyroid Not Enlarged] : the thyroid was not enlarged [No Respiratory Distress] : no respiratory distress [No Accessory Muscle Use] : no accessory muscle use [Normal Rate and Effort] : normal respiratory rate and effort [Clear to Auscultation] : lungs were clear to auscultation bilaterally [Normal S1, S2] : normal S1 and S2 [Normal Rate] : heart rate was normal [Regular Rhythm] : with a regular rhythm [Carotids Normal] : carotid pulses were normal with no bruits [No Edema] : no peripheral edema [Pedal Pulses Normal] : the pedal pulses are present [Normal Bowel Sounds] : normal bowel sounds [Not Tender] : non-tender [Not Distended] : not distended [Soft] : abdomen soft [Normal Anterior Cervical Nodes] : no anterior cervical lymphadenopathy [No CVA Tenderness] : no ~M costovertebral angle tenderness [No Spinal Tenderness] : no spinal tenderness [Spine Straight] : spine straight [No Stigmata of Cushings Syndrome] : no stigmata of Cushings Syndrome [Normal Gait] : normal gait [Normal Strength/Tone] : muscle strength and tone were normal [No Rash] : no rash [Acanthosis Nigricans] : no acanthosis nigricans [Foot Ulcers] : no foot ulcers [Delayed in the Right Toes] : normal in the toes [Normal] : normal [Full ROM] : with full range of motion [Delayed in the Left Toes] : normal in the toes [Vibration Dec.] : normal vibratory sensation at the level of the toes [Position Sense Dec.] : normal position sense at the level of the toes [Diminished Throughout Both Feet] : normal tactile sensation with monofilament testing throughout both feet [#1 Diminished] : number 1 was normal [#2 Diminished] : number 2 was normal [#3 Diminished] : number 3 was normal [#4 Diminished] : number 4 was normal [#5 Diminished] : number 5 was normal [#6 Diminished] : number 6 was normal [#7 Diminished] : number 7 was normal [#8 Diminished] : number 8 was normal [#9 Diminished] : number 9 was normal [#10 Diminished] : number 10 was normal [Cranial Nerves Intact] : cranial nerves 2-12 were intact [No Motor Deficits] : the motor exam was normal [Normal Reflexes] : deep tendon reflexes were 2+ and symmetric [No Tremors] : no tremors [Normal Affect] : the affect was normal [Normal Mood] : the mood was normal [de-identified] : DM2 [de-identified] : AUTISM, NON VERBAL

## (undated) DEVICE — NEEDLE SPINAL NON-SAFETY 18 GA X 3 IN (25EA/BX)

## (undated) DEVICE — KIT EVACUATER 3 SPRING PVC LF 1/8 DRAIN SIZE (10EA/CA)"

## (undated) DEVICE — SUTURE 4-0 MONOCRYL PLUS PS-2 - 27 INCH (36/BX)

## (undated) DEVICE — FORCEP BIPOLAR ISOCOOL 8.5 1.0MM TIP"

## (undated) DEVICE — SUTURE 3-0 VICRYL PLUS SH - 8X 18 INCH (12/BX)

## (undated) DEVICE — ELECTRODE DUAL RETURN W/ CORD - (50/PK)

## (undated) DEVICE — DRESSING TRANSPARENT FILM TEGADERM 4 X 4.75" (50EA/BX)"

## (undated) DEVICE — DRAPE MICROSCOPE X-LONG (10EA/CA)

## (undated) DEVICE — BLANKET WARMING LOWER BODY - (10/CA) INACTIVE USE #8585

## (undated) DEVICE — CHLORAPREP 26 ML APPLICATOR - ORANGE TINT(25/CA)

## (undated) DEVICE — TUBING CLEARLINK DUO-VENT - C-FLO (48EA/CA)

## (undated) DEVICE — KIT ANESTHESIA W/CIRCUIT & 3/LT BAG W/FILTER (20EA/CA)

## (undated) DEVICE — BOVIE BLADE COATED &INSULATED - 25/PK

## (undated) DEVICE — DRAPE LARGE 3 QUARTER - (20/CA)

## (undated) DEVICE — BLADE SURGICAL #15 - (50/BX 3BX/CA)

## (undated) DEVICE — DRAPE CLEAR W/ELASTIC BAND RAD CARM 40 X40" (20EA/CA)"

## (undated) DEVICE — SUCTION INSTRUMENT YANKAUER BULBOUS TIP W/O VENT (50EA/CA)

## (undated) DEVICE — KIT GEL-FLOW NT ABORBABLE GELATIN (6EA/BX)

## (undated) DEVICE — LACTATED RINGERS INJ 1000 ML - (14EA/CA 60CA/PF)

## (undated) DEVICE — SET EXTENSION WITH 2 PORTS (48EA/CA) ***PART #2C8610 IS A SUBSTITUTE*****

## (undated) DEVICE — GOWN SURGICAL XX-LARGE - (28EA/CA) SIRUS NON REINFORCED

## (undated) DEVICE — SCREW DISTRACTION 14MM YELLOW - STERILE (10EA/BX) (5TX4=20)

## (undated) DEVICE — GLOVE BIOGEL SZ 6.5 SURGICAL PF LTX (50PR/BX 4BX/CA)

## (undated) DEVICE — SLEEVE, VASO, THIGH, MED

## (undated) DEVICE — CORDS BIPOLAR COAGULATION - 12FT STERILE DISP. (10EA/BX)

## (undated) DEVICE — SHEET THYROID - (10EA/CA)

## (undated) DEVICE — DERMABOND ADVANCED - (12EA/BX)

## (undated) DEVICE — CANISTER SUCTION 3000ML MECHANICAL FILTER AUTO SHUTOFF MEDI-VAC NONSTERILE LF DISP  (40EA/CA)

## (undated) DEVICE — ELECTRODE 850 FOAM ADHESIVE - HYDROGEL RADIOTRNSPRNT (50/PK)

## (undated) DEVICE — GLOVE BIOGEL SZ 8.5 SURGICAL PF LTX - (50PR/BX 4BX/CA)

## (undated) DEVICE — SPONGE GAUZESTER. 2X2 4-PL - (2/PK 50PK/BX 30BX/CS)

## (undated) DEVICE — SPONGE PEANUT - (5/PK 50PK/CA)

## (undated) DEVICE — MASK ANESTHESIA ADULT  - (100/CA)

## (undated) DEVICE — KIT ROOM DECONTAMINATION

## (undated) DEVICE — SYRINGE SAFETY 10 ML 18 GA X 1 1/2 BLUNT LL (100/BX 4BX/CA)

## (undated) DEVICE — SYRINGE ASEPTO - (50EA/CA

## (undated) DEVICE — GLOVE BIOGEL INDICATOR SZ 6.5 SURGICAL PF LTX - (50PR/BX 4BX/CA)

## (undated) DEVICE — SUTURE GENERAL

## (undated) DEVICE — GLOVE BIOGEL PI INDICATOR SZ 6.5 SURGICAL PF LF - (50/BX 4BX/CA)

## (undated) DEVICE — SENSOR SPO2 NEO LNCS ADHESIVE (20/BX) SEE USER NOTES

## (undated) DEVICE — TOOL DISSECT MATCH HEAD

## (undated) DEVICE — NEPTUNE 4 PORT MANIFOLD - (20/PK)

## (undated) DEVICE — SET LEADWIRE 5 LEAD BEDSIDE DISPOSABLE ECG (1SET OF 5/EA)

## (undated) DEVICE — SODIUM CHL IRRIGATION 0.9% 1000ML (12EA/CA)

## (undated) DEVICE — GLOVE BIOGEL PI ORTHO SZ 6 SURGICAL PF LF (40PR/BX)

## (undated) DEVICE — PROTECTOR ULNA NERVE - (36PR/CA)

## (undated) DEVICE — GOWN WARMING STANDARD FLEX - (30/CA)

## (undated) DEVICE — HEAD HOLDER JUNIOR/ADULT